# Patient Record
Sex: MALE | Race: BLACK OR AFRICAN AMERICAN | NOT HISPANIC OR LATINO | URBAN - METROPOLITAN AREA
[De-identification: names, ages, dates, MRNs, and addresses within clinical notes are randomized per-mention and may not be internally consistent; named-entity substitution may affect disease eponyms.]

---

## 2018-01-06 ENCOUNTER — EMERGENCY (EMERGENCY)
Facility: HOSPITAL | Age: 63
LOS: 0 days | Discharge: HOME | End: 2018-01-06
Attending: EMERGENCY MEDICINE | Admitting: EMERGENCY MEDICINE
Payer: MEDICARE

## 2018-01-06 VITALS
HEIGHT: 70 IN | DIASTOLIC BLOOD PRESSURE: 68 MMHG | TEMPERATURE: 98 F | HEART RATE: 70 BPM | RESPIRATION RATE: 16 BRPM | WEIGHT: 175.05 LBS | SYSTOLIC BLOOD PRESSURE: 115 MMHG

## 2018-01-06 DIAGNOSIS — D64.9 ANEMIA, UNSPECIFIED: ICD-10-CM

## 2018-01-06 DIAGNOSIS — F20.9 SCHIZOPHRENIA, UNSPECIFIED: ICD-10-CM

## 2018-01-06 DIAGNOSIS — M79.1 MYALGIA: ICD-10-CM

## 2018-01-06 DIAGNOSIS — Z59.0 HOMELESSNESS: ICD-10-CM

## 2018-01-06 DIAGNOSIS — Z79.899 OTHER LONG TERM (CURRENT) DRUG THERAPY: ICD-10-CM

## 2018-01-06 LAB
ALBUMIN SERPL ELPH-MCNC: 3.8 G/DL — SIGNIFICANT CHANGE UP (ref 3.3–5)
ALP SERPL-CCNC: 103 U/L — SIGNIFICANT CHANGE UP (ref 40–120)
ALT FLD-CCNC: 18 U/L — SIGNIFICANT CHANGE UP (ref 12–78)
ANION GAP SERPL CALC-SCNC: 6 MMOL/L — SIGNIFICANT CHANGE UP (ref 5–17)
ANISOCYTOSIS BLD QL: SIGNIFICANT CHANGE UP
AST SERPL-CCNC: 24 U/L — SIGNIFICANT CHANGE UP (ref 15–37)
BASOPHILS # BLD AUTO: 0.2 K/UL — SIGNIFICANT CHANGE UP (ref 0–0.2)
BASOPHILS NFR BLD AUTO: 1.9 % — SIGNIFICANT CHANGE UP (ref 0–2)
BILIRUB SERPL-MCNC: 0.3 MG/DL — SIGNIFICANT CHANGE UP (ref 0.2–1.2)
BUN SERPL-MCNC: 22 MG/DL — SIGNIFICANT CHANGE UP (ref 7–23)
CALCIUM SERPL-MCNC: 9.3 MG/DL — SIGNIFICANT CHANGE UP (ref 8.5–10.1)
CHLORIDE SERPL-SCNC: 107 MMOL/L — SIGNIFICANT CHANGE UP (ref 96–108)
CO2 SERPL-SCNC: 26 MMOL/L — SIGNIFICANT CHANGE UP (ref 22–31)
CREAT SERPL-MCNC: 0.71 MG/DL — SIGNIFICANT CHANGE UP (ref 0.5–1.3)
DACRYOCYTES BLD QL SMEAR: SLIGHT — SIGNIFICANT CHANGE UP
ELLIPTOCYTES BLD QL SMEAR: SLIGHT — SIGNIFICANT CHANGE UP
EOSINOPHIL # BLD AUTO: 0.2 K/UL — SIGNIFICANT CHANGE UP (ref 0–0.5)
EOSINOPHIL NFR BLD AUTO: 1.9 % — SIGNIFICANT CHANGE UP (ref 0–6)
GLUCOSE SERPL-MCNC: 73 MG/DL — SIGNIFICANT CHANGE UP (ref 70–99)
HCT VFR BLD CALC: 32 % — LOW (ref 39–50)
HGB BLD-MCNC: 9.4 G/DL — LOW (ref 13–17)
HYPOCHROMIA BLD QL: SLIGHT — SIGNIFICANT CHANGE UP
LYMPHOCYTES # BLD AUTO: 1.5 K/UL — SIGNIFICANT CHANGE UP (ref 1–3.3)
LYMPHOCYTES # BLD AUTO: 18.4 % — SIGNIFICANT CHANGE UP (ref 13–44)
MANUAL DIF COMMENT BLD-IMP: SIGNIFICANT CHANGE UP
MCHC RBC-ENTMCNC: 21 PG — LOW (ref 27–34)
MCHC RBC-ENTMCNC: 29.6 GM/DL — LOW (ref 32–36)
MCV RBC AUTO: 71.2 FL — LOW (ref 80–100)
MICROCYTES BLD QL: SIGNIFICANT CHANGE UP
MONOCYTES # BLD AUTO: 0.8 K/UL — SIGNIFICANT CHANGE UP (ref 0–0.9)
MONOCYTES NFR BLD AUTO: 10 % — SIGNIFICANT CHANGE UP (ref 2–14)
NEUTROPHILS # BLD AUTO: 5.6 K/UL — SIGNIFICANT CHANGE UP (ref 1.8–7.4)
NEUTROPHILS NFR BLD AUTO: 67.8 % — SIGNIFICANT CHANGE UP (ref 43–77)
PLAT MORPH BLD: NORMAL — SIGNIFICANT CHANGE UP
PLATELET # BLD AUTO: 268 K/UL — SIGNIFICANT CHANGE UP (ref 150–400)
POIKILOCYTOSIS BLD QL AUTO: SIGNIFICANT CHANGE UP
POTASSIUM SERPL-MCNC: 3.9 MMOL/L — SIGNIFICANT CHANGE UP (ref 3.5–5.3)
POTASSIUM SERPL-SCNC: 3.9 MMOL/L — SIGNIFICANT CHANGE UP (ref 3.5–5.3)
PROT SERPL-MCNC: 8 GM/DL — SIGNIFICANT CHANGE UP (ref 6–8.3)
RBC # BLD: 4.49 M/UL — SIGNIFICANT CHANGE UP (ref 4.2–5.8)
RBC # FLD: 22.2 % — HIGH (ref 10.3–14.5)
RBC BLD AUTO: (no result)
SCHISTOCYTES BLD QL AUTO: SLIGHT — SIGNIFICANT CHANGE UP
SODIUM SERPL-SCNC: 139 MMOL/L — SIGNIFICANT CHANGE UP (ref 135–145)
WBC # BLD: 8.2 K/UL — SIGNIFICANT CHANGE UP (ref 3.8–10.5)
WBC # FLD AUTO: 8.2 K/UL — SIGNIFICANT CHANGE UP (ref 3.8–10.5)

## 2018-01-06 PROCEDURE — 99284 EMERGENCY DEPT VISIT MOD MDM: CPT

## 2018-01-06 SDOH — ECONOMIC STABILITY - HOUSING INSECURITY: HOMELESSNESS: Z59.0

## 2018-01-06 NOTE — ED PROVIDER NOTE - NEUROLOGICAL, MLM
Alert and oriented, no focal deficits, no motor or sensory deficits. CNs 2-12 intact. No inattention. No dysarthria. No aphasia. No facial asymmetry. No limb ataxia.

## 2018-01-06 NOTE — ED ADULT NURSE NOTE - OBJECTIVE STATEMENT
pt arrives to ED brought in by EMS complaining of cold and knee pain. pt was found outside of a 711. pt states he is homeless. denies medical history.

## 2018-01-06 NOTE — ED PROVIDER NOTE - OBJECTIVE STATEMENT
62 year old male with PMH of schizophrenia, homeless, ?CAD, poor historian presents BIB EMS with multiple chief complaints including, diffuse myalgia, fatigue, being homeless and requiring a place to stay. As per EMS, patient likely homeless and wanted a warm place to be for the evening as colder temperature outside. No cp, sob or palpitation. Denies abdominal pain. No back pain. No syncope or near syncope. No lightheadedness. No GI bleeding (no hematochezia or melena). No recent trauma. No visual or focal neurological problems. Ambulatory. 62 year old male with PMH of schizophrenia, homeless, ?CAD, poor historian presents BIB EMS with multiple chief complaints including, diffuse myalgia, fatigue, toothache, arthalgia (knees), and being homeless and requiring a place to stay. As per EMS, patient likely homeless and wanted a warm place to be for the evening as colder temperature outside. No cp, sob or palpitation. Denies abdominal pain. No back pain. No syncope or near syncope. No lightheadedness. No GI bleeding (no hematochezia or melena). No recent trauma. No visual or focal neurological problems. Ambulatory.

## 2018-01-06 NOTE — ED PROVIDER NOTE - MUSCULOSKELETAL, MLM
Spine appears normal, range of motion is not limited, no muscle or joint tenderness. 5/5 strength in flexion and extension of all limbs.

## 2018-01-06 NOTE — ED PROVIDER NOTE - PSH
H/O knee surgery  bilateral  H/O left knee surgery  1980  H/O neck surgery  2007  H/O right knee surgery  1990  History of open heart surgery

## 2018-01-06 NOTE — ED PROVIDER NOTE - PMH
AAA (abdominal aortic aneurysm) without rupture    Abdominal aortic aneurysm without rupture  12/2015  Paranoid schizophrenia    Schizophrenia

## 2018-01-09 ENCOUNTER — EMERGENCY (EMERGENCY)
Facility: HOSPITAL | Age: 63
LOS: 0 days | Discharge: ANOTHER HOSPITAL | End: 2018-01-10
Attending: EMERGENCY MEDICINE | Admitting: EMERGENCY MEDICINE
Payer: MEDICARE

## 2018-01-09 VITALS — HEIGHT: 67 IN | WEIGHT: 149.91 LBS

## 2018-01-09 LAB
ALBUMIN SERPL ELPH-MCNC: 3.5 G/DL — SIGNIFICANT CHANGE UP (ref 3.3–5)
ALP SERPL-CCNC: 102 U/L — SIGNIFICANT CHANGE UP (ref 40–120)
ALT FLD-CCNC: 20 U/L — SIGNIFICANT CHANGE UP (ref 12–78)
ANION GAP SERPL CALC-SCNC: 8 MMOL/L — SIGNIFICANT CHANGE UP (ref 5–17)
ANISOCYTOSIS BLD QL: SIGNIFICANT CHANGE UP
AST SERPL-CCNC: 21 U/L — SIGNIFICANT CHANGE UP (ref 15–37)
BASOPHILS # BLD AUTO: 0.1 K/UL — SIGNIFICANT CHANGE UP (ref 0–0.2)
BASOPHILS NFR BLD AUTO: 2.1 % — HIGH (ref 0–2)
BILIRUB SERPL-MCNC: 0.3 MG/DL — SIGNIFICANT CHANGE UP (ref 0.2–1.2)
BUN SERPL-MCNC: 25 MG/DL — HIGH (ref 7–23)
CALCIUM SERPL-MCNC: 8.7 MG/DL — SIGNIFICANT CHANGE UP (ref 8.5–10.1)
CHLORIDE SERPL-SCNC: 110 MMOL/L — HIGH (ref 96–108)
CO2 SERPL-SCNC: 27 MMOL/L — SIGNIFICANT CHANGE UP (ref 22–31)
CREAT SERPL-MCNC: 0.93 MG/DL — SIGNIFICANT CHANGE UP (ref 0.5–1.3)
DACRYOCYTES BLD QL SMEAR: SLIGHT — SIGNIFICANT CHANGE UP
ELLIPTOCYTES BLD QL SMEAR: SLIGHT — SIGNIFICANT CHANGE UP
EOSINOPHIL # BLD AUTO: 0.2 K/UL — SIGNIFICANT CHANGE UP (ref 0–0.5)
EOSINOPHIL NFR BLD AUTO: 3.3 % — SIGNIFICANT CHANGE UP (ref 0–6)
GLUCOSE SERPL-MCNC: 103 MG/DL — HIGH (ref 70–99)
HCT VFR BLD CALC: 28.5 % — LOW (ref 39–50)
HGB BLD-MCNC: 8.7 G/DL — LOW (ref 13–17)
HYPOCHROMIA BLD QL: SIGNIFICANT CHANGE UP
LYMPHOCYTES # BLD AUTO: 1.6 K/UL — SIGNIFICANT CHANGE UP (ref 1–3.3)
LYMPHOCYTES # BLD AUTO: 26 % — SIGNIFICANT CHANGE UP (ref 13–44)
MANUAL DIF COMMENT BLD-IMP: SIGNIFICANT CHANGE UP
MCHC RBC-ENTMCNC: 21.8 PG — LOW (ref 27–34)
MCHC RBC-ENTMCNC: 30.6 GM/DL — LOW (ref 32–36)
MCV RBC AUTO: 71.1 FL — LOW (ref 80–100)
MICROCYTES BLD QL: SIGNIFICANT CHANGE UP
MONOCYTES # BLD AUTO: 0.7 K/UL — SIGNIFICANT CHANGE UP (ref 0–0.9)
MONOCYTES NFR BLD AUTO: 11.5 % — SIGNIFICANT CHANGE UP (ref 2–14)
NEUTROPHILS # BLD AUTO: 3.5 K/UL — SIGNIFICANT CHANGE UP (ref 1.8–7.4)
NEUTROPHILS NFR BLD AUTO: 57 % — SIGNIFICANT CHANGE UP (ref 43–77)
OVALOCYTES BLD QL SMEAR: SLIGHT — SIGNIFICANT CHANGE UP
PLAT MORPH BLD: NORMAL — SIGNIFICANT CHANGE UP
PLATELET # BLD AUTO: 286 K/UL — SIGNIFICANT CHANGE UP (ref 150–400)
POIKILOCYTOSIS BLD QL AUTO: SLIGHT — SIGNIFICANT CHANGE UP
POTASSIUM SERPL-MCNC: 4.3 MMOL/L — SIGNIFICANT CHANGE UP (ref 3.5–5.3)
POTASSIUM SERPL-SCNC: 4.3 MMOL/L — SIGNIFICANT CHANGE UP (ref 3.5–5.3)
PROT SERPL-MCNC: 7.6 GM/DL — SIGNIFICANT CHANGE UP (ref 6–8.3)
RBC # BLD: 4.01 M/UL — LOW (ref 4.2–5.8)
RBC # FLD: 22.2 % — HIGH (ref 10.3–14.5)
RBC BLD AUTO: (no result)
SCHISTOCYTES BLD QL AUTO: SLIGHT — SIGNIFICANT CHANGE UP
SODIUM SERPL-SCNC: 145 MMOL/L — SIGNIFICANT CHANGE UP (ref 135–145)
WBC # BLD: 6.1 K/UL — SIGNIFICANT CHANGE UP (ref 3.8–10.5)
WBC # FLD AUTO: 6.1 K/UL — SIGNIFICANT CHANGE UP (ref 3.8–10.5)

## 2018-01-09 PROCEDURE — 71275 CT ANGIOGRAPHY CHEST: CPT | Mod: 26

## 2018-01-09 PROCEDURE — 99285 EMERGENCY DEPT VISIT HI MDM: CPT

## 2018-01-09 PROCEDURE — 74174 CTA ABD&PLVS W/CONTRAST: CPT | Mod: 26

## 2018-01-09 RX ORDER — ACETAMINOPHEN 500 MG
1000 TABLET ORAL ONCE
Qty: 0 | Refills: 0 | Status: COMPLETED | OUTPATIENT
Start: 2018-01-09 | End: 2018-01-09

## 2018-01-09 RX ADMIN — Medication 1000 MILLIGRAM(S): at 22:40

## 2018-01-09 NOTE — ED STATDOCS - MEDICAL DECISION MAKING DETAILS
Plan labs, CT A/P to assess AAA, SW consult. Pt with abdominal pain, plan labs, CT A/P to assess AAA, SW consult.  Low suspicion for AAA rupture.

## 2018-01-09 NOTE — ED STATDOCS - OBJECTIVE STATEMENT
61 yo male presents with headache and abdominal pain, intermittent for at least one year.  States he is homeless and has no place to stay tonight, wishes to speak with SW.

## 2018-01-09 NOTE — ED ADULT NURSE NOTE - OBJECTIVE STATEMENT
pt c/o abdominal pain and headache x1yr. states that he is homeless. denies chest pain. pt pacing and repeating questions. pt oriented x3 but confused at times.

## 2018-01-09 NOTE — ED STATDOCS - PROGRESS NOTE DETAILS
63 yo male with a PMH of pt with enlarging infrarenal aaa, spoke with transfer center sera Burnette pt to be transferred to [facility], ER to ER for possible vascular intervention. gave report to Dr. Rivero from er at [facility]

## 2018-01-10 ENCOUNTER — INPATIENT (INPATIENT)
Facility: HOSPITAL | Age: 63
LOS: 6 days | Discharge: HOME | End: 2018-01-17
Attending: SURGERY | Admitting: SURGERY
Payer: MEDICARE

## 2018-01-10 VITALS
TEMPERATURE: 98 F | HEART RATE: 86 BPM | SYSTOLIC BLOOD PRESSURE: 123 MMHG | OXYGEN SATURATION: 100 % | RESPIRATION RATE: 16 BRPM | DIASTOLIC BLOOD PRESSURE: 71 MMHG

## 2018-01-10 VITALS
DIASTOLIC BLOOD PRESSURE: 64 MMHG | OXYGEN SATURATION: 98 % | HEART RATE: 73 BPM | TEMPERATURE: 98 F | RESPIRATION RATE: 16 BRPM | SYSTOLIC BLOOD PRESSURE: 116 MMHG

## 2018-01-10 DIAGNOSIS — I71.4 ABDOMINAL AORTIC ANEURYSM, WITHOUT RUPTURE: ICD-10-CM

## 2018-01-10 DIAGNOSIS — F20.0 PARANOID SCHIZOPHRENIA: ICD-10-CM

## 2018-01-10 DIAGNOSIS — Z59.0 HOMELESSNESS: ICD-10-CM

## 2018-01-10 DIAGNOSIS — Z98.890 OTHER SPECIFIED POSTPROCEDURAL STATES: Chronic | ICD-10-CM

## 2018-01-10 DIAGNOSIS — R07.89 OTHER CHEST PAIN: ICD-10-CM

## 2018-01-10 LAB
ABO RH CONFIRMATION: SIGNIFICANT CHANGE UP
APTT BLD: 26.2 SEC — LOW (ref 27.5–37.4)
APTT BLD: 26.9 SEC — LOW (ref 27.5–37.4)
BLD GP AB SCN SERPL QL: NEGATIVE — SIGNIFICANT CHANGE UP
BLD GP AB SCN SERPL QL: SIGNIFICANT CHANGE UP
BUN SERPL-MCNC: 19 MG/DL — SIGNIFICANT CHANGE UP (ref 7–23)
CA-I BLD-SCNC: 1.16 MMOL/L — SIGNIFICANT CHANGE UP (ref 1.03–1.23)
CALCIUM SERPL-MCNC: 8.7 MG/DL — SIGNIFICANT CHANGE UP (ref 8.4–10.5)
CHLORIDE SERPL-SCNC: 105 MMOL/L — SIGNIFICANT CHANGE UP (ref 98–107)
CO2 SERPL-SCNC: 25 MMOL/L — SIGNIFICANT CHANGE UP (ref 22–31)
CREAT SERPL-MCNC: 0.76 MG/DL — SIGNIFICANT CHANGE UP (ref 0.5–1.3)
GLUCOSE SERPL-MCNC: 94 MG/DL — SIGNIFICANT CHANGE UP (ref 70–99)
HCT VFR BLD CALC: 29 % — LOW (ref 39–50)
HGB BLD-MCNC: 8.2 G/DL — LOW (ref 13–17)
INR BLD: 0.94 — SIGNIFICANT CHANGE UP (ref 0.88–1.17)
INR BLD: 0.96 RATIO — SIGNIFICANT CHANGE UP (ref 0.88–1.16)
MAGNESIUM SERPL-MCNC: 1.9 MG/DL — SIGNIFICANT CHANGE UP (ref 1.6–2.6)
MCHC RBC-ENTMCNC: 21 PG — LOW (ref 27–34)
MCHC RBC-ENTMCNC: 28.3 % — LOW (ref 32–36)
MCV RBC AUTO: 74.4 FL — LOW (ref 80–100)
NRBC # FLD: 0 — SIGNIFICANT CHANGE UP
PHOSPHATE SERPL-MCNC: 3.1 MG/DL — SIGNIFICANT CHANGE UP (ref 2.5–4.5)
PLATELET # BLD AUTO: 284 K/UL — SIGNIFICANT CHANGE UP (ref 150–400)
PMV BLD: 10.3 FL — SIGNIFICANT CHANGE UP (ref 7–13)
POTASSIUM SERPL-MCNC: 3.9 MMOL/L — SIGNIFICANT CHANGE UP (ref 3.5–5.3)
POTASSIUM SERPL-SCNC: 3.9 MMOL/L — SIGNIFICANT CHANGE UP (ref 3.5–5.3)
PROTHROM AB SERPL-ACNC: 10.4 SEC — SIGNIFICANT CHANGE UP (ref 9.8–12.7)
PROTHROM AB SERPL-ACNC: 10.8 SEC — SIGNIFICANT CHANGE UP (ref 9.8–13.1)
RBC # BLD: 3.9 M/UL — LOW (ref 4.2–5.8)
RBC # FLD: 24.4 % — HIGH (ref 10.3–14.5)
RH IG SCN BLD-IMP: NEGATIVE — SIGNIFICANT CHANGE UP
RH IG SCN BLD-IMP: NEGATIVE — SIGNIFICANT CHANGE UP
SODIUM SERPL-SCNC: 141 MMOL/L — SIGNIFICANT CHANGE UP (ref 135–145)
TYPE + AB SCN PNL BLD: SIGNIFICANT CHANGE UP
WBC # BLD: 5.23 K/UL — SIGNIFICANT CHANGE UP (ref 3.8–10.5)
WBC # FLD AUTO: 5.23 K/UL — SIGNIFICANT CHANGE UP (ref 3.8–10.5)

## 2018-01-10 PROCEDURE — 99222 1ST HOSP IP/OBS MODERATE 55: CPT

## 2018-01-10 PROCEDURE — 93010 ELECTROCARDIOGRAM REPORT: CPT

## 2018-01-10 PROCEDURE — 74019 RADEX ABDOMEN 2 VIEWS: CPT | Mod: 26

## 2018-01-10 PROCEDURE — 93925 LOWER EXTREMITY STUDY: CPT | Mod: 26

## 2018-01-10 RX ORDER — OLANZAPINE 15 MG/1
5 TABLET, FILM COATED ORAL EVERY 6 HOURS
Qty: 0 | Refills: 0 | Status: DISCONTINUED | OUTPATIENT
Start: 2018-01-10 | End: 2018-01-17

## 2018-01-10 RX ORDER — SODIUM CHLORIDE 9 MG/ML
1000 INJECTION, SOLUTION INTRAVENOUS
Qty: 0 | Refills: 0 | Status: DISCONTINUED | OUTPATIENT
Start: 2018-01-10 | End: 2018-01-11

## 2018-01-10 RX ORDER — MORPHINE SULFATE 50 MG/1
4 CAPSULE, EXTENDED RELEASE ORAL ONCE
Qty: 0 | Refills: 0 | Status: DISCONTINUED | OUTPATIENT
Start: 2018-01-10 | End: 2018-01-10

## 2018-01-10 RX ORDER — DOCUSATE SODIUM 100 MG
100 CAPSULE ORAL THREE TIMES A DAY
Qty: 0 | Refills: 0 | Status: DISCONTINUED | OUTPATIENT
Start: 2018-01-10 | End: 2018-01-17

## 2018-01-10 RX ORDER — ONDANSETRON 8 MG/1
4 TABLET, FILM COATED ORAL EVERY 8 HOURS
Qty: 0 | Refills: 0 | Status: DISCONTINUED | OUTPATIENT
Start: 2018-01-10 | End: 2018-01-17

## 2018-01-10 RX ORDER — HEPARIN SODIUM 5000 [USP'U]/ML
5000 INJECTION INTRAVENOUS; SUBCUTANEOUS EVERY 8 HOURS
Qty: 0 | Refills: 0 | Status: DISCONTINUED | OUTPATIENT
Start: 2018-01-10 | End: 2018-01-17

## 2018-01-10 RX ORDER — INFLUENZA VIRUS VACCINE 15; 15; 15; 15 UG/.5ML; UG/.5ML; UG/.5ML; UG/.5ML
0.5 SUSPENSION INTRAMUSCULAR ONCE
Qty: 0 | Refills: 0 | Status: DISCONTINUED | OUTPATIENT
Start: 2018-01-10 | End: 2018-01-17

## 2018-01-10 RX ORDER — METOPROLOL TARTRATE 50 MG
12.5 TABLET ORAL
Qty: 0 | Refills: 0 | Status: DISCONTINUED | OUTPATIENT
Start: 2018-01-10 | End: 2018-01-17

## 2018-01-10 RX ADMIN — HEPARIN SODIUM 5000 UNIT(S): 5000 INJECTION INTRAVENOUS; SUBCUTANEOUS at 21:14

## 2018-01-10 RX ADMIN — SODIUM CHLORIDE 125 MILLILITER(S): 9 INJECTION, SOLUTION INTRAVENOUS at 17:47

## 2018-01-10 RX ADMIN — Medication 100 MILLIGRAM(S): at 21:14

## 2018-01-10 RX ADMIN — MORPHINE SULFATE 4 MILLIGRAM(S): 50 CAPSULE, EXTENDED RELEASE ORAL at 13:34

## 2018-01-10 RX ADMIN — SODIUM CHLORIDE 125 MILLILITER(S): 9 INJECTION, SOLUTION INTRAVENOUS at 21:15

## 2018-01-10 RX ADMIN — MORPHINE SULFATE 4 MILLIGRAM(S): 50 CAPSULE, EXTENDED RELEASE ORAL at 09:51

## 2018-01-10 SDOH — ECONOMIC STABILITY - HOUSING INSECURITY: HOMELESSNESS: Z59.0

## 2018-01-10 NOTE — H&P ADULT - NSHPPHYSICALEXAM_GEN_ALL_CORE
Vital Signs Last 24 Hrs  T(C): 36.9 (10 Kang 2018 07:57), Max: 36.9 (10 Kang 2018 07:57)  T(F): 98.4 (10 Kang 2018 07:57), Max: 98.4 (10 Kang 2018 07:57)  HR: 74 (10 Kang 2018 07:57) (73 - 74)  BP: 118/67 (10 Kang 2018 07:57) (116/64 - 118/67)  BP(mean): --  RR: 16 (10 Kang 2018 07:57) (16 - 16)  SpO2: 100% (10 Kang 2018 07:57) (98% - 100%)    GEN: NAD, alert and oriented x 3  HEENT: WNL  CHEST: Well-healed midline sternotomy scar. Symmetrical chest rise, breath sounds CTAB  HEART: RRR, non-muffled heart sounds  ABD: Soft, non-distended. Patient reports pain in all 4 quadrants to palpation, but with distraction no tenderness could be elicited.  PSYCH: Oriented and alert. Flat affect and odd word choices (refers to many physical objects with 'Mr.' pronoun ('Mr. Aspirin, Mr. Budweiser, etc.). Denies any feelings of persecution at present, denies hearing/seeing anything that others cannot.  EXT/VASC:                          RUE - No edema/erythema or deformity. Warm, cap refill < 2 sec. Motor and sensory function intact.                                        radial  -  palpable [ x ]       doppler signal [  ]                                       ulnar  -  palpable [  ]       doppler signal [  ]                                       brachial  -  palpable [  ]       doppler signal [  ]                       LUE  - No edema/erythema or deformity. Warm, cap refill < 2 sec. Motor and sensory function intact.                                        radial  -  palpable [  ]       doppler signal [  ]                                       ulnar  -  palpable [  ]       doppler signal [  ]                                       brachial  -  palpable [  ]       doppler signal [  ]                       RLE - No edema/erythema or deformity. Warm, cap refill < 2 sec. Motor and sensory function intact.                                        femoral  -  palpable [ x ]       doppler signal [  ]                                       popliteal  -  palpable [  ]       doppler signal [ x ]                                       DP -  palpable [  ]       doppler signal [ x ]                                       PT -  palpable [  ]       doppler signal [ x ]                       LLE - No edema/erythema or deformity. Warm, cap refill < 2 sec. Motor and sensory function intact.                                        femoral  -  palpable [ x ]       doppler signal [  ]                                       popliteal  -  palpable [  ]       doppler signal [ x ]                                       AT -  palpable [  ]       doppler signal [ x ], No DP signal                                       PT -  palpable [  ]       doppler signal [ x ]

## 2018-01-10 NOTE — H&P ADULT - HISTORY OF PRESENT ILLNESS
62y male - known history of infra-renal AAA and diagnosis of schizophrenia - presents as transfer from [facility] for abdominal pain and enlarged AAA. Patient is reporting crampy abdominal pain, 9/10 in severity, constant in periodicity, with no exacerbating/alleviating factors. Denies nausea/vomiting, reports passage of flatus but cannot recall his last BM. He is also reporting pain in both feet with ambulation (the quality of this pain he cannot adequately characterize), which is alleviated with rest. No pain with elevation of lower extremities. Denies any weakness or paresthesias. Denies lightheadedness.   Patient does report intermittent chest pain with physical activity, which is characterized as tightness. No chest pain at present.  Last meal was 10 hours prior to evaluation.    Patient was previously admitted to [facility] in winter of 2015 with abdominal pain. He was found to have a 5.2 cm infrarenal AAA with a concurrent 2.3 cm left ANDERSON aneurysm. A TTE was performed, revealing an EF of 5--55% with no significant ventricular dysfunction or valvular abnormalities. He was also diagnosed with schizophrenia, but found (as per the discharge summary) to have capacity to make medical decisions after Psychiatric evaluation. He refused surgery at the time, and was discharged on haldol, quetiapine, simvastatin and famotidine. He did not report for any follow-up care, and has not taken any medications (aside from periodic ASA 81) in the intervening period. He has a daughter who has been involved in his healthcare in the past (2015), but he cannot recall any of her contact information. He is amenable to surgical treatment at this time.

## 2018-01-10 NOTE — CONSULT NOTE ADULT - ASSESSMENT
62M with infra-renal AAA, schizophrenia, ?CABG presents as transfer from [facility] for abdominal pain and enlarged AAA.     INCOMPLETE NOTE    Feng Duff MD 62M with infra-renal AAA, schizophrenia, ?CABG presents as transfer from [facility] for abdominal pain and enlarged AAA.     -- check TTE  -- start metoprolol 12.5 mg BID  -- will provide further risk assessment with TTE    Feng Duff MD

## 2018-01-10 NOTE — ED PROVIDER NOTE - OBJECTIVE STATEMENT
61 y/o male with hx OHS, Paranoid Schizophrenia, homelessness and AAA, is now sent to [facility] from [facility] for gradually worsening abdominal pain.  Pt also complains of bilateral foot pain.  Pt states that he has healing ulcers on his feet.  Pt states that he was told he had trench foot at one time. He also complains that his ears are clogged and wants them cleared. Pt denies fever, chills, nausea, vomiting diarrhea dysuria, or urinary frequency and urgency.   Pt states that he does get exertional dyspnea and chest tightness on exertion.

## 2018-01-10 NOTE — ED PROVIDER NOTE - ENMT NEGATIVE STATEMENT, MLM
Ears are clogged but no dysphagia, no hoarseness and no throat pain.Neck: no lumps, no pain, no stiffness and no swollen glands.

## 2018-01-10 NOTE — BEHAVIORAL HEALTH ASSESSMENT NOTE - CASE SUMMARY
61 yo Homeless male with schizophrenia   1- pt can consent to surgery  2- if agitated can offer Zyprexa 5mg PO/IM prn Q8 hours

## 2018-01-10 NOTE — BEHAVIORAL HEALTH ASSESSMENT NOTE - SUMMARY
Pt is a 61yo male, single, disabled, undomiciled, with reported history of paranoid schizophrenia (not on meds), distant past psychiatric hospitalizations, no past suicide attempts, current smoker, PMH infra-renal AAA presents as transfer from [facility] for abdominal pain and enlarged AAA.     On exam, pt AAOx4, irritable.  Pt verbalizes understanding of current condition, treatment plan, and risks/benefits.  He appreciates that he requires treatment.  Process information in a linear manner.  Pt has capacity to consent for surgery.  He verbalizes agreement at this time.  Pt denies acute psychiatric symptoms and denies need for psychiatric medications.  PRNs as below.  Monitor EKG for QTC.  Will follow.

## 2018-01-10 NOTE — ED ADULT NURSE REASSESSMENT NOTE - NS ED NURSE REASSESS COMMENT FT1
pt to be transferred to [facility] ED r/t AAA (grown since 2015 as per CT). report given to Luzma, transfer center RN at [phone].

## 2018-01-10 NOTE — BEHAVIORAL HEALTH ASSESSMENT NOTE - NSBHCONSULTFOLLOWAFTERCARE_PSY_A_CORE FT
[facility] [[email or web address]] Clinic [phone], Monday to Friday, 9am-7pm, walk-in

## 2018-01-10 NOTE — BEHAVIORAL HEALTH ASSESSMENT NOTE - HPI (INCLUDE ILLNESS QUALITY, SEVERITY, DURATION, TIMING, CONTEXT, MODIFYING FACTORS, ASSOCIATED SIGNS AND SYMPTOMS)
Pt is a 61yo male, single, disabled, undomiciled, with reported history of paranoid schizophrenia (not on meds), distant past psychiatric hospitalizations, no past suicide attempts, current smoker, [facility] infra-renal AAA presents as transfer from [facility] for abdominal pain and enlarged AAA.     Seen and examined at bedside.  Pt AAOx4, irritable.  Pt verbalizes understanding of having AAA for years.  States he has been experiencing "jumping" in his abdomen recently.  Verbalizes understanding that he needs to have surgery for repair and verbalizes agreement to have surgery.  Pt is somewhat uncooperative with interview, but generally verbalizes understanding of risks and benefits of surgery.      Pt reports frustration with not having food.  Denies acute psychiatric symptoms.  Denies SI/HI.  Denies AH/VH or psychotic symptoms.  Reports feeling safe in the hospital.  Endorses poor sleep for years.  States he did not sleep last night.  Denies periods of elevated mood.  Pt largely linear during interview.  At end of interview, pt repeats blessings to writer over and over again.  Pt reports history of paranoid schizophrenia diagnosed at age 25.  Reports numerous past psychiatric hospitalizations, last a few years ago.  States he has not been taking psychotropics for a few years as well because he does not need it.  States he has been "fine" without medication.  Denies paranoid ideation or other psychotic symptoms while off meds.  Reports taking Haldol and Prolixin in the past.  Denies past suicide attempts.  Reports smoking 0.5ppd.

## 2018-01-10 NOTE — ED ADULT TRIAGE NOTE - CHIEF COMPLAINT QUOTE
Transferred to [facility] from [facility] for vascular eval.  Patient is homeless, appears comfortable and in no apparent distress.  History of AAA in 2015.  Presented to [facility] for abdominal pain and headache.  Per transfer center, CT showed enlarged AAA without rupture. Denies chest pain, dizziness or SOB.  Patient is a poor historian and unable to provide further information.

## 2018-01-10 NOTE — BEHAVIORAL HEALTH ASSESSMENT NOTE - NSBHCHARTREVIEWLAB_PSY_A_CORE FT
8.2    5.23  )-----------( 284      ( 10 Kang 2018 18:10 )             29.0   01-10    141  |  105  |  19  ----------------------------<  94  3.9   |  25  |  0.76    Ca    8.7      10 Kang 2018 18:10  Phos  3.1     01-10  Mg     1.9     01-10

## 2018-01-10 NOTE — H&P ADULT - NSHPSOCIALHISTORY_GEN_ALL_CORE
Patient is homeless - prior to admission, he states that he was living out of an automobile. He is a current smoker, 45 pack-years. Denies any significant alcohol consumption - now or in the past. Denies illicit drug usage.

## 2018-01-10 NOTE — H&P ADULT - PROBLEM SELECTOR PLAN 3
- EKG ordered  - TTE ordered  - Cardiology consultation requested, would appreciate recommendations for further evaluation prior to surgery and ongoing medical therapy.

## 2018-01-10 NOTE — ED ADULT NURSE NOTE - OBJECTIVE STATEMENT
pt aox3; transfer from [facility] for vascular eval. CT showed AAA with no rupture. Pt presents in no acute distress, VSS. Denies pain at this time.  .  No labored breathing noted. MD bhakta in progress at this time, awaiting further orders.  VSS. IV in placed in left ac from [place].  Placed on cardiac monitor. Will endorse report to receiving RN

## 2018-01-10 NOTE — CONSULT NOTE ADULT - SUBJECTIVE AND OBJECTIVE BOX
Patient seen and evaluated @ 10:00 AM  Chief Complaint: Abd pain    HPI:  62M with infra-renal AAA, schizophrenia, ?CABG presents as transfer from [facility] for abdominal pain and enlarged AAA. Patient is reporting crampy abdominal pain, 9/10 in severity, constant in periodicity, with no exacerbating/alleviating factors. Denies nausea/vomiting, reports passage of flatus but cannot recall his last BM. He is also reporting pain in both feet with ambulation (the quality of this pain he cannot adequately characterize), which is alleviated with rest. No pain with elevation of lower extremities. Denies any weakness or paresthesias. Denies lightheadedness.   Patient does report intermittent chest pain with physical activity, which is characterized as tightness. No chest pain at present.  Last meal was 10 hours prior to evaluation.    Patient was previously admitted to [facility] in winter of 2015 with abdominal pain. He was found to have a 5.2 cm infrarenal AAA with a concurrent 2.3 cm left ANDERSON aneurysm. A TTE was performed, revealing an EF of 50-55% with no significant ventricular dysfunction or valvular abnormalities. He was also diagnosed with schizophrenia, but found (as per the discharge summary) to have capacity to make medical decisions after Psychiatric evaluation. He refused surgery at the time, and was discharged on haldol, quetiapine, simvastatin and famotidine. He did not report for any follow-up care, and has not taken any medications (aside from periodic ASA 81) in the intervening period. He has a daughter who has been involved in his healthcare in the past (2015), but he cannot recall any of her contact information. He is amenable to surgical treatment at this time.    Upon my evaluation patient reports no prior hx of HTN, HLD, DM, MI. Reports issues with broken vertabrae and knee problems. Reports CP with exertion, SOB all his life. Has been worsening for the past year.    TTE at [place] EF 50-55%, mild MR.    PMH:   Paranoid schizophrenia  AAA (abdominal aortic aneurysm) without rupture    PSH:   H/O knee surgery  History of open heart surgery    Medications:   heparin  Injectable 5000 Unit(s) SubCutaneous every 8 hours  lactated ringers. 1000 milliLiter(s) IV Continuous <Continuous>    Allergies:  Haldol (Unknown)  penicillin (Anaphylaxis)    FAMILY HISTORY:  NC    Social History:  45 pack year smoking history  Homeless  No EtOH    Review of Systems:  Constitutional: [ ] Fever [ ] Chills [ ] Fatigue [ ] Weight change   HEENT: [ ] Blurred vision [ ] Eye Pain [ ] Headache [ ] Runny nose [ ] Sore Throat   Respiratory: [ ] Cough [ ] Wheezing [ ] Shortness of breath  Cardiovascular: [ ] Chest Pain [ ] Palpitations [ ] REN [ ] PND [ ] Orthopnea  Gastrointestinal: [ ] Abdominal Pain [ ] Diarrhea [ ] Constipation [ ] Hemorrhoids [ ] Nausea [ ] Vomiting  Genitourinary: [ ] Nocturia [ ] Dysuria [ ] Incontinence  Extremities: [ ] Swelling [ ] Joint Pain  Neurologic: [ ] Focal deficit [ ] Paresthesias [ ] Syncope  Lymphatic: [ ] Swelling [ ] Lymphadenopathy   Skin: [ ] Rash [ ] Ecchymoses [ ] Wounds [ ] Lesions  Psychiatry: [ ] Depression [ ] Suicidal/Homicidal Ideation [ ] Anxiety [ ] Sleep Disturbances  [ ] 10 point review of systems is otherwise negative except as mentioned above            [ ]Unable to obtain    Physical Exam:  T(C): 36.9 (01-10-18 @ 07:57), Max: 36.9 (01-10-18 @ 07:57)  HR: 74 (01-10-18 @ 07:57) (73 - 74)  BP: 118/67 (01-10-18 @ 07:57) (116/64 - 118/67)  RR: 16 (01-10-18 @ 07:57) (16 - 16)  SpO2: 100% (01-10-18 @ 07:57) (98% - 100%)  Wt(kg): --    Daily     Daily     Appearance: NAD  Eyes: PERRL, EOMI  HENT: Normal oral mus[place]a, NC/AT  Cardiovascular: normal S1 and S2, RRR, no m/r/g, no edema, normal JVP  Respiratory: Clear to auscultation bilaterally  Gastrointestinal: Soft, non-tender, non-distended, BS+  Musculoskeletal: No clubbing, no joint deformity   Neurologic: Non-focal  Lymphatic: No lymphadenopathy  Psychiatry: AAOx3, not cooperating fully with questions  Skin: No rashes, no ecchymoses, no cyanosis    Cardiovascular Diagnostic Testing:  ECG: pending    Echo:  2015 TTE with EF 50-55%, mild MR    Stress Testing:  none    Cath: none    Interpretation of Telemetry: none    Imaging:  AXR with noted sternotomy wire    CTA  Infrarenal AAA measuring 6.1 (increased from 5.2 in 2015) cm in greatest transverse dimension. Bilateral common iliac artery aneurysms: Left 3.8 cm (increased from 2.3 in 2015), Right 2.9 cm. Left internal iliac artery aneurysm, 2.1 cm.  	 - Neck ~ 4 cm  	 - Both internal iliacs branch from diseased portions of CIAs   - No rupture or signs of impending rupture    Labs:

## 2018-01-10 NOTE — H&P ADULT - PROBLEM SELECTOR PLAN 1
- Admit to Vascular Surgery  - b/l LE arterial duplex studies (screening for popliteal aneurysmal disease)  - Will plan for endovascular repair, after evaluation of pre-operative cardiac risk and psychological capacity

## 2018-01-10 NOTE — H&P ADULT - PROBLEM SELECTOR PLAN 2
- Psychiatry consult called  - Would appreciate recommendations for treatment options and evaluation of capacity to make medical decisions.

## 2018-01-10 NOTE — BEHAVIORAL HEALTH ASSESSMENT NOTE - RISK ASSESSMENT
Risk factors include history of schizophrenia, past psych hospitalizations, acute medical issue, male, undomiciled, disabled, limited social support.  Protective factors include no past SA, no substance use.  Pt denies SI/HI/AH/VH, manic or psychotic symptoms.  Pt does not present an acute risk of harm to self or others.  Pt does not require inpatient psychiatric hospitalization.

## 2018-01-10 NOTE — H&P ADULT - NSHPLABSRESULTS_GEN_ALL_CORE
(Labs ordered, results pending)      IMAGING:    A CT angiogram of the abdomen and pelvis was performed at [facility] [place]. The study was reviewed with Dr. Aldridge (Radiology) at [facility]:    Infrarenal AAA measuring 6.1 (increased from 5.2 in 2015) cm in greatest transverse dimension. Bilateral common iliac artery aneurysms: Left 3.8 cm (increased from 2.3 in 2015), Right 2.9 cm. Left internal iliac artery aneurysm, 2.1 cm.   - Neck ~ 4 cm   - Both internal iliacs branch from diseased portions of CIAs   - No rupture or signs of impending rupture

## 2018-01-11 DIAGNOSIS — I71.4 ABDOMINAL AORTIC ANEURYSM, WITHOUT RUPTURE: ICD-10-CM

## 2018-01-11 DIAGNOSIS — R10.9 UNSPECIFIED ABDOMINAL PAIN: ICD-10-CM

## 2018-01-11 DIAGNOSIS — F20.9 SCHIZOPHRENIA, UNSPECIFIED: ICD-10-CM

## 2018-01-11 DIAGNOSIS — Z79.899 OTHER LONG TERM (CURRENT) DRUG THERAPY: ICD-10-CM

## 2018-01-11 DIAGNOSIS — Z59.0 HOMELESSNESS: ICD-10-CM

## 2018-01-11 PROCEDURE — 99232 SBSQ HOSP IP/OBS MODERATE 35: CPT | Mod: 57

## 2018-01-11 PROCEDURE — 93306 TTE W/DOPPLER COMPLETE: CPT | Mod: 26

## 2018-01-11 PROCEDURE — 90792 PSYCH DIAG EVAL W/MED SRVCS: CPT

## 2018-01-11 PROCEDURE — 71045 X-RAY EXAM CHEST 1 VIEW: CPT | Mod: 26

## 2018-01-11 RX ORDER — SODIUM CHLORIDE 9 MG/ML
1000 INJECTION, SOLUTION INTRAVENOUS
Qty: 0 | Refills: 0 | Status: DISCONTINUED | OUTPATIENT
Start: 2018-01-11 | End: 2018-01-13

## 2018-01-11 RX ORDER — ASPIRIN/CALCIUM CARB/MAGNESIUM 324 MG
81 TABLET ORAL DAILY
Qty: 0 | Refills: 0 | Status: DISCONTINUED | OUTPATIENT
Start: 2018-01-11 | End: 2018-01-17

## 2018-01-11 RX ORDER — ATORVASTATIN CALCIUM 80 MG/1
20 TABLET, FILM COATED ORAL AT BEDTIME
Qty: 0 | Refills: 0 | Status: DISCONTINUED | OUTPATIENT
Start: 2018-01-11 | End: 2018-01-17

## 2018-01-11 RX ORDER — SODIUM CHLORIDE 9 MG/ML
1000 INJECTION, SOLUTION INTRAVENOUS
Qty: 0 | Refills: 0 | Status: DISCONTINUED | OUTPATIENT
Start: 2018-01-11 | End: 2018-01-11

## 2018-01-11 RX ADMIN — Medication 81 MILLIGRAM(S): at 12:21

## 2018-01-11 RX ADMIN — Medication 100 MILLIGRAM(S): at 14:45

## 2018-01-11 RX ADMIN — HEPARIN SODIUM 5000 UNIT(S): 5000 INJECTION INTRAVENOUS; SUBCUTANEOUS at 05:44

## 2018-01-11 RX ADMIN — HEPARIN SODIUM 5000 UNIT(S): 5000 INJECTION INTRAVENOUS; SUBCUTANEOUS at 14:45

## 2018-01-11 RX ADMIN — HEPARIN SODIUM 5000 UNIT(S): 5000 INJECTION INTRAVENOUS; SUBCUTANEOUS at 22:14

## 2018-01-11 RX ADMIN — Medication 12.5 MILLIGRAM(S): at 05:45

## 2018-01-11 RX ADMIN — Medication 100 MILLIGRAM(S): at 05:45

## 2018-01-11 RX ADMIN — ATORVASTATIN CALCIUM 20 MILLIGRAM(S): 80 TABLET, FILM COATED ORAL at 22:14

## 2018-01-11 SDOH — ECONOMIC STABILITY - HOUSING INSECURITY: HOMELESSNESS: Z59.0

## 2018-01-11 NOTE — PROGRESS NOTE ADULT - ASSESSMENT
62y male with 6.1 cm infrarenal AAA and abdominal pain    - f/u TTE  - Cardiology consultation requested, would appreciate recommendations for further evaluation prior to surgery and ongoing medical therapy  - f/u psych  - pain control  - operative planning

## 2018-01-11 NOTE — PROGRESS NOTE ADULT - SUBJECTIVE AND OBJECTIVE BOX
GENERAL SURGERY DAILY PROGRESS NOTE:     Subjective:  NAOE.         Objective:    PE:  Gen: NAD  Resp: airway patent, respirations unlabored, no increased WoB  CVS: RRR  Abd: soft, ND, NT, no rebound or guarding  Ext: no edema, WWP  Neuro: AAOx3, no focal deficits    Vital Signs Last 24 Hrs  T(C): 36.6 (2018 01:21), Max: 36.9 (10 Kang 2018 07:57)  T(F): 97.9 (2018 01:21), Max: 98.4 (10 Kang 2018 07:57)  HR: 60 (2018 01:21) (60 - 74)  BP: 112/68 (2018 01:21) (109/54 - 118/67)  BP(mean): --  RR: 18 (2018 01:21) (16 - 18)  SpO2: 100% (2018 01:21) (98% - 100%)    I&O's Detail    10 Kang 2018 07:01  -  2018 01:58  --------------------------------------------------------  IN:    lactated ringers.: 500 mL  Total IN: 500 mL    OUT:    Voided: 200 mL  Total OUT: 200 mL    Total NET: 300 mL          Daily     Daily Weight in k.1 (2018 01:21)    MEDICATIONS  (STANDING):  docusate sodium 100 milliGRAM(s) Oral three times a day  heparin  Injectable 5000 Unit(s) SubCutaneous every 8 hours  influenza   Vaccine 0.5 milliLiter(s) IntraMuscular once  lactated ringers. 1000 milliLiter(s) (125 mL/Hr) IV Continuous <Continuous>  metoprolol     tartrate 12.5 milliGRAM(s) Oral two times a day    MEDICATIONS  (PRN):  OLANZapine 5 milliGRAM(s) Oral every 6 hours PRN Agitation  OLANZapine Injectable 5 milliGRAM(s) IntraMuscular every 6 hours PRN Agitation  ondansetron Injectable 4 milliGRAM(s) IV Push every 8 hours PRN Nausea      LABS:                        8.2    5.23  )-----------( 284      ( 10 Kang 2018 18:10 )             29.0     01-10    141  |  105  |  19  ----------------------------<  94  3.9   |  25  |  0.76    Ca    8.7      10 Kang 2018 18:10  Phos  3.1     -10  Mg     1.9     01-10      PT/INR - ( 10 Kang 2018 18:10 )   PT: 10.8 SEC;   INR: 0.94          PTT - ( 10 Kang 2018 18:10 )  PTT:26.9 SEC      RADIOLOGY & ADDITIONAL STUDIES:

## 2018-01-11 NOTE — PROGRESS NOTE ADULT - ASSESSMENT
62M with infra-renal AAA, schizophrenia, ?CABG presents as transfer from [facility] for abdominal pain and enlarged AAA. For preoperative cardiovascular risk assessment    -- check TTE  -- check CXR  -- continue metoprolol  -- will provide further risk assessment with TTE results, limited ability given patient's poor history and lack of records    Feng Duff MD 62M with infra-renal AAA, schizophrenia, ?CABG presents as transfer from [facility] for abdominal pain and enlarged AAA. For preoperative cardiovascular risk assessment    -- check TTE  -- check CXR  -- continue metoprolol  -- will provide further risk assessment with TTE results, limited ability given patient's poor history and lack of records    Feng Duff MD    Addendum: Reviewed TTE. Patient ok from cardiac perspective to proceed to TEVAR as per surgical service.    Feng Duff MD

## 2018-01-11 NOTE — PROGRESS NOTE ADULT - SUBJECTIVE AND OBJECTIVE BOX
Interval events:  No acute events overnight. Patient this morning resting. Reports ongoing abd pain.    Tele: None    Medications:  docusate sodium 100 milliGRAM(s) Oral three times a day  heparin  Injectable 5000 Unit(s) SubCutaneous every 8 hours  influenza   Vaccine 0.5 milliLiter(s) IntraMuscular once  lactated ringers. 1000 milliLiter(s) IV Continuous <Continuous>  metoprolol     tartrate 12.5 milliGRAM(s) Oral two times a day  OLANZapine 5 milliGRAM(s) Oral every 6 hours PRN  OLANZapine Injectable 5 milliGRAM(s) IntraMuscular every 6 hours PRN  ondansetron Injectable 4 milliGRAM(s) IV Push every 8 hours PRN    Vitals:  T(C): 36.4 (18 @ 05:43), Max: 36.9 (01-10-18 @ 07:57)  HR: 60 (18 @ 05:43) (60 - 74)  BP: 112/67 (18 @ 05:43) (109/54 - 118/67)  BP(mean): --  RR: 17 (18 @ 05:43) (16 - 18)  SpO2: 100% (18 @ 05:43) (99% - 100%)  Wt(kg): --  Daily     Daily Weight in k.1 (2018 01:21)  I&O's Summary    10 Kang 2018 07:01  -  2018 06:56  --------------------------------------------------------  IN: 500 mL / OUT: 1050 mL / NET: -550 mL    Physical Exam:  NAD  PERRL, EOMI  Normal oral muscosa NC/AT  S1, S2, RRR, No m/r/g appreciated, No edema, no elevation in JVP  Clear to auscultation bilaterally  Soft, Non-tender, Non-distended, BS+  No clubbing, No joint deformity   Non-focal  No lymphadenopathy  AAOx3, Mood & affect appropriate  No rashes, No ecchymoses, No cyanosis    Labs:                        8.2    5.23  )-----------( 284      ( 10 Kang 2018 18:10 )             29.0     01-10    141  |  105  |  19  ----------------------------<  94  3.9   |  25  |  0.76    Ca    8.7      10 Kang 2018 18:10  Phos  3.1     01-10  Mg     1.9     -10      PT/INR - ( 10 Kang 2018 18:10 )   PT: 10.8 SEC;   INR: 0.94          PTT - ( 10 Kang 2018 18:10 )  PTT:26.9 SEC    New results/imaging:  AXR  IMPRESSION:   Nonobstructive bowel gas pattern without evidence of free air.

## 2018-01-12 LAB
ALBUMIN SERPL ELPH-MCNC: 3.6 G/DL — SIGNIFICANT CHANGE UP (ref 3.3–5)
ALP SERPL-CCNC: 77 U/L — SIGNIFICANT CHANGE UP (ref 40–120)
ALT FLD-CCNC: 10 U/L — SIGNIFICANT CHANGE UP (ref 4–41)
APPEARANCE UR: CLEAR — SIGNIFICANT CHANGE UP
APTT BLD: 28.5 SEC — SIGNIFICANT CHANGE UP (ref 27.5–37.4)
AST SERPL-CCNC: 16 U/L — SIGNIFICANT CHANGE UP (ref 4–40)
BASE EXCESS BLDA CALC-SCNC: -0.7 MMOL/L — SIGNIFICANT CHANGE UP
BASE EXCESS BLDA CALC-SCNC: 0 MMOL/L — SIGNIFICANT CHANGE UP
BASE EXCESS BLDA CALC-SCNC: 0.3 MMOL/L — SIGNIFICANT CHANGE UP
BASE EXCESS BLDA CALC-SCNC: 0.5 MMOL/L — SIGNIFICANT CHANGE UP
BILIRUB SERPL-MCNC: 0.2 MG/DL — SIGNIFICANT CHANGE UP (ref 0.2–1.2)
BILIRUB UR-MCNC: NEGATIVE — SIGNIFICANT CHANGE UP
BLD GP AB SCN SERPL QL: NEGATIVE — SIGNIFICANT CHANGE UP
BLOOD UR QL VISUAL: NEGATIVE — SIGNIFICANT CHANGE UP
BUN SERPL-MCNC: 20 MG/DL — SIGNIFICANT CHANGE UP (ref 7–23)
CA-I BLDA-SCNC: 1.15 MMOL/L — SIGNIFICANT CHANGE UP (ref 1.15–1.29)
CA-I BLDA-SCNC: 1.2 MMOL/L — SIGNIFICANT CHANGE UP (ref 1.15–1.29)
CA-I BLDA-SCNC: 1.21 MMOL/L — SIGNIFICANT CHANGE UP (ref 1.15–1.29)
CA-I BLDA-SCNC: 1.23 MMOL/L — SIGNIFICANT CHANGE UP (ref 1.15–1.29)
CALCIUM SERPL-MCNC: 8.5 MG/DL — SIGNIFICANT CHANGE UP (ref 8.4–10.5)
CHLORIDE SERPL-SCNC: 104 MMOL/L — SIGNIFICANT CHANGE UP (ref 98–107)
CO2 SERPL-SCNC: 25 MMOL/L — SIGNIFICANT CHANGE UP (ref 22–31)
COLOR SPEC: SIGNIFICANT CHANGE UP
CREAT SERPL-MCNC: 0.81 MG/DL — SIGNIFICANT CHANGE UP (ref 0.5–1.3)
GLUCOSE BLDA-MCNC: 101 MG/DL — HIGH (ref 70–99)
GLUCOSE BLDA-MCNC: 95 MG/DL — SIGNIFICANT CHANGE UP (ref 70–99)
GLUCOSE BLDA-MCNC: 95 MG/DL — SIGNIFICANT CHANGE UP (ref 70–99)
GLUCOSE BLDA-MCNC: 97 MG/DL — SIGNIFICANT CHANGE UP (ref 70–99)
GLUCOSE SERPL-MCNC: 101 MG/DL — HIGH (ref 70–99)
GLUCOSE UR-MCNC: NEGATIVE — SIGNIFICANT CHANGE UP
HCO3 BLDA-SCNC: 24 MMOL/L — SIGNIFICANT CHANGE UP (ref 22–26)
HCO3 BLDA-SCNC: 25 MMOL/L — SIGNIFICANT CHANGE UP (ref 22–26)
HCT VFR BLD CALC: 30.7 % — LOW (ref 39–50)
HCT VFR BLDA CALC: 24.1 % — LOW (ref 39–51)
HCT VFR BLDA CALC: 26.6 % — LOW (ref 39–51)
HCT VFR BLDA CALC: 26.9 % — LOW (ref 39–51)
HCT VFR BLDA CALC: 27.7 % — LOW (ref 39–51)
HGB BLD-MCNC: 8.8 G/DL — LOW (ref 13–17)
HGB BLDA-MCNC: 7.7 G/DL — LOW (ref 13–17)
HGB BLDA-MCNC: 8.6 G/DL — LOW (ref 13–17)
HGB BLDA-MCNC: 8.7 G/DL — LOW (ref 13–17)
HGB BLDA-MCNC: 8.9 G/DL — LOW (ref 13–17)
INR BLD: 0.9 — SIGNIFICANT CHANGE UP (ref 0.88–1.17)
KETONES UR-MCNC: NEGATIVE — SIGNIFICANT CHANGE UP
LEUKOCYTE ESTERASE UR-ACNC: NEGATIVE — SIGNIFICANT CHANGE UP
MAGNESIUM SERPL-MCNC: 2 MG/DL — SIGNIFICANT CHANGE UP (ref 1.6–2.6)
MCHC RBC-ENTMCNC: 21.1 PG — LOW (ref 27–34)
MCHC RBC-ENTMCNC: 28.7 % — LOW (ref 32–36)
MCV RBC AUTO: 73.6 FL — LOW (ref 80–100)
NITRITE UR-MCNC: NEGATIVE — SIGNIFICANT CHANGE UP
NRBC # FLD: 0 — SIGNIFICANT CHANGE UP
PCO2 BLDA: 37 MMHG — SIGNIFICANT CHANGE UP (ref 35–48)
PCO2 BLDA: 39 MMHG — SIGNIFICANT CHANGE UP (ref 35–48)
PCO2 BLDA: 39 MMHG — SIGNIFICANT CHANGE UP (ref 35–48)
PCO2 BLDA: 40 MMHG — SIGNIFICANT CHANGE UP (ref 35–48)
PH BLDA: 7.4 PH — SIGNIFICANT CHANGE UP (ref 7.35–7.45)
PH BLDA: 7.41 PH — SIGNIFICANT CHANGE UP (ref 7.35–7.45)
PH BLDA: 7.41 PH — SIGNIFICANT CHANGE UP (ref 7.35–7.45)
PH BLDA: 7.43 PH — SIGNIFICANT CHANGE UP (ref 7.35–7.45)
PH UR: 6 — SIGNIFICANT CHANGE UP (ref 4.6–8)
PHOSPHATE SERPL-MCNC: 3 MG/DL — SIGNIFICANT CHANGE UP (ref 2.5–4.5)
PLATELET # BLD AUTO: 305 K/UL — SIGNIFICANT CHANGE UP (ref 150–400)
PMV BLD: 10.7 FL — SIGNIFICANT CHANGE UP (ref 7–13)
PO2 BLDA: 280 MMHG — HIGH (ref 83–108)
PO2 BLDA: 280 MMHG — HIGH (ref 83–108)
PO2 BLDA: 418 MMHG — HIGH (ref 83–108)
PO2 BLDA: 427 MMHG — HIGH (ref 83–108)
POTASSIUM BLDA-SCNC: 4 MMOL/L — SIGNIFICANT CHANGE UP (ref 3.4–4.5)
POTASSIUM BLDA-SCNC: 4 MMOL/L — SIGNIFICANT CHANGE UP (ref 3.4–4.5)
POTASSIUM BLDA-SCNC: 4.1 MMOL/L — SIGNIFICANT CHANGE UP (ref 3.4–4.5)
POTASSIUM BLDA-SCNC: 4.1 MMOL/L — SIGNIFICANT CHANGE UP (ref 3.4–4.5)
POTASSIUM SERPL-MCNC: 4.3 MMOL/L — SIGNIFICANT CHANGE UP (ref 3.5–5.3)
POTASSIUM SERPL-SCNC: 4.3 MMOL/L — SIGNIFICANT CHANGE UP (ref 3.5–5.3)
PROT SERPL-MCNC: 6.6 G/DL — SIGNIFICANT CHANGE UP (ref 6–8.3)
PROT UR-MCNC: NEGATIVE MG/DL — SIGNIFICANT CHANGE UP
PROTHROM AB SERPL-ACNC: 10.3 SEC — SIGNIFICANT CHANGE UP (ref 9.8–13.1)
RBC # BLD: 4.17 M/UL — LOW (ref 4.2–5.8)
RBC # FLD: 24.5 % — HIGH (ref 10.3–14.5)
RBC CASTS # UR COMP ASSIST: SIGNIFICANT CHANGE UP (ref 0–?)
RH IG SCN BLD-IMP: NEGATIVE — SIGNIFICANT CHANGE UP
SAO2 % BLDA: 100 % — HIGH (ref 95–99)
SAO2 % BLDA: 100 % — HIGH (ref 95–99)
SAO2 % BLDA: 99.8 % — HIGH (ref 95–99)
SAO2 % BLDA: 99.9 % — HIGH (ref 95–99)
SODIUM BLDA-SCNC: 136 MMOL/L — SIGNIFICANT CHANGE UP (ref 136–146)
SODIUM BLDA-SCNC: 137 MMOL/L — SIGNIFICANT CHANGE UP (ref 136–146)
SODIUM SERPL-SCNC: 138 MMOL/L — SIGNIFICANT CHANGE UP (ref 135–145)
SP GR SPEC: 1.02 — SIGNIFICANT CHANGE UP (ref 1–1.04)
SQUAMOUS # UR AUTO: SIGNIFICANT CHANGE UP
UROBILINOGEN FLD QL: NORMAL MG/DL — SIGNIFICANT CHANGE UP
WBC # BLD: 5.11 K/UL — SIGNIFICANT CHANGE UP (ref 3.8–10.5)
WBC # FLD AUTO: 5.11 K/UL — SIGNIFICANT CHANGE UP (ref 3.8–10.5)
WBC UR QL: SIGNIFICANT CHANGE UP (ref 0–?)

## 2018-01-12 PROCEDURE — 0254T: CPT

## 2018-01-12 PROCEDURE — 34705 EVAC RPR A-BIILIAC NDGFT: CPT

## 2018-01-12 PROCEDURE — 34713 PERQ ACCESS & CLSR FEM ART: CPT | Mod: 59

## 2018-01-12 PROCEDURE — 37242 VASC EMBOLIZE/OCCLUDE ARTERY: CPT

## 2018-01-12 PROCEDURE — 34712 TCAT DLVR ENHNCD FIXJ DEV: CPT

## 2018-01-12 RX ADMIN — SODIUM CHLORIDE 75 MILLILITER(S): 9 INJECTION, SOLUTION INTRAVENOUS at 05:22

## 2018-01-12 RX ADMIN — Medication 81 MILLIGRAM(S): at 12:30

## 2018-01-12 RX ADMIN — HEPARIN SODIUM 5000 UNIT(S): 5000 INJECTION INTRAVENOUS; SUBCUTANEOUS at 05:22

## 2018-01-12 NOTE — PROGRESS NOTE ADULT - ASSESSMENT
62y male with 6.1 cm infrarenal AAA and abdominal pain    - cards cleared for OR today  - psych meds PRN  - pain control  - NPO/IVF for OR today  - pre-opped and consented for EVAAR today    #[id number]

## 2018-01-12 NOTE — PRE-OP CHECKLIST - IDENTIFICATION BAND VERIFIED
done/pt refusing to wear ID band at this time done/ID band applied to pts left wrist per protocol, pt in agrreement

## 2018-01-12 NOTE — PRE-OP CHECKLIST - COMMENTS
Aspirin 81 milligrams due 12;00,  given po with very small sip H2O per CTEAM, See EMAR. OR holding area notified [facility]

## 2018-01-12 NOTE — PROGRESS NOTE ADULT - SUBJECTIVE AND OBJECTIVE BOX
GENERAL SURGERY DAILY PROGRESS NOTE:       Subjective:  NAOE. Pain controlled. Pt states nose is congested and wants nose irrigated. Pt states he wants ears irrigated.         Objective:    PE:  Gen: NAD  Resp: airway patent, respirations unlabored, no increased WoB  CVS: RRR  Abd: soft, ND, NT, no rebound or guarding  Ext: no edema, WWP  Neuro: AAOx3, no focal deficits    Vital Signs Last 24 Hrs  T(C): 36.6 (2018 12:15), Max: 36.7 (2018 14:51)  T(F): 97.8 (2018 12:15), Max: 98 (2018 14:51)  HR: 61 (2018 12:15) (58 - 83)  BP: 107/67 (2018 12:15) (100/56 - 123/60)  BP(mean): --  RR: 18 (2018 12:15) (17 - 19)  SpO2: 98% (2018 12:15) (98% - 100%)    I&O's Detail    2018 07:01  -  2018 07:00  --------------------------------------------------------  IN:    lactated ringers.: 525 mL    Oral Fluid: 200 mL  Total IN: 725 mL    OUT:    Voided: 525 mL  Total OUT: 525 mL    Total NET: 200 mL      2018 07:01  -  2018 12:46  --------------------------------------------------------  IN:  Total IN: 0 mL    OUT:    Voided: 410 mL  Total OUT: 410 mL    Total NET: -410 mL          Daily Height in cm: 170 (2018 04:22)    Daily     MEDICATIONS  (STANDING):  aspirin  chewable 81 milliGRAM(s) Oral daily  atorvastatin 20 milliGRAM(s) Oral at bedtime  docusate sodium 100 milliGRAM(s) Oral three times a day  heparin  Injectable 5000 Unit(s) SubCutaneous every 8 hours  influenza   Vaccine 0.5 milliLiter(s) IntraMuscular once  lactated ringers. 1000 milliLiter(s) (75 mL/Hr) IV Continuous <Continuous>  metoprolol     tartrate 12.5 milliGRAM(s) Oral two times a day    MEDICATIONS  (PRN):  OLANZapine 5 milliGRAM(s) Oral every 6 hours PRN Agitation  OLANZapine Injectable 5 milliGRAM(s) IntraMuscular every 6 hours PRN Agitation  ondansetron Injectable 4 milliGRAM(s) IV Push every 8 hours PRN Nausea      LABS:                        8.8    5.11  )-----------( 305      ( 2018 06:33 )             30.7     -    138  |  104  |  20  ----------------------------<  101<H>  4.3   |  25  |  0.81    Ca    8.5      2018 06:33  Phos  3.0       Mg     2.0         TPro  6.6  /  Alb  3.6  /  TBili  0.2  /  DBili  x   /  AST  16  /  ALT  10  /  AlkPhos  77  -12    PT/INR - ( 2018 06:33 )   PT: 10.3 SEC;   INR: 0.90          PTT - ( 2018 06:33 )  PTT:28.5 SEC  Urinalysis Basic - ( 2018 05:07 )    Color: PLYEL / Appearance: CLEAR / S.020 / pH: 6.0  Gluc: NEGATIVE / Ketone: NEGATIVE  / Bili: NEGATIVE / Urobili: NORMAL mg/dL   Blood: NEGATIVE / Protein: NEGATIVE mg/dL / Nitrite: NEGATIVE   Leuk Esterase: NEGATIVE / RBC: 0-2 / WBC 0-2   Sq Epi: OCC / Non Sq Epi: x / Bacteria: x        RADIOLOGY & ADDITIONAL STUDIES:

## 2018-01-13 LAB
APPEARANCE UR: CLEAR — SIGNIFICANT CHANGE UP
APTT BLD: 30.8 SEC — SIGNIFICANT CHANGE UP (ref 27.5–37.4)
BASOPHILS # BLD AUTO: 0.06 K/UL — SIGNIFICANT CHANGE UP (ref 0–0.2)
BASOPHILS NFR BLD AUTO: 0.5 % — SIGNIFICANT CHANGE UP (ref 0–2)
BILIRUB UR-MCNC: NEGATIVE — SIGNIFICANT CHANGE UP
BLOOD UR QL VISUAL: HIGH
BUN SERPL-MCNC: 12 MG/DL — SIGNIFICANT CHANGE UP (ref 7–23)
CALCIUM SERPL-MCNC: 7.6 MG/DL — LOW (ref 8.4–10.5)
CHLORIDE SERPL-SCNC: 104 MMOL/L — SIGNIFICANT CHANGE UP (ref 98–107)
CO2 SERPL-SCNC: 25 MMOL/L — SIGNIFICANT CHANGE UP (ref 22–31)
COLOR SPEC: SIGNIFICANT CHANGE UP
CREAT SERPL-MCNC: 0.73 MG/DL — SIGNIFICANT CHANGE UP (ref 0.5–1.3)
EOSINOPHIL # BLD AUTO: 0.1 K/UL — SIGNIFICANT CHANGE UP (ref 0–0.5)
EOSINOPHIL NFR BLD AUTO: 0.9 % — SIGNIFICANT CHANGE UP (ref 0–6)
GLUCOSE SERPL-MCNC: 112 MG/DL — HIGH (ref 70–99)
GLUCOSE UR-MCNC: NEGATIVE — SIGNIFICANT CHANGE UP
HCT VFR BLD CALC: 28.9 % — LOW (ref 39–50)
HGB BLD-MCNC: 8.7 G/DL — LOW (ref 13–17)
IMM GRANULOCYTES # BLD AUTO: 0.07 # — SIGNIFICANT CHANGE UP
IMM GRANULOCYTES NFR BLD AUTO: 0.6 % — SIGNIFICANT CHANGE UP (ref 0–1.5)
INR BLD: 1.09 — SIGNIFICANT CHANGE UP (ref 0.88–1.17)
KETONES UR-MCNC: NEGATIVE — SIGNIFICANT CHANGE UP
LEUKOCYTE ESTERASE UR-ACNC: NEGATIVE — SIGNIFICANT CHANGE UP
LYMPHOCYTES # BLD AUTO: 0.85 K/UL — LOW (ref 1–3.3)
LYMPHOCYTES # BLD AUTO: 7.6 % — LOW (ref 13–44)
MCHC RBC-ENTMCNC: 22.3 PG — LOW (ref 27–34)
MCHC RBC-ENTMCNC: 30.1 % — LOW (ref 32–36)
MCV RBC AUTO: 73.9 FL — LOW (ref 80–100)
MONOCYTES # BLD AUTO: 0.88 K/UL — SIGNIFICANT CHANGE UP (ref 0–0.9)
MONOCYTES NFR BLD AUTO: 7.9 % — SIGNIFICANT CHANGE UP (ref 2–14)
NEUTROPHILS # BLD AUTO: 9.2 K/UL — HIGH (ref 1.8–7.4)
NEUTROPHILS NFR BLD AUTO: 82.5 % — HIGH (ref 43–77)
NITRITE UR-MCNC: NEGATIVE — SIGNIFICANT CHANGE UP
NRBC # FLD: 0 — SIGNIFICANT CHANGE UP
PH UR: 6.5 — SIGNIFICANT CHANGE UP (ref 4.6–8)
PLATELET # BLD AUTO: 217 K/UL — SIGNIFICANT CHANGE UP (ref 150–400)
PMV BLD: 9.9 FL — SIGNIFICANT CHANGE UP (ref 7–13)
POTASSIUM SERPL-MCNC: 3.7 MMOL/L — SIGNIFICANT CHANGE UP (ref 3.5–5.3)
POTASSIUM SERPL-SCNC: 3.7 MMOL/L — SIGNIFICANT CHANGE UP (ref 3.5–5.3)
PROT UR-MCNC: NEGATIVE MG/DL — SIGNIFICANT CHANGE UP
PROTHROM AB SERPL-ACNC: 12.6 SEC — SIGNIFICANT CHANGE UP (ref 9.8–13.1)
RBC # BLD: 3.91 M/UL — LOW (ref 4.2–5.8)
RBC # FLD: 22.7 % — HIGH (ref 10.3–14.5)
RBC CASTS # UR COMP ASSIST: HIGH (ref 0–?)
SODIUM SERPL-SCNC: 138 MMOL/L — SIGNIFICANT CHANGE UP (ref 135–145)
SP GR SPEC: 1.01 — SIGNIFICANT CHANGE UP (ref 1–1.04)
UROBILINOGEN FLD QL: NORMAL MG/DL — SIGNIFICANT CHANGE UP
WBC # BLD: 11.16 K/UL — HIGH (ref 3.8–10.5)
WBC # FLD AUTO: 11.16 K/UL — HIGH (ref 3.8–10.5)
WBC UR QL: SIGNIFICANT CHANGE UP (ref 0–?)

## 2018-01-13 PROCEDURE — 99232 SBSQ HOSP IP/OBS MODERATE 35: CPT

## 2018-01-13 RX ORDER — HYDROMORPHONE HYDROCHLORIDE 2 MG/ML
1 INJECTION INTRAMUSCULAR; INTRAVENOUS; SUBCUTANEOUS
Qty: 0 | Refills: 0 | Status: DISCONTINUED | OUTPATIENT
Start: 2018-01-13 | End: 2018-01-13

## 2018-01-13 RX ORDER — CEFAZOLIN SODIUM 1 G
2000 VIAL (EA) INJECTION EVERY 8 HOURS
Qty: 0 | Refills: 0 | Status: COMPLETED | OUTPATIENT
Start: 2018-01-13 | End: 2018-01-13

## 2018-01-13 RX ORDER — DIPHENHYDRAMINE HCL 50 MG
25 CAPSULE ORAL ONCE
Qty: 0 | Refills: 0 | Status: DISCONTINUED | OUTPATIENT
Start: 2018-01-13 | End: 2018-01-17

## 2018-01-13 RX ORDER — OXYCODONE HYDROCHLORIDE 5 MG/1
5 TABLET ORAL EVERY 4 HOURS
Qty: 0 | Refills: 0 | Status: DISCONTINUED | OUTPATIENT
Start: 2018-01-13 | End: 2018-01-16

## 2018-01-13 RX ORDER — ACETAMINOPHEN 500 MG
650 TABLET ORAL EVERY 6 HOURS
Qty: 0 | Refills: 0 | Status: DISCONTINUED | OUTPATIENT
Start: 2018-01-13 | End: 2018-01-16

## 2018-01-13 RX ORDER — FENTANYL CITRATE 50 UG/ML
25 INJECTION INTRAVENOUS
Qty: 0 | Refills: 0 | Status: DISCONTINUED | OUTPATIENT
Start: 2018-01-13 | End: 2018-01-13

## 2018-01-13 RX ADMIN — ATORVASTATIN CALCIUM 20 MILLIGRAM(S): 80 TABLET, FILM COATED ORAL at 21:24

## 2018-01-13 RX ADMIN — Medication 100 MILLIGRAM(S): at 06:07

## 2018-01-13 RX ADMIN — OXYCODONE HYDROCHLORIDE 5 MILLIGRAM(S): 5 TABLET ORAL at 18:58

## 2018-01-13 RX ADMIN — Medication 100 MILLIGRAM(S): at 06:05

## 2018-01-13 RX ADMIN — HYDROMORPHONE HYDROCHLORIDE 1 MILLIGRAM(S): 2 INJECTION INTRAMUSCULAR; INTRAVENOUS; SUBCUTANEOUS at 04:31

## 2018-01-13 RX ADMIN — FENTANYL CITRATE 25 MICROGRAM(S): 50 INJECTION INTRAVENOUS at 00:30

## 2018-01-13 RX ADMIN — ONDANSETRON 4 MILLIGRAM(S): 8 TABLET, FILM COATED ORAL at 06:05

## 2018-01-13 RX ADMIN — OXYCODONE HYDROCHLORIDE 5 MILLIGRAM(S): 5 TABLET ORAL at 17:55

## 2018-01-13 RX ADMIN — HYDROMORPHONE HYDROCHLORIDE 1 MILLIGRAM(S): 2 INJECTION INTRAMUSCULAR; INTRAVENOUS; SUBCUTANEOUS at 01:57

## 2018-01-13 RX ADMIN — Medication 12.5 MILLIGRAM(S): at 17:55

## 2018-01-13 RX ADMIN — OXYCODONE HYDROCHLORIDE 5 MILLIGRAM(S): 5 TABLET ORAL at 13:50

## 2018-01-13 RX ADMIN — SODIUM CHLORIDE 75 MILLILITER(S): 9 INJECTION, SOLUTION INTRAVENOUS at 00:01

## 2018-01-13 RX ADMIN — Medication 81 MILLIGRAM(S): at 13:20

## 2018-01-13 RX ADMIN — HYDROMORPHONE HYDROCHLORIDE 1 MILLIGRAM(S): 2 INJECTION INTRAMUSCULAR; INTRAVENOUS; SUBCUTANEOUS at 04:44

## 2018-01-13 RX ADMIN — Medication 100 MILLIGRAM(S): at 21:24

## 2018-01-13 RX ADMIN — HEPARIN SODIUM 5000 UNIT(S): 5000 INJECTION INTRAVENOUS; SUBCUTANEOUS at 08:00

## 2018-01-13 RX ADMIN — HYDROMORPHONE HYDROCHLORIDE 1 MILLIGRAM(S): 2 INJECTION INTRAMUSCULAR; INTRAVENOUS; SUBCUTANEOUS at 07:39

## 2018-01-13 RX ADMIN — OXYCODONE HYDROCHLORIDE 5 MILLIGRAM(S): 5 TABLET ORAL at 14:15

## 2018-01-13 RX ADMIN — FENTANYL CITRATE 25 MICROGRAM(S): 50 INJECTION INTRAVENOUS at 00:45

## 2018-01-13 RX ADMIN — Medication 100 MILLIGRAM(S): at 13:34

## 2018-01-13 RX ADMIN — HYDROMORPHONE HYDROCHLORIDE 1 MILLIGRAM(S): 2 INJECTION INTRAMUSCULAR; INTRAVENOUS; SUBCUTANEOUS at 07:16

## 2018-01-13 RX ADMIN — HYDROMORPHONE HYDROCHLORIDE 1 MILLIGRAM(S): 2 INJECTION INTRAMUSCULAR; INTRAVENOUS; SUBCUTANEOUS at 01:47

## 2018-01-13 NOTE — CHART NOTE - NSCHARTNOTEFT_GEN_A_CORE
VASCULAR POST OP CHECK    S: Pt seen and examined. Doing well post operatively. Having some trouble following commands, though seems almost back to baseline, just a little confused and not understandng the periop restrictions. Pain well controlled. Denies CP, SOB, N/V    O:  Gen: Laying in bed, comfortable, but confused  Resp: unlaybored breathing  MSK: BLE PT sig          BL groin dressing c/d/i. palp fem. no hematoma or anurysm palpable    Vital Signs Last 24 Hrs  T(C): 37 (13 Jan 2018 03:00), Max: 37.1 (12 Jan 2018 23:55)  T(F): 98.6 (13 Jan 2018 03:00), Max: 98.8 (12 Jan 2018 23:55)  HR: 77 (13 Jan 2018 05:00) (57 - 87)  BP: 113/62 (13 Jan 2018 05:00) (100/56 - 145/57)  BP(mean): --  RR: 15 (13 Jan 2018 05:00) (12 - 21)  SpO2: 96% (13 Jan 2018 05:00) (93% - 100%)                          8.7    11.16 )-----------( 217      ( 13 Jan 2018 00:15 )             28.9   01-13    138  |  104  |  12  ----------------------------<  112<H>  3.7   |  25  |  0.73    Ca    7.6<L>      13 Jan 2018 00:15  Phos  3.0     01-12  Mg     2.0     01-12    TPro  6.6  /  Alb  3.6  /  TBili  0.2  /  DBili  x   /  AST  16  /  ALT  10  /  AlkPhos  77  01-12      A/P 62 M s/p bilateral percutaneous femoral access, coil embolization of right internal iliac artery, 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  -asa 81 and lipitor in am  -pain control  -bed rest for 8 hrs post operatively  -NPO  -24 hrs of [facility]    Vascular Pager #[phone]

## 2018-01-13 NOTE — PROGRESS NOTE ADULT - SUBJECTIVE AND OBJECTIVE BOX
VASCULAR SURGERY PROGRESS NOTE    S: Patient seen and examined. No acute events overnight postoperatively. Pain well controlled.     O:  Physical Exam:  Gen: Laying in bed, comfortable, but confused  Resp: unlabored breathing  MSK: BLE PT sig          BL groin dressing c/d/i. palp fem. no hematoma or aneurysm palpable  Uro: Adams in place    Vital Signs Last 24 Hrs  T(C): 37 (13 Jan 2018 03:00), Max: 37.1 (12 Jan 2018 23:55)  T(F): 98.6 (13 Jan 2018 03:00), Max: 98.8 (12 Jan 2018 23:55)  HR: 77 (13 Jan 2018 05:00) (57 - 87)  BP: 113/62 (13 Jan 2018 05:00) (100/56 - 145/57)  BP(mean): --  RR: 15 (13 Jan 2018 05:00) (12 - 21)  SpO2: 96% (13 Jan 2018 05:00) (93% - 100%)    I&O's Summary    11 Jan 2018 07:01  -  12 Jan 2018 07:00  --------------------------------------------------------  IN: 725 mL / OUT: 525 mL / NET: 200 mL    12 Jan 2018 07:01  -  13 Jan 2018 05:29  --------------------------------------------------------  IN: 375 mL / OUT: 1035 mL / NET: -660 mL        CAPILLARY BLOOD GLUCOSE                                8.7    11.16 )-----------( 217      ( 13 Jan 2018 00:15 )             28.9       01-13    138  |  104  |  12  ----------------------------<  112<H>  3.7   |  25  |  0.73    Ca    7.6<L>      13 Jan 2018 00:15  Phos  3.0     01-12  Mg     2.0     01-12    TPro  6.6  /  Alb  3.6  /  TBili  0.2  /  DBili  x   /  AST  16  /  ALT  10  /  AlkPhos  77  01-12 VASCULAR SURGERY PROGRESS NOTE    S: Patient seen and examined. No acute events overnight postoperatively. Pain well controlled. Pt complaining of pain from IV's this morning and was explained the importance of needing IV's in the hospital.     O:  Physical Exam:  Gen: Laying in bed, comfortable, but confused  Resp: unlabored breathing  MSK: BLE PT sig          BL groin dressing c/d/i. palp fem, b/l PT signals. no hematoma or aneurysm palpable  Uro: Adams in place    Vital Signs Last 24 Hrs  T(C): 37 (13 Jan 2018 03:00), Max: 37.1 (12 Jan 2018 23:55)  T(F): 98.6 (13 Jan 2018 03:00), Max: 98.8 (12 Jan 2018 23:55)  HR: 77 (13 Jan 2018 05:00) (57 - 87)  BP: 113/62 (13 Jan 2018 05:00) (100/56 - 145/57)  BP(mean): --  RR: 15 (13 Jan 2018 05:00) (12 - 21)  SpO2: 96% (13 Jan 2018 05:00) (93% - 100%)    I&O's Summary    11 Jan 2018 07:01  -  12 Jan 2018 07:00  --------------------------------------------------------  IN: 725 mL / OUT: 525 mL / NET: 200 mL    12 Jan 2018 07:01  -  13 Jan 2018 05:29  --------------------------------------------------------  IN: 375 mL / OUT: 1035 mL / NET: -660 mL        CAPILLARY BLOOD GLUCOSE                                8.7    11.16 )-----------( 217      ( 13 Jan 2018 00:15 )             28.9       01-13    138  |  104  |  12  ----------------------------<  112<H>  3.7   |  25  |  0.73    Ca    7.6<L>      13 Jan 2018 00:15  Phos  3.0     01-12  Mg     2.0     01-12    TPro  6.6  /  Alb  3.6  /  TBili  0.2  /  DBili  x   /  AST  16  /  ALT  10  /  AlkPhos  77  01-12

## 2018-01-13 NOTE — PROGRESS NOTE ADULT - SUBJECTIVE AND OBJECTIVE BOX
24H hour events: s/p TEVAR denies CP/SOB    MEDICATIONS:  aspirin  chewable 81 milliGRAM(s) Oral daily  heparin  Injectable 5000 Unit(s) SubCutaneous every 8 hours  metoprolol     tartrate 12.5 milliGRAM(s) Oral two times a day    ceFAZolin   IVPB 2000 milliGRAM(s) IV Intermittent every 8 hours    diphenhydrAMINE   Injectable 25 milliGRAM(s) IV Push once    fentaNYL    Injectable 25 MICROGram(s) IV Push every 5 minutes PRN  HYDROmorphone  Injectable 1 milliGRAM(s) IV Push every 10 minutes PRN  OLANZapine 5 milliGRAM(s) Oral every 6 hours PRN  OLANZapine Injectable 5 milliGRAM(s) IntraMuscular every 6 hours PRN  ondansetron Injectable 4 milliGRAM(s) IV Push every 8 hours PRN    docusate sodium 100 milliGRAM(s) Oral three times a day    atorvastatin 20 milliGRAM(s) Oral at bedtime    influenza   Vaccine 0.5 milliLiter(s) IntraMuscular once  lactated ringers. 1000 milliLiter(s) IV Continuous <Continuous>      REVIEW OF SYSTEMS:  Complete 10point ROS negative.    PHYSICAL EXAM:  T(C): 36.7 (01-13-18 @ 10:23), Max: 37.1 (01-12-18 @ 23:55)  HR: 56 (01-13-18 @ 10:23) (56 - 87)  BP: 124/49 (01-13-18 @ 10:23) (111/55 - 145/57)  RR: 16 (01-13-18 @ 10:23) (12 - 21)  SpO2: 99% (01-13-18 @ 10:23) (93% - 100%)  Wt(kg): --  I&O's Summary    12 Jan 2018 07:01  -  13 Jan 2018 07:00  --------------------------------------------------------  IN: 600 mL / OUT: 1385 mL / NET: -785 mL    13 Jan 2018 07:01  -  13 Jan 2018 13:13  --------------------------------------------------------  IN: 855 mL / OUT: 400 mL / NET: 455 mL        Appearance: NAD  HEENT:   Normal oral mucosa, EOMI	  Cardiovascular: Normal S1 S2, No JVD, No murmurs, No edema  Respiratory: Lungs clear to auscultation	  Psychiatry: A & O x 3, Mood & affect appropriate  Gastrointestinal:  Soft, Non-tender, + BS	  Skin: No rashes, No ecchymoses, No cyanosis, B/L groin incision c/d/i	  Extremities: No clubbing, cyanosis or edema          LABS:	 	    CBC Full  -  ( 13 Jan 2018 00:15 )  WBC Count : 11.16 K/uL  Hemoglobin : 8.7 g/dL  Hematocrit : 28.9 %  Platelet Count - Automated : 217 K/uL  Mean Cell Volume : 73.9 fL  Mean Cell Hemoglobin : 22.3 pg  Mean Cell Hemoglobin Concentration : 30.1 %  Auto Neutrophil # : 9.20 K/uL  Auto Lymphocyte # : 0.85 K/uL  Auto Monocyte # : 0.88 K/uL  Auto Eosinophil # : 0.10 K/uL  Auto Basophil # : 0.06 K/uL  Auto Neutrophil % : 82.5 %  Auto Lymphocyte % : 7.6 %  Auto Monocyte % : 7.9 %  Auto Eosinophil % : 0.9 %  Auto Basophil % : 0.5 %    01-13    138  |  104  |  12  ----------------------------<  112<H>  3.7   |  25  |  0.73  01-12    138  |  104  |  20  ----------------------------<  101<H>  4.3   |  25  |  0.81    Ca    7.6<L>      13 Jan 2018 00:15  Ca    8.5      12 Jan 2018 06:33  Phos  3.0     01-12  Mg     2.0     01-12    TPro  6.6  /  Alb  3.6  /  TBili  0.2  /  DBili  x   /  AST  16  /  ALT  10  /  AlkPhos  77  01-12    	  ASSESSMENT/PLAN: 	  62M with infra-renal AAA, schizophrenia, ?CABG presents as transfer from [facility] for abdominal pain and enlarged AAA s/p TEVAR tolerated procedure well, TTE EF 63%    -- continue metoprolol  --cont post-op care per primary team    Please call with questions    Joey Wisdom MD

## 2018-01-13 NOTE — BRIEF OPERATIVE NOTE - OPERATION/FINDINGS
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

## 2018-01-13 NOTE — PROGRESS NOTE ADULT - ASSESSMENT
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    ·	NPO/IVF. 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    ·	Reg diet  ·	pain control  ·	psych meds  ·	neurovascular checks  ·	PT/OOB  ·	BP control  ·	continue ASA and lipitor  ·	DVT prophylaxis with [facility]  ·	d/c lopez this AM  ·	pt has purulence at urethral meatus: check UA and Gonococcal/Chlamydia panel    #[id number]

## 2018-01-14 LAB
BUN SERPL-MCNC: 10 MG/DL — SIGNIFICANT CHANGE UP (ref 7–23)
CALCIUM SERPL-MCNC: 8.5 MG/DL — SIGNIFICANT CHANGE UP (ref 8.4–10.5)
CHLORIDE SERPL-SCNC: 103 MMOL/L — SIGNIFICANT CHANGE UP (ref 98–107)
CO2 SERPL-SCNC: 27 MMOL/L — SIGNIFICANT CHANGE UP (ref 22–31)
CREAT SERPL-MCNC: 0.8 MG/DL — SIGNIFICANT CHANGE UP (ref 0.5–1.3)
GLUCOSE SERPL-MCNC: 93 MG/DL — SIGNIFICANT CHANGE UP (ref 70–99)
HCT VFR BLD CALC: 30.7 % — LOW (ref 39–50)
HGB BLD-MCNC: 9 G/DL — LOW (ref 13–17)
MAGNESIUM SERPL-MCNC: 1.9 MG/DL — SIGNIFICANT CHANGE UP (ref 1.6–2.6)
MCHC RBC-ENTMCNC: 22 PG — LOW (ref 27–34)
MCHC RBC-ENTMCNC: 29.3 % — LOW (ref 32–36)
MCV RBC AUTO: 75.1 FL — LOW (ref 80–100)
NRBC # FLD: 0 — SIGNIFICANT CHANGE UP
PHOSPHATE SERPL-MCNC: 2.7 MG/DL — SIGNIFICANT CHANGE UP (ref 2.5–4.5)
PLATELET # BLD AUTO: 280 K/UL — SIGNIFICANT CHANGE UP (ref 150–400)
PMV BLD: 10.6 FL — SIGNIFICANT CHANGE UP (ref 7–13)
POTASSIUM SERPL-MCNC: 3.7 MMOL/L — SIGNIFICANT CHANGE UP (ref 3.5–5.3)
POTASSIUM SERPL-SCNC: 3.7 MMOL/L — SIGNIFICANT CHANGE UP (ref 3.5–5.3)
RBC # BLD: 4.09 M/UL — LOW (ref 4.2–5.8)
RBC # FLD: 23 % — HIGH (ref 10.3–14.5)
SODIUM SERPL-SCNC: 140 MMOL/L — SIGNIFICANT CHANGE UP (ref 135–145)
WBC # BLD: 9.89 K/UL — SIGNIFICANT CHANGE UP (ref 3.8–10.5)
WBC # FLD AUTO: 9.89 K/UL — SIGNIFICANT CHANGE UP (ref 3.8–10.5)

## 2018-01-14 RX ORDER — OXYCODONE HYDROCHLORIDE 5 MG/1
10 TABLET ORAL EVERY 4 HOURS
Qty: 0 | Refills: 0 | Status: DISCONTINUED | OUTPATIENT
Start: 2018-01-14 | End: 2018-01-16

## 2018-01-14 RX ADMIN — OXYCODONE HYDROCHLORIDE 5 MILLIGRAM(S): 5 TABLET ORAL at 12:08

## 2018-01-14 RX ADMIN — Medication 12.5 MILLIGRAM(S): at 17:55

## 2018-01-14 RX ADMIN — OXYCODONE HYDROCHLORIDE 5 MILLIGRAM(S): 5 TABLET ORAL at 11:30

## 2018-01-14 RX ADMIN — Medication 100 MILLIGRAM(S): at 17:55

## 2018-01-14 RX ADMIN — Medication 100 MILLIGRAM(S): at 21:16

## 2018-01-14 RX ADMIN — OXYCODONE HYDROCHLORIDE 10 MILLIGRAM(S): 5 TABLET ORAL at 22:15

## 2018-01-14 RX ADMIN — ATORVASTATIN CALCIUM 20 MILLIGRAM(S): 80 TABLET, FILM COATED ORAL at 21:16

## 2018-01-14 RX ADMIN — Medication 81 MILLIGRAM(S): at 11:30

## 2018-01-14 RX ADMIN — Medication 100 MILLIGRAM(S): at 05:37

## 2018-01-14 RX ADMIN — Medication 12.5 MILLIGRAM(S): at 05:37

## 2018-01-14 RX ADMIN — OXYCODONE HYDROCHLORIDE 10 MILLIGRAM(S): 5 TABLET ORAL at 21:26

## 2018-01-14 NOTE — PROGRESS NOTE ADULT - SUBJECTIVE AND OBJECTIVE BOX
GENERAL SURGERY DAILY PROGRESS NOTE:     Subjective:  NAOE. Pain controlled. Discharge planning pending.         Objective:    PE:  Physical Exam:  Gen: Laying in bed, comfortable, but confused  Resp: unlabored breathing  MSK: b/l LE PT signals, B/l groin dressing c/d/i. palp fem, b/l PT signals. no hematoma or aneurysm palpable      Vital Signs Last 24 Hrs  T(C): 36.6 (2018 09:19), Max: 37.7 (2018 17:54)  T(F): 97.8 (2018 09:19), Max: 99.8 (2018 17:54)  HR: 63 (2018 09:19) (63 - 87)  BP: 130/55 (2018 09:19) (109/53 - 135/66)  BP(mean): --  RR: 15 (2018 09:19) (15 - 21)  SpO2: 96% (2018 09:19) (96% - 100%)    I&O's Detail    2018 07:01  -  2018 07:00  --------------------------------------------------------  IN:    IV PiggyBack: 50 mL    lactated ringers.: 450 mL    Oral Fluid: 720 mL  Total IN: 1220 mL    OUT:    Voided: 3975 mL  Total OUT: 3975 mL    Total NET: -2755 mL      2018 07:01  -  2018 12:40  --------------------------------------------------------  IN:    Oral Fluid: 240 mL  Total IN: 240 mL    OUT:    Voided: 625 mL  Total OUT: 625 mL    Total NET: -385 mL          Daily     Daily Weight in k.1 (2018 01:28)    MEDICATIONS  (STANDING):  aspirin  chewable 81 milliGRAM(s) Oral daily  atorvastatin 20 milliGRAM(s) Oral at bedtime  diphenhydrAMINE   Injectable 25 milliGRAM(s) IV Push once  docusate sodium 100 milliGRAM(s) Oral three times a day  heparin  Injectable 5000 Unit(s) SubCutaneous every 8 hours  influenza   Vaccine 0.5 milliLiter(s) IntraMuscular once  metoprolol     tartrate 12.5 milliGRAM(s) Oral two times a day    MEDICATIONS  (PRN):  acetaminophen   Tablet. 650 milliGRAM(s) Oral every 6 hours PRN Mild Pain (1 - 3)  OLANZapine 5 milliGRAM(s) Oral every 6 hours PRN Agitation  OLANZapine Injectable 5 milliGRAM(s) IntraMuscular every 6 hours PRN Agitation  ondansetron Injectable 4 milliGRAM(s) IV Push every 8 hours PRN Nausea  oxyCODONE    IR 5 milliGRAM(s) Oral every 4 hours PRN Moderate Pain (4 - 6)      LABS:                        9.0    9.89  )-----------( 280      ( 2018 05:46 )             30.7     01-14    140  |  103  |  10  ----------------------------<  93  3.7   |  27  |  0.80    Ca    8.5      2018 05:46  Phos  2.7     01-14  Mg     1.9     01-14      PT/INR - ( 2018 00:15 )   PT: 12.6 SEC;   INR: 1.09          PTT - ( 2018 00:15 )  PTT:30.8 SEC  Urinalysis Basic - ( 2018 13:05 )    Color: COLORL / Appearance: CLEAR / S.008 / pH: 6.5  Gluc: NEGATIVE / Ketone: NEGATIVE  / Bili: NEGATIVE / Urobili: NORMAL mg/dL   Blood: SMALL / Protein: NEGATIVE mg/dL / Nitrite: NEGATIVE   Leuk Esterase: NEGATIVE / RBC: 5-10 / WBC 0-2   Sq Epi: x / Non Sq Epi: x / Bacteria: x        RADIOLOGY & ADDITIONAL STUDIES:

## 2018-01-15 LAB
C TRACH RRNA SPEC QL NAA+PROBE: SIGNIFICANT CHANGE UP
N GONORRHOEA RRNA SPEC QL NAA+PROBE: SIGNIFICANT CHANGE UP
SPECIMEN SOURCE: SIGNIFICANT CHANGE UP

## 2018-01-15 RX ADMIN — HEPARIN SODIUM 5000 UNIT(S): 5000 INJECTION INTRAVENOUS; SUBCUTANEOUS at 21:12

## 2018-01-15 RX ADMIN — Medication 12.5 MILLIGRAM(S): at 05:32

## 2018-01-15 RX ADMIN — Medication 100 MILLIGRAM(S): at 05:32

## 2018-01-15 RX ADMIN — OXYCODONE HYDROCHLORIDE 10 MILLIGRAM(S): 5 TABLET ORAL at 17:51

## 2018-01-15 RX ADMIN — Medication 81 MILLIGRAM(S): at 11:54

## 2018-01-15 RX ADMIN — OXYCODONE HYDROCHLORIDE 10 MILLIGRAM(S): 5 TABLET ORAL at 19:11

## 2018-01-15 NOTE — PROGRESS NOTE ADULT - SUBJECTIVE AND OBJECTIVE BOX
VASCULAR SURGERY PROGRESS NOTE    SUBJECTIVE: Pt seen at bedside. Denies pain, nausea, vomiting. No acute events o/n. Awaiting discharge plans.       Vital Signs Last 24 Hrs  T(C): 36.8 (15 Kang 2018 05:25), Max: 37.4 (2018 17:41)  T(F): 98.3 (15 Kang 2018 05:25), Max: 99.4 (2018 17:41)  HR: 86 (15 Kang 2018 05:25) (61 - 90)  BP: 133/69 (15 Kang 2018 05:25) (111/54 - 133/69)  BP(mean): --  RR: 18 (15 Kang 2018 05:25) (15 - 20)  SpO2: 99% (15 Kang 2018 05:25) (96% - 100%)    Gen: Laying in bed, comfortable, but confused  Resp: unlabored breathing  MSK: b/l LE PT signals, B/l groin dressing c/d/i. palp fem, b/l PT signals. no hematoma or aneurysm palpable    I&O's Summary    2018 07:  -  15 Kang 2018 07:00  --------------------------------------------------------  IN: 720 mL / OUT: 2625 mL / NET: -1905 mL    15 Kang 2018 07:01  -  15 Kang 2018 08:51  --------------------------------------------------------  IN: 0 mL / OUT: 300 mL / NET: -300 mL      I&O's Detail    2018 07:  -  15 Kang 2018 07:00  --------------------------------------------------------  IN:    Oral Fluid: 720 mL  Total IN: 720 mL    OUT:    Voided: 2625 mL  Total OUT: 2625 mL    Total NET: -1905 mL      15 Kang 2018 07:01  -  15 Kang 2018 08:51  --------------------------------------------------------  IN:  Total IN: 0 mL    OUT:    Voided: 300 mL  Total OUT: 300 mL    Total NET: -300 mL          MEDICATIONS  (STANDING):  aspirin  chewable 81 milliGRAM(s) Oral daily  atorvastatin 20 milliGRAM(s) Oral at bedtime  diphenhydrAMINE   Injectable 25 milliGRAM(s) IV Push once  docusate sodium 100 milliGRAM(s) Oral three times a day  heparin  Injectable 5000 Unit(s) SubCutaneous every 8 hours  influenza   Vaccine 0.5 milliLiter(s) IntraMuscular once  metoprolol     tartrate 12.5 milliGRAM(s) Oral two times a day    MEDICATIONS  (PRN):  acetaminophen   Tablet. 650 milliGRAM(s) Oral every 6 hours PRN Mild Pain (1 - 3)  OLANZapine 5 milliGRAM(s) Oral every 6 hours PRN Agitation  OLANZapine Injectable 5 milliGRAM(s) IntraMuscular every 6 hours PRN Agitation  ondansetron Injectable 4 milliGRAM(s) IV Push every 8 hours PRN Nausea  oxyCODONE    IR 5 milliGRAM(s) Oral every 4 hours PRN Moderate Pain (4 - 6)  oxyCODONE    IR 10 milliGRAM(s) Oral every 4 hours PRN Severe Pain (7 - 10)      LABS:                        9.0    9.89  )-----------( 280      ( 2018 05:46 )             30.7     -14    140  |  103  |  10  ----------------------------<  93  3.7   |  27  |  0.80    Ca    8.5      2018 05:46  Phos  2.7     -  Mg     1.9     -        Urinalysis Basic - ( 2018 13:05 )    Color: COLORL / Appearance: CLEAR / S.008 / pH: 6.5  Gluc: NEGATIVE / Ketone: NEGATIVE  / Bili: NEGATIVE / Urobili: NORMAL mg/dL   Blood: SMALL / Protein: NEGATIVE mg/dL / Nitrite: NEGATIVE   Leuk Esterase: NEGATIVE / RBC: 5-10 / WBC 0-2   Sq Epi: x / Non Sq Epi: x / Bacteria: x            RADIOLOGY & ADDITIONAL STUDIES:

## 2018-01-15 NOTE — PROGRESS NOTE ADULT - ASSESSMENT
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    Plan:  - Reg diet  - Pain control  - Psych meds  - PT/OOB  - DVT prophylaxis with [facility]  - f/u Gonococcal/Chlamydia panel  - Pt is homeless, discharge planning. No needs, rehab or PT needed.

## 2018-01-16 ENCOUNTER — TRANSCRIPTION ENCOUNTER (OUTPATIENT)
Age: 63
End: 2018-01-16

## 2018-01-16 PROBLEM — F20.0 PARANOID SCHIZOPHRENIA: Chronic | Status: ACTIVE | Noted: 2018-01-10

## 2018-01-16 PROBLEM — I71.4 ABDOMINAL AORTIC ANEURYSM, WITHOUT RUPTURE: Chronic | Status: ACTIVE | Noted: 2018-01-10

## 2018-01-16 RX ORDER — HALOPERIDOL DECANOATE 100 MG/ML
1 INJECTION INTRAMUSCULAR
Qty: 0 | Refills: 0 | COMMUNITY

## 2018-01-16 RX ORDER — OXYCODONE HYDROCHLORIDE 5 MG/1
10 TABLET ORAL ONCE
Qty: 0 | Refills: 0 | Status: DISCONTINUED | OUTPATIENT
Start: 2018-01-16 | End: 2018-01-16

## 2018-01-16 RX ORDER — ASPIRIN/CALCIUM CARB/MAGNESIUM 324 MG
81 TABLET ORAL
Qty: 0 | Refills: 0 | COMMUNITY

## 2018-01-16 RX ORDER — ACETAMINOPHEN 500 MG
2 TABLET ORAL
Qty: 56 | Refills: 0 | OUTPATIENT
Start: 2018-01-16 | End: 2018-01-22

## 2018-01-16 RX ORDER — OXYCODONE HYDROCHLORIDE 5 MG/1
5 TABLET ORAL EVERY 6 HOURS
Qty: 0 | Refills: 0 | Status: DISCONTINUED | OUTPATIENT
Start: 2018-01-16 | End: 2018-01-16

## 2018-01-16 RX ORDER — METOPROLOL TARTRATE 50 MG
0.5 TABLET ORAL
Qty: 30 | Refills: 0 | OUTPATIENT
Start: 2018-01-16 | End: 2018-02-14

## 2018-01-16 RX ORDER — SIMVASTATIN 20 MG/1
1 TABLET, FILM COATED ORAL
Qty: 0 | Refills: 0 | COMMUNITY

## 2018-01-16 RX ORDER — ACETAMINOPHEN 500 MG
650 TABLET ORAL EVERY 6 HOURS
Qty: 0 | Refills: 0 | Status: DISCONTINUED | OUTPATIENT
Start: 2018-01-16 | End: 2018-01-17

## 2018-01-16 RX ORDER — ASPIRIN/CALCIUM CARB/MAGNESIUM 324 MG
1 TABLET ORAL
Qty: 30 | Refills: 0 | OUTPATIENT
Start: 2018-01-16 | End: 2018-02-14

## 2018-01-16 RX ORDER — ATORVASTATIN CALCIUM 80 MG/1
1 TABLET, FILM COATED ORAL
Qty: 30 | Refills: 0 | OUTPATIENT
Start: 2018-01-16 | End: 2018-02-14

## 2018-01-16 RX ORDER — DOCUSATE SODIUM 100 MG
1 CAPSULE ORAL
Qty: 42 | Refills: 0 | OUTPATIENT
Start: 2018-01-16 | End: 2018-01-29

## 2018-01-16 RX ORDER — QUETIAPINE FUMARATE 200 MG/1
200 TABLET, FILM COATED ORAL
Qty: 0 | Refills: 0 | COMMUNITY

## 2018-01-16 RX ADMIN — OXYCODONE HYDROCHLORIDE 10 MILLIGRAM(S): 5 TABLET ORAL at 22:25

## 2018-01-16 RX ADMIN — OXYCODONE HYDROCHLORIDE 10 MILLIGRAM(S): 5 TABLET ORAL at 21:25

## 2018-01-16 RX ADMIN — ATORVASTATIN CALCIUM 20 MILLIGRAM(S): 80 TABLET, FILM COATED ORAL at 21:25

## 2018-01-16 RX ADMIN — HEPARIN SODIUM 5000 UNIT(S): 5000 INJECTION INTRAVENOUS; SUBCUTANEOUS at 21:25

## 2018-01-16 NOTE — DISCHARGE NOTE ADULT - PLAN OF CARE
wound healing WOUND CARE: b/l groin incisions keep clean and dry  BATHING: Please do not submerge wound underwater. You may shower and/or sponge bathe. It is OK to wash drain wound site.  ACTIVITY: No heavy lifting or straining. Otherwise, you may return to your usual level of physical activity. If you are taking narcotic pain medication (such as Percocet) DO NOT drive a car, operate machinery or make important decisions.  Continue taking blood pressure medications as prescribed.  DIET: Return to your usual diet.  NOTIFY YOUR SURGEON IF: You have any bleeding that does not stop, any pus draining from your wound(s), any fever (over 100.4 F) or chills, persistent nausea/vomiting, persistent diarrhea, or if your pain is not controlled on your discharge pain medications.  FOLLOW-UP: Please follow up with your primary care physician in one week regarding your hospitalization.  Please follow up with Dr. Burnette in one week.  Call [phone] to schedule an appointment.  Please follow up with cardiologist, Dr. Parks in 1-2 weeks, call [phone] to schedule an appointment. Please follow up with [facility] [phone], Monday to Friday, 9am-7pm, walk-in WOUND CARE: b/l groin incisions keep clean and dry  BATHING: Please do not submerge wound underwater. You may shower and/or sponge bathe. It is OK to wash drain wound site.  ACTIVITY: No heavy lifting or straining. Otherwise, you may return to your usual level of physical activity. If you are taking narcotic pain medication (such as Percocet) DO NOT drive a car, operate machinery or make important decisions.  Continue taking blood pressure medications as prescribed.  DIET: Return to your usual diet.  NOTIFY YOUR SURGEON IF: You have any bleeding that does not stop, any pus draining from your wound(s), any fever (over 100.4 F) or chills, persistent nausea/vomiting, persistent diarrhea, or if your pain is not controlled on your discharge pain medications.  FOLLOW-UP: Please follow up with your primary care physician in one week regarding your hospitalization.  You have a follow up appointment with Dr. Burnette scheduled Monday, 1/22/18 at 3pm. Call [phone] with any questions regarding your appointment.  Please follow up with cardiology__________

## 2018-01-16 NOTE — DISCHARGE NOTE ADULT - HOSPITAL COURSE
62y male - known history of infra-renal AAA and diagnosis of schizophrenia - presents as transfer from [facility] for abdominal pain and enlarged AAA. Patient is reporting crampy abdominal pain, 9/10 in severity, constant in periodicity, with no exacerbating/alleviating factors. Denies nausea/vomiting, reports passage of flatus but cannot recall his last BM. He is also reporting pain in both feet with ambulation (the quality of this pain he cannot adequately characterize), which is alleviated with rest. No pain with elevation of lower extremities. Denies any weakness or paresthesias. Denies lightheadedness.   Patient does report intermittent chest pain with physical activity, which is characterized as tightness. No chest pain at present.  Last meal was 10 hours prior to evaluation.    Patient was previously admitted to [facility] in winter of 2015 with abdominal pain. He was found to have a 5.2 cm infrarenal AAA with a concurrent 2.3 cm left ANDERSON aneurysm. A TTE was performed, revealing an EF of 5--55% with no significant ventricular dysfunction or valvular abnormalities. He was also diagnosed with schizophrenia, but found (as per the discharge summary) to have capacity to make medical decisions after Psychiatric evaluation. He refused surgery at the time, and was discharged on haldol, quetiapine, simvastatin and famotidine. He did not report for any follow-up care, and has not taken any medications (aside from periodic ASA 81) in the intervening period. He has a daughter who has been involved in his healthcare in the past (2015), but he cannot recall any of her contact information. He is amenable to surgical treatment at this time.    B/l LE duplex Right and left popliteal arteries appear normal in diameter.  AXR: IMPRESSION: Nonobstructive bowel gas pattern without evidence of free air.    Pt admitted to vascular surgery service.  Cardiology consulted for periop management and optimization for OR.  Given history of schizophrenia, psych consulted for capacity and need for medication, rec PRN meds.  TTE performed CONCLUSIONS: 1. Mitral annular calcification, otherwise normal mitral  valve. Minimal mitral regurgitation. 2. Normal left ventricular internal dimensions and wall thicknesses.  3. Normal left ventricular systolic function. No segmental wall motion abnormalities.  4. Normal right ventricular size and function.  Pt optimized for OR by cardiology and went on 1/13 with Dr. 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  Pt tolerated procedure well, without complication.  Post op pt transferred from PACU to surgical floor.  Pt received periop ancef.  Adams catheter was removed on POD#1 and pt passed TOV.  Diet was restarted and advanced as tolerated.  Pain control was transitioned from IV to PO pain meds.  H/H remained stable.  Pt afebrile with no whit count.  Blood pressure well controlled on low dose beta blocker.  Pt currently ambulating, voiding, tolerating a regular diet, with pain well controlled on PO pain meds.  Per team and attending, pt is hemodynamically stable for discharge ______ to follow up as an outpatient. 62y male - known history of infra-renal AAA and diagnosis of schizophrenia - presents as transfer from [facility] for abdominal pain and enlarged AAA. Patient is reporting crampy abdominal pain, 9/10 in severity, constant in periodicity, with no exacerbating/alleviating factors. Denies nausea/vomiting, reports passage of flatus but cannot recall his last BM. He is also reporting pain in both feet with ambulation (the quality of this pain he cannot adequately characterize), which is alleviated with rest. No pain with elevation of lower extremities. Denies any weakness or paresthesias. Denies lightheadedness.   Patient does report intermittent chest pain with physical activity, which is characterized as tightness. No chest pain at present.  Last meal was 10 hours prior to evaluation.    Patient was previously admitted to [facility] in winter of 2015 with abdominal pain. He was found to have a 5.2 cm infrarenal AAA with a concurrent 2.3 cm left ANDERSON aneurysm. A TTE was performed, revealing an EF of 5--55% with no significant ventricular dysfunction or valvular abnormalities. He was also diagnosed with schizophrenia, but found (as per the discharge summary) to have capacity to make medical decisions after Psychiatric evaluation. He refused surgery at the time, and was discharged on haldol, quetiapine, simvastatin and famotidine. He did not report for any follow-up care, and has not taken any medications (aside from periodic ASA 81) in the intervening period. He has a daughter who has been involved in his healthcare in the past (2015), but he cannot recall any of her contact information. He is amenable to surgical treatment at this time.    B/l LE duplex Right and left popliteal arteries appear normal in diameter.  AXR: IMPRESSION: Nonobstructive bowel gas pattern without evidence of free air.    Pt admitted to vascular surgery service.  Cardiology consulted for periop management and optimization for OR.  Given history of schizophrenia, psych consulted for capacity and need for medication, rec PRN meds.  TTE performed CONCLUSIONS: 1. Mitral annular calcification, otherwise normal mitral  valve. Minimal mitral regurgitation. 2. Normal left ventricular internal dimensions and wall thicknesses.  3. Normal left ventricular systolic function. No segmental wall motion abnormalities.  4. Normal right ventricular size and function.  Pt optimized for OR by cardiology and went on 1/13 with Dr. 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  Pt tolerated procedure well, without complication.  Post op pt transferred from PACU to surgical floor.  Pt received periop ancef.  Adams catheter was removed on POD#1 and pt passed TOV.  Diet was restarted and advanced as tolerated.  Pain control was transitioned from IV to PO pain meds.  H/H remained stable.  Pt afebrile with no whit count.  Blood pressure well controlled on low dose beta blocker.  Pt currently ambulating, voiding, tolerating a regular diet, with pain well controlled on PO pain meds.  Per team and attending, pt is hemodynamically stable for discharge 1/17/18 to follow up as an outpatient.

## 2018-01-16 NOTE — DISCHARGE NOTE ADULT - PATIENT PORTAL LINK FT
“You can access the Follow[facility] Patient Portal, offered by [facility], by registering with the following website: [email or web address]”

## 2018-01-16 NOTE — DISCHARGE NOTE ADULT - ADDITIONAL INSTRUCTIONS
Patient is to follow-up with Psychiatry clinic as outpatient regarding restarting medications and proper dosing. Has not needed PRN medications for agitation during his stay. Was not taking his medications on admission. ([facility] [phone], Monday to Friday, 9am-7pm, walk-in)

## 2018-01-16 NOTE — DISCHARGE NOTE ADULT - CARE PLAN
Principal Discharge DX:	AAA (abdominal aortic aneurysm) without rupture  Goal:	wound healing  Assessment and plan of treatment:	WOUND CARE: b/l groin incisions keep clean and dry  BATHING: Please do not submerge wound underwater. You may shower and/or sponge bathe. It is OK to wash drain wound site.  ACTIVITY: No heavy lifting or straining. Otherwise, you may return to your usual level of physical activity. If you are taking narcotic pain medication (such as Percocet) DO NOT drive a car, operate machinery or make important decisions.  Continue taking blood pressure medications as prescribed.  DIET: Return to your usual diet.  NOTIFY YOUR SURGEON IF: You have any bleeding that does not stop, any pus draining from your wound(s), any fever (over 100.4 F) or chills, persistent nausea/vomiting, persistent diarrhea, or if your pain is not controlled on your discharge pain medications.  FOLLOW-UP: Please follow up with your primary care physician in one week regarding your hospitalization.  Please follow up with Dr. Burnette in one week.  Call [phone] to schedule an appointment.  Please follow up with cardiologist, Dr. Parks in 1-2 weeks, call [phone] to schedule an appointment.  Secondary Diagnosis:	Paranoid schizophrenia  Assessment and plan of treatment:	Please follow up with [facility] [phone], Monday to Friday, 9am-7pm, walk-in Principal Discharge DX:	AAA (abdominal aortic aneurysm) without rupture  Goal:	wound healing  Assessment and plan of treatment:	WOUND CARE: b/l groin incisions keep clean and dry  BATHING: Please do not submerge wound underwater. You may shower and/or sponge bathe. It is OK to wash drain wound site.  ACTIVITY: No heavy lifting or straining. Otherwise, you may return to your usual level of physical activity. If you are taking narcotic pain medication (such as Percocet) DO NOT drive a car, operate machinery or make important decisions.  Continue taking blood pressure medications as prescribed.  DIET: Return to your usual diet.  NOTIFY YOUR SURGEON IF: You have any bleeding that does not stop, any pus draining from your wound(s), any fever (over 100.4 F) or chills, persistent nausea/vomiting, persistent diarrhea, or if your pain is not controlled on your discharge pain medications.  FOLLOW-UP: Please follow up with your primary care physician in one week regarding your hospitalization.  You have a follow up appointment with Dr. Burnette scheduled Monday, 1/22/18 at 3pm. Call [phone] with any questions regarding your appointment.  Please follow up with cardiology__________  Secondary Diagnosis:	Paranoid schizophrenia  Assessment and plan of treatment:	Please follow up with [facility] [phone], Monday to Friday, 9am-7pm, walk-in

## 2018-01-16 NOTE — DISCHARGE NOTE ADULT - CARE PROVIDERS DIRECT ADDRESSES
,[email or web address]

## 2018-01-16 NOTE — DISCHARGE NOTE ADULT - MEDICATION SUMMARY - MEDICATIONS TO TAKE
I will START or STAY ON the medications listed below when I get home from the hospital:    acetaminophen 325 mg oral tablet  -- 2 tab(s) by mouth every 6 hours, As needed, mild and moderate pain  -- Indication: For Mild Pain     aspirin 81 mg oral tablet, chewable  -- 1 tab(s) by mouth once a day  -- Indication: For Anti-platelet therapy    atorvastatin 20 mg oral tablet  -- 1 tab(s) by mouth once a day (at bedtime)  -- Indication: For Hyperlipidemia     metoprolol tartrate 25 mg oral tablet  -- 0.5 tab(s) by mouth every 12 hours   -- It is very important that you take or use this exactly as directed.  Do not skip doses or discontinue unless directed by your doctor.  May cause drowsiness.  Alcohol may intensify this effect.  Use care when operating dangerous machinery.  Some non-prescription drugs may aggravate your condition.  Read all labels carefully.  If a warning appears, check with your doctor before taking.  Take with food or milk.  This drug may impair the ability to drive or operate machinery.  Use care until you become familiar with its effects.    -- Indication: For HTN    famotidine 40 mg oral tablet  -- 1 tab(s) by mouth once a day (at bedtime)  -- Indication: For GERD     docusate sodium 100 mg oral capsule  -- 1 cap(s) by mouth 3 times a day  -- Indication: For Constipation

## 2018-01-16 NOTE — PROGRESS NOTE ADULT - ASSESSMENT
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    Plan:  - Reg diet  - Psych meds  - PT/OOB  - DVT prophylaxis with SQH  - Pt is homeless, discharge planning to shelter today

## 2018-01-16 NOTE — PROGRESS NOTE ADULT - SUBJECTIVE AND OBJECTIVE BOX
VASCULAR SURGERY PROGRESS NOTE    SUBJECTIVE: Pt seen at bedside. Denies pain, nausea, vomiting. No acute events o/n. Patient wants to watch television but is agreeable to be discharged as soon as shelter is set up.     Vital Signs Last 24 Hrs  T(C): 37.3 (16 Jan 2018 05:31), Max: 37.5 (15 Kang 2018 17:27)  T(F): 99.2 (16 Jan 2018 05:31), Max: 99.5 (15 Kang 2018 17:27)  HR: 71 (16 Jan 2018 05:31) (71 - 85)  BP: 114/62 (16 Jan 2018 05:31) (100/62 - 129/74)  BP(mean): --  RR: 17 (16 Jan 2018 05:31) (16 - 18)  SpO2: 100% (16 Jan 2018 05:31) (98% - 100%)      Gen: Laying in bed, comfortable  Resp: unlabored breathing  MSK: b/l LE PT signals, B/l groin dressing c/d/i. palp fem, b/l PT signals., no hematoma or aneurysm palpable        I&O's Summary    15 Kang 2018 07:01  -  16 Jan 2018 07:00  --------------------------------------------------------  IN: 600 mL / OUT: 2900 mL / NET: -2300 mL      I&O's Detail    15 Kang 2018 07:01  -  16 Jan 2018 07:00  --------------------------------------------------------  IN:    Oral Fluid: 600 mL  Total IN: 600 mL    OUT:    Voided: 2900 mL  Total OUT: 2900 mL    Total NET: -2300 mL          MEDICATIONS  (STANDING):  aspirin  chewable 81 milliGRAM(s) Oral daily  atorvastatin 20 milliGRAM(s) Oral at bedtime  diphenhydrAMINE   Injectable 25 milliGRAM(s) IV Push once  docusate sodium 100 milliGRAM(s) Oral three times a day  heparin  Injectable 5000 Unit(s) SubCutaneous every 8 hours  influenza   Vaccine 0.5 milliLiter(s) IntraMuscular once  metoprolol     tartrate 12.5 milliGRAM(s) Oral two times a day    MEDICATIONS  (PRN):  acetaminophen   Tablet. 650 milliGRAM(s) Oral every 6 hours PRN Mild Pain (1 - 3)  OLANZapine 5 milliGRAM(s) Oral every 6 hours PRN Agitation  OLANZapine Injectable 5 milliGRAM(s) IntraMuscular every 6 hours PRN Agitation  ondansetron Injectable 4 milliGRAM(s) IV Push every 8 hours PRN Nausea  oxyCODONE    IR 5 milliGRAM(s) Oral every 4 hours PRN Moderate Pain (4 - 6)  oxyCODONE    IR 10 milliGRAM(s) Oral every 4 hours PRN Severe Pain (7 - 10)      Labs: no new labs

## 2018-01-17 ENCOUNTER — EMERGENCY (EMERGENCY)
Facility: HOSPITAL | Age: 63
LOS: 0 days | Discharge: HOME | End: 2018-01-18
Attending: EMERGENCY MEDICINE | Admitting: EMERGENCY MEDICINE
Payer: MEDICARE

## 2018-01-17 VITALS
OXYGEN SATURATION: 100 % | HEART RATE: 88 BPM | SYSTOLIC BLOOD PRESSURE: 141 MMHG | TEMPERATURE: 98 F | DIASTOLIC BLOOD PRESSURE: 73 MMHG | RESPIRATION RATE: 17 BRPM | WEIGHT: 179.9 LBS | HEIGHT: 70 IN

## 2018-01-17 VITALS
RESPIRATION RATE: 18 BRPM | SYSTOLIC BLOOD PRESSURE: 134 MMHG | TEMPERATURE: 98 F | OXYGEN SATURATION: 100 % | HEART RATE: 76 BPM | DIASTOLIC BLOOD PRESSURE: 66 MMHG

## 2018-01-17 DIAGNOSIS — Z59.0 HOMELESSNESS: ICD-10-CM

## 2018-01-17 DIAGNOSIS — Z98.890 OTHER SPECIFIED POSTPROCEDURAL STATES: Chronic | ICD-10-CM

## 2018-01-17 DIAGNOSIS — R10.9 UNSPECIFIED ABDOMINAL PAIN: ICD-10-CM

## 2018-01-17 DIAGNOSIS — Z09 ENCOUNTER FOR FOLLOW-UP EXAMINATION AFTER COMPLETED TREATMENT FOR CONDITIONS OTHER THAN MALIGNANT NEOPLASM: ICD-10-CM

## 2018-01-17 PROCEDURE — 99283 EMERGENCY DEPT VISIT LOW MDM: CPT

## 2018-01-17 PROCEDURE — 93923 UPR/LXTR ART STDY 3+ LVLS: CPT | Mod: 26

## 2018-01-17 RX ORDER — HALOPERIDOL DECANOATE 100 MG/ML
1 INJECTION INTRAMUSCULAR
Qty: 30 | Refills: 0 | OUTPATIENT
Start: 2018-01-17

## 2018-01-17 RX ADMIN — HEPARIN SODIUM 5000 UNIT(S): 5000 INJECTION INTRAVENOUS; SUBCUTANEOUS at 05:20

## 2018-01-17 RX ADMIN — Medication 12.5 MILLIGRAM(S): at 05:14

## 2018-01-17 SDOH — ECONOMIC STABILITY - HOUSING INSECURITY: HOMELESSNESS: Z59.0

## 2018-01-17 NOTE — ED ADULT TRIAGE NOTE - CHIEF COMPLAINT QUOTE
Pt presents with complaints of abdominal and bilateral knee pain, also stating he feels cold. Pt states he had abdominal sx a week ago in [place]. EMS states pt got of [facility] and walked to [facility].

## 2018-01-17 NOTE — PROGRESS NOTE ADULT - SUBJECTIVE AND OBJECTIVE BOX
VASCULAR SURGERY PROGRESS NOTE    SUBJECTIVE: Pt seen at bedside. Denies pain, nausea, vomiting. No acute events o/n.    Vital Signs Last 24 Hrs  T(C): 36.7 (17 Jan 2018 05:13), Max: 37.6 (16 Jan 2018 17:29)  T(F): 98.1 (17 Jan 2018 05:13), Max: 99.7 (16 Jan 2018 21:22)  HR: 76 (17 Jan 2018 05:13) (76 - 87)  BP: 134/66 (17 Jan 2018 05:13) (114/64 - 134/66)  BP(mean): --  RR: 18 (17 Jan 2018 05:13) (16 - 20)  SpO2: 100% (17 Jan 2018 05:13) (98% - 100%)        Gen: Laying in bed, comfortable  Resp: unlabored breathing  MSK: b/l LE PT signals, B/l groin dressing c/d/i. palp fem, b/l PT signals., no hematoma or aneurysm palpable      I&O's Summary    16 Jan 2018 07:01  -  17 Jan 2018 07:00  --------------------------------------------------------  IN: 600 mL / OUT: 2075 mL / NET: -1475 mL    17 Jan 2018 07:01  -  17 Jan 2018 08:53  --------------------------------------------------------  IN: 0 mL / OUT: 325 mL / NET: -325 mL      I&O's Detail    16 Jan 2018 07:01  -  17 Jan 2018 07:00  --------------------------------------------------------  IN:    Oral Fluid: 600 mL  Total IN: 600 mL    OUT:    Voided: 2075 mL  Total OUT: 2075 mL    Total NET: -1475 mL      17 Jan 2018 07:01  -  17 Jan 2018 08:53  --------------------------------------------------------  IN:  Total IN: 0 mL    OUT:    Voided: 325 mL  Total OUT: 325 mL    Total NET: -325 mL          MEDICATIONS  (STANDING):  aspirin  chewable 81 milliGRAM(s) Oral daily  atorvastatin 20 milliGRAM(s) Oral at bedtime  diphenhydrAMINE   Injectable 25 milliGRAM(s) IV Push once  docusate sodium 100 milliGRAM(s) Oral three times a day  heparin  Injectable 5000 Unit(s) SubCutaneous every 8 hours  influenza   Vaccine 0.5 milliLiter(s) IntraMuscular once  metoprolol     tartrate 12.5 milliGRAM(s) Oral two times a day    MEDICATIONS  (PRN):  acetaminophen   Tablet. 650 milliGRAM(s) Oral every 6 hours PRN mild and moderate pain  OLANZapine 5 milliGRAM(s) Oral every 6 hours PRN Agitation  OLANZapine Injectable 5 milliGRAM(s) IntraMuscular every 6 hours PRN Agitation  ondansetron Injectable 4 milliGRAM(s) IV Push every 8 hours PRN Nausea      LABS:                    RADIOLOGY & ADDITIONAL STUDIES:

## 2018-01-17 NOTE — PROGRESS NOTE ADULT - ASSESSMENT
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    Plan:  - Reg diet  - Psych meds  - PT/OOB  - DVT prophylaxis with SQH  - LUDWIG/PVR prior to d/c   - Pt is homeless, discharge planning to shelter today    Vascular Surgery [id number]

## 2018-01-17 NOTE — PROGRESS NOTE ADULT - PROVIDER SPECIALTY LIST ADULT
Cardiology
Cardiology
Vascular Surgery

## 2018-01-18 VITALS
SYSTOLIC BLOOD PRESSURE: 139 MMHG | OXYGEN SATURATION: 98 % | RESPIRATION RATE: 19 BRPM | DIASTOLIC BLOOD PRESSURE: 78 MMHG | HEART RATE: 84 BPM | TEMPERATURE: 98 F

## 2018-01-18 NOTE — ED ADULT NURSE NOTE - OBJECTIVE STATEMENT
Pt presented to ED c/o abd pain. As per pt's been experiencing some abd pain to surgical site since today. S/P discharged from [facility] today after AAA surgical repair. Pt described the pain as "pain to the surgical site". Does not c/o n&V. Pt also stated of HA "probably related to stress after dealing with  today". In addition, pt c/o B/L knee pain but denies trauma or no indication of injury noted. Pt ambulate freely in the ED. No other complains noted at this time.

## 2018-01-18 NOTE — ED PROVIDER NOTE - OBJECTIVE STATEMENT
"I'm homeless"  61 yo male with extensive PMH, including aortic aneurysm repaired this week at [facility], discharged this morning. Pt was discharged to department of  who arranged for him to go to [facility].  Pt did not make it to [facility] on time today and comes to ED seeking shelter. Pt c/o headache "from all the stress" of being at  all day, as well as abdominal pains which is not new and he attributes to his procedures, and knee pain because he walked to the shelter from [facility].  Pt otherwise has no medical complaints.

## 2018-01-18 NOTE — ED ADULT NURSE NOTE - CHPI ED SYMPTOMS NEG
no vomiting/no weakness/no numbness/no nausea/no fever/no dizziness/no tingling/no chills/no decreased eating/drinking

## 2018-01-18 NOTE — ED PROVIDER NOTE - GASTROINTESTINAL, MLM
Abdomen soft, non-tender, no guarding. Abdomen soft, non-tender, no guarding.  Groin incisions non swollen, good pulse, no thrill.

## 2018-01-18 NOTE — ED ADULT NURSE NOTE - CHIEF COMPLAINT QUOTE
Pt presents with complaints of abdominal and bilateral knee pain, also stating he feels cold. Pt states he had abdominal sx a week ago in [place]. EMS states pt got of [facility] and walked to [facility].

## 2018-01-19 ENCOUNTER — TRANSCRIPTION ENCOUNTER (OUTPATIENT)
Age: 63
End: 2018-01-19

## 2018-01-22 ENCOUNTER — APPOINTMENT (OUTPATIENT)
Dept: VASCULAR SURGERY | Facility: CLINIC | Age: 63
End: 2018-01-22

## 2018-01-30 ENCOUNTER — INPATIENT (INPATIENT)
Facility: HOSPITAL | Age: 63
LOS: 30 days | Discharge: HOME | End: 2018-03-02
Attending: PSYCHIATRY & NEUROLOGY | Admitting: PSYCHIATRY & NEUROLOGY
Payer: MEDICARE

## 2018-01-30 VITALS — HEIGHT: 65 IN | WEIGHT: 134.92 LBS

## 2018-01-30 DIAGNOSIS — I71.4 ABDOMINAL AORTIC ANEURYSM, WITHOUT RUPTURE: Chronic | ICD-10-CM

## 2018-01-30 DIAGNOSIS — R69 ILLNESS, UNSPECIFIED: ICD-10-CM

## 2018-01-30 DIAGNOSIS — Z98.890 OTHER SPECIFIED POSTPROCEDURAL STATES: Chronic | ICD-10-CM

## 2018-01-30 DIAGNOSIS — F20.0 PARANOID SCHIZOPHRENIA: ICD-10-CM

## 2018-01-30 DIAGNOSIS — F39 UNSPECIFIED MOOD [AFFECTIVE] DISORDER: ICD-10-CM

## 2018-01-30 LAB
ALBUMIN SERPL ELPH-MCNC: 3.5 G/DL — SIGNIFICANT CHANGE UP (ref 3.3–5)
ALP SERPL-CCNC: 143 U/L — HIGH (ref 40–120)
ALT FLD-CCNC: 18 U/L — SIGNIFICANT CHANGE UP (ref 12–78)
AMPHET UR-MCNC: NEGATIVE — SIGNIFICANT CHANGE UP
ANION GAP SERPL CALC-SCNC: 7 MMOL/L — SIGNIFICANT CHANGE UP (ref 5–17)
ANISOCYTOSIS BLD QL: SLIGHT — SIGNIFICANT CHANGE UP
APAP SERPL-MCNC: < 2 UG/ML (ref 10–30)
APPEARANCE UR: CLEAR — SIGNIFICANT CHANGE UP
APTT BLD: 28.9 SEC — SIGNIFICANT CHANGE UP (ref 27.5–37.4)
AST SERPL-CCNC: 21 U/L — SIGNIFICANT CHANGE UP (ref 15–37)
BACTERIA # UR AUTO: (no result)
BARBITURATES UR SCN-MCNC: NEGATIVE — SIGNIFICANT CHANGE UP
BASOPHILS # BLD AUTO: 0.2 K/UL — SIGNIFICANT CHANGE UP (ref 0–0.2)
BASOPHILS NFR BLD AUTO: 1.6 % — SIGNIFICANT CHANGE UP (ref 0–2)
BENZODIAZ UR-MCNC: NEGATIVE — SIGNIFICANT CHANGE UP
BILIRUB SERPL-MCNC: 0.5 MG/DL — SIGNIFICANT CHANGE UP (ref 0.2–1.2)
BILIRUB UR-MCNC: NEGATIVE — SIGNIFICANT CHANGE UP
BUN SERPL-MCNC: 20 MG/DL — SIGNIFICANT CHANGE UP (ref 7–23)
BURR CELLS BLD QL SMEAR: PRESENT — SIGNIFICANT CHANGE UP
CALCIUM SERPL-MCNC: 9.1 MG/DL — SIGNIFICANT CHANGE UP (ref 8.5–10.1)
CHLORIDE SERPL-SCNC: 104 MMOL/L — SIGNIFICANT CHANGE UP (ref 96–108)
CO2 SERPL-SCNC: 25 MMOL/L — SIGNIFICANT CHANGE UP (ref 22–31)
COCAINE METAB.OTHER UR-MCNC: NEGATIVE — SIGNIFICANT CHANGE UP
COLOR SPEC: YELLOW — SIGNIFICANT CHANGE UP
CREAT SERPL-MCNC: 0.85 MG/DL — SIGNIFICANT CHANGE UP (ref 0.5–1.3)
DIFF PNL FLD: (no result)
ELLIPTOCYTES BLD QL SMEAR: SLIGHT — SIGNIFICANT CHANGE UP
EOSINOPHIL # BLD AUTO: 0.2 K/UL — SIGNIFICANT CHANGE UP (ref 0–0.5)
EOSINOPHIL NFR BLD AUTO: 1.8 % — SIGNIFICANT CHANGE UP (ref 0–6)
EPI CELLS # UR: SIGNIFICANT CHANGE UP
ETHANOL SERPL-MCNC: <10 MG/DL — SIGNIFICANT CHANGE UP (ref 0–10)
GLUCOSE SERPL-MCNC: 90 MG/DL — SIGNIFICANT CHANGE UP (ref 70–99)
GLUCOSE UR QL: NEGATIVE MG/DL — SIGNIFICANT CHANGE UP
HBA1C BLD-MCNC: 5.2 % — SIGNIFICANT CHANGE UP (ref 4–5.6)
HCT VFR BLD CALC: 33.3 % — LOW (ref 39–50)
HGB BLD-MCNC: 10.3 G/DL — LOW (ref 13–17)
INR BLD: 1.15 RATIO — SIGNIFICANT CHANGE UP (ref 0.88–1.16)
KETONES UR-MCNC: NEGATIVE — SIGNIFICANT CHANGE UP
LEUKOCYTE ESTERASE UR-ACNC: NEGATIVE — SIGNIFICANT CHANGE UP
LG PLATELETS BLD QL AUTO: SLIGHT — SIGNIFICANT CHANGE UP
LYMPHOCYTES # BLD AUTO: 1.4 K/UL — SIGNIFICANT CHANGE UP (ref 1–3.3)
LYMPHOCYTES # BLD AUTO: 14.9 % — SIGNIFICANT CHANGE UP (ref 13–44)
MACROCYTES BLD QL: SLIGHT — SIGNIFICANT CHANGE UP
MANUAL DIF COMMENT BLD-IMP: SIGNIFICANT CHANGE UP
MCHC RBC-ENTMCNC: 23.3 PG — LOW (ref 27–34)
MCHC RBC-ENTMCNC: 31 GM/DL — LOW (ref 32–36)
MCV RBC AUTO: 75.3 FL — LOW (ref 80–100)
METHADONE UR-MCNC: NEGATIVE — SIGNIFICANT CHANGE UP
MICROCYTES BLD QL: SLIGHT — SIGNIFICANT CHANGE UP
MONOCYTES # BLD AUTO: 0.8 K/UL — SIGNIFICANT CHANGE UP (ref 0–0.9)
MONOCYTES NFR BLD AUTO: 8.9 % — SIGNIFICANT CHANGE UP (ref 2–14)
NEUTROPHILS # BLD AUTO: 6.8 K/UL — SIGNIFICANT CHANGE UP (ref 1.8–7.4)
NEUTROPHILS NFR BLD AUTO: 72.8 % — SIGNIFICANT CHANGE UP (ref 43–77)
NITRITE UR-MCNC: NEGATIVE — SIGNIFICANT CHANGE UP
OPIATES UR-MCNC: NEGATIVE — SIGNIFICANT CHANGE UP
PCP SPEC-MCNC: SIGNIFICANT CHANGE UP
PCP UR-MCNC: NEGATIVE — SIGNIFICANT CHANGE UP
PH UR: 5 — SIGNIFICANT CHANGE UP (ref 5–8)
PLAT MORPH BLD: NORMAL — SIGNIFICANT CHANGE UP
PLATELET # BLD AUTO: 431 K/UL — HIGH (ref 150–400)
POIKILOCYTOSIS BLD QL AUTO: SLIGHT — SIGNIFICANT CHANGE UP
POLYCHROMASIA BLD QL SMEAR: SLIGHT — SIGNIFICANT CHANGE UP
POTASSIUM SERPL-MCNC: 4.2 MMOL/L — SIGNIFICANT CHANGE UP (ref 3.5–5.3)
POTASSIUM SERPL-SCNC: 4.2 MMOL/L — SIGNIFICANT CHANGE UP (ref 3.5–5.3)
PROT SERPL-MCNC: 8.4 GM/DL — HIGH (ref 6–8.3)
PROT UR-MCNC: 15 MG/DL
PROTHROM AB SERPL-ACNC: 12.5 SEC — SIGNIFICANT CHANGE UP (ref 9.8–12.7)
RBC # BLD: 4.42 M/UL — SIGNIFICANT CHANGE UP (ref 4.2–5.8)
RBC # FLD: 20 % — HIGH (ref 10.3–14.5)
RBC BLD AUTO: (no result)
RBC CASTS # UR COMP ASSIST: SIGNIFICANT CHANGE UP /HPF (ref 0–4)
SALICYLATES SERPL-MCNC: 2.5 MG/DL — LOW (ref 2.8–20)
SCHISTOCYTES BLD QL AUTO: SLIGHT — SIGNIFICANT CHANGE UP
SODIUM SERPL-SCNC: 136 MMOL/L — SIGNIFICANT CHANGE UP (ref 135–145)
SP GR SPEC: 1.02 — SIGNIFICANT CHANGE UP (ref 1.01–1.02)
THC UR QL: NEGATIVE — SIGNIFICANT CHANGE UP
TSH SERPL-MCNC: 1.79 UU/ML — SIGNIFICANT CHANGE UP (ref 0.36–3.74)
UROBILINOGEN FLD QL: NEGATIVE MG/DL — SIGNIFICANT CHANGE UP
WBC # BLD: 9.4 K/UL — SIGNIFICANT CHANGE UP (ref 3.8–10.5)
WBC # FLD AUTO: 9.4 K/UL — SIGNIFICANT CHANGE UP (ref 3.8–10.5)
WBC UR QL: SIGNIFICANT CHANGE UP

## 2018-01-30 PROCEDURE — 93010 ELECTROCARDIOGRAM REPORT: CPT

## 2018-01-30 PROCEDURE — 70450 CT HEAD/BRAIN W/O DYE: CPT | Mod: 26

## 2018-01-30 PROCEDURE — 99285 EMERGENCY DEPT VISIT HI MDM: CPT

## 2018-01-30 RX ORDER — FERROUS SULFATE 325(65) MG
325 TABLET ORAL DAILY
Qty: 0 | Refills: 0 | Status: DISCONTINUED | OUTPATIENT
Start: 2018-01-30 | End: 2018-03-02

## 2018-01-30 RX ORDER — OLANZAPINE 15 MG/1
5 TABLET, FILM COATED ORAL
Qty: 0 | Refills: 0 | Status: DISCONTINUED | OUTPATIENT
Start: 2018-01-30 | End: 2018-03-02

## 2018-01-30 RX ORDER — OLANZAPINE 15 MG/1
5 TABLET, FILM COATED ORAL ONCE
Qty: 0 | Refills: 0 | Status: COMPLETED | OUTPATIENT
Start: 2018-01-30 | End: 2018-01-30

## 2018-01-30 RX ORDER — OLANZAPINE 15 MG/1
7.5 TABLET, FILM COATED ORAL ONCE
Qty: 0 | Refills: 0 | Status: COMPLETED | OUTPATIENT
Start: 2018-01-30 | End: 2018-02-01

## 2018-01-30 RX ORDER — DIPHENHYDRAMINE HCL 50 MG
50 CAPSULE ORAL ONCE
Qty: 0 | Refills: 0 | Status: DISCONTINUED | OUTPATIENT
Start: 2018-01-30 | End: 2018-01-30

## 2018-01-30 RX ORDER — HALOPERIDOL DECANOATE 100 MG/ML
5 INJECTION INTRAMUSCULAR ONCE
Qty: 0 | Refills: 0 | Status: DISCONTINUED | OUTPATIENT
Start: 2018-01-30 | End: 2018-01-30

## 2018-01-30 RX ADMIN — OLANZAPINE 5 MILLIGRAM(S): 15 TABLET, FILM COATED ORAL at 11:42

## 2018-01-30 NOTE — ED BEHAVIORAL HEALTH ASSESSMENT NOTE - DIFFERENTIAL
Bipolar Disorder with psychotic features, Schizoaffective Disorder, Mental disorder due to general medical condition

## 2018-01-30 NOTE — PATIENT PROFILE BEHAVIORAL HEALTH - PSH
AAA (abdominal aortic aneurysm)  Endoscopic repair  H/O hernia repair    History of left knee surgery    History of right knee surgery

## 2018-01-30 NOTE — PATIENT PROFILE BEHAVIORAL HEALTH - NS PRO TALK SOMEONE YN
Spoke with patient who stated that she was in the car and would call back to schedule her appt.   refused to answer/unable to assess

## 2018-01-30 NOTE — ED PROVIDER NOTE - PROGRESS NOTE DETAILS
Robles Tubbs DO (Attending): Patient increasingly menacing, refusing to stay in behavioral health room.  Code Ceballos called as patient is walking at the threshold of other patient rooms while speaking in hallway.  Security arrived, patient still refusing to go into room.  Chemical restraints to be ordered for patient/staff safety, ativan/benadryl/haldol. Instead of Haldol, Benadryl, Ativan, will give Zyprexa 5mg IM, was called by psych, pt with previous visit under different MRN stating Haldol allergy Robles Tubbs DO (Attending): Discussed case w/ psychiatry NP Veto, saw patient, will admit to Dr. Cervantes.  Requesting CT Head to be performed.

## 2018-01-30 NOTE — ED BEHAVIORAL HEALTH ASSESSMENT NOTE - DESCRIPTION
Pt is acutely irritable, agitated, angry aggressive, demanding,  verbally and threatening, cursing and physically up close in the faces of staff member, in his contact, refusing treatment, required IM sedation.  Pt appears internally preoccupied and was observed talking to self and other unusual mannerisms, reported by staff.  Pt is paranoid and appears psychotic, possible manic.  Pt verbally threatening to all he comes in contact with. Endoscopic AAA at [facility] Jan, 2016 homeless, uncooperative with social history, Reports he has a daughter who lives in [place].

## 2018-01-30 NOTE — ED PROVIDER NOTE - OBJECTIVE STATEMENT
63 y/o M presents to the ED being brought in by [facility] for AMS, agitation with other residents while at [facility] earlier this morning. Upon arrival to the ED pt was non cooperative with initial vitals, admits to smoking and drinking EtOH. Pt now pacing in behavioral health holding, answering questioning with inappropriate answers, not allowing physical assessment. Pt unable to provide reliable hx at this time, stating that he "is experiencing pain all over his body".
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
## 2018-01-30 NOTE — PATIENT PROFILE BEHAVIORAL HEALTH - INFORMATION COULD NOT BE OBTAINED DETAILS
patient refused to speak with writer patient refused to cooperate, refused to answer questions; per report, patient's mother Ami Harman could be support person, however, no phone number is provided patient refused to answer

## 2018-01-30 NOTE — ED PROVIDER NOTE - MEDICAL DECISION MAKING DETAILS
61 y/o M presents with [facility] for AMS and agitation while at [facility] facility, non cooperative with physical exam, vitals with plans to consult psych, receive 5mg Haldol, 2mg Ativan. 63 y/o M presents with [facility] for AMS and agitation while at [facility] facility, non cooperative with physical exam, vitals with plans to consult psych, security CO

## 2018-01-30 NOTE — ED PROVIDER NOTE - NS_ ATTENDINGSCRIBEDETAILS _ED_A_ED_FT
Robles Tubbs DO (Attending): The history, relevant review of systems, past medical and surgical history, medical decision making, and physical examination was documented by the scribe in my presence and I attest to the accuracy of the documentation.

## 2018-01-30 NOTE — ED BEHAVIORAL HEALTH ASSESSMENT NOTE - AXIS IV
Problems with interaction with legal system/Problems with primary support/Problem related to social environment/Housing problems/Economic problems/Problems with access to healthcare services

## 2018-01-30 NOTE — H&P ADULT - HISTORY OF PRESENT ILLNESS
61 yo M with PMH of Paranoid schizophrenia, recent Sx Ectoscopics AAA repair BIB [facility] from [facility] for agitation, hostile and aggressive behavior. Pt is walking in hallway in 5N, still agitated. Denies any acute c/o. Denies headache, dizziness, chest pain, palpitations or SOB. Denies fever, chills, cough or dysuria. Denies any GI/ bleeding. Current smoker smokes 10 cigs per day. Pt was seen and examined at 5N in the  presence of night RN. Medicine consult is called of medical Mx.

## 2018-01-30 NOTE — H&P ADULT - ASSESSMENT
63 yo M with PMH of Paranoid schizophrenia, recent Sx Ectoscopics AAA repair BIB [facility] from [facility] for agitation, hostile and aggressive behavior.    # Paranoid schizophrenia/Agitation/Psych problems  - Manage as per primary Psych team    # Anemia  - H/H: 10.3/33.3  - Pt denies active GI or  bleeding  - No past EMR to compare  - Stool guiac/Serum Iron/Ferritin/TIBC  - Monitor CBC  - Iron supplements    # Dehydration  - Encourage oral hydration    # Thrombocytosis Likely reactive  - Monitor Plt    # Elevated ALP  - No abdominal pain, recent AAA repair  - Outpt f/u with PMD after discharge for further eval    # DVT Ppx  - Ambulation    Case d/w Dr. Kimball 61 yo M with PMH of Paranoid schizophrenia, recent Sx Ectoscopics AAA repair BIB [facility] from [facility] for agitation, hostile and aggressive behavior.    # Paranoid schizophrenia/Agitation/Psych problems  - Manage as per primary Psych team    # Anemia  - H/H: 10.3/33.3  - Pt denies active GI or  bleeding  - In 01/2018 : Hb from 8 to 9 - Improved Hb as compared to earlier this month  - Stool guiac/Serum Iron/Ferritin/TIBC  - Monitor CBC  - Iron supplements    # Dehydration  - Encourage oral hydration    # Thrombocytosis Likely reactive  - Monitor Plt    # Elevated ALP  - No abdominal pain, recent AAA repair  - Outpt f/u with PMD after discharge for further eval    # DVT Ppx  - Ambulation    Case d/w Dr. Kimball

## 2018-01-30 NOTE — ED BEHAVIORAL HEALTH ASSESSMENT NOTE - RISK ASSESSMENT
mod potential risk to self and others due to impaired judgement, aggression, provacative and threatening behavior and irritated dysregulated mood.  Pt has limited protective factors, is homeless, and  lack of support systems and is noncompliant with treatement.  No known h/o self harm and has been able to avoid need for psych admission for a long time.

## 2018-01-30 NOTE — ED ADULT NURSE REASSESSMENT NOTE - NS ED NURSE REASSESS COMMENT FT1
Pt became aggressive when I showed him that I was placing his wallet into a secure enevople., towards staff, security called. Pt stood inches away from this RN's face and stated " fuck you, you bitch" , I asked pt to step back, pt took one step backwards, pt stated " you're time will come and so will his, why are you trying to strong arm me. Fuck you" Again pt approached this RN and stood a few inches away from my face and screamed " Fuck You". security arrived, verbal deescalation continued with no success. Zyprexa given as ordered. 1:1 remains at door, pt in bed, all safety maintained.

## 2018-01-30 NOTE — ED BEHAVIORAL HEALTH ASSESSMENT NOTE - SUICIDE RISK FACTORS
Unable to engage in safety planning/Mood episode/Chronic pain or acute medical issue/Agitation/severe anxiety
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
## 2018-01-30 NOTE — ED BEHAVIORAL HEALTH ASSESSMENT NOTE - SUMMARY
62 yoAAm, homeless living at [facility], PPH Paranoid Schizophrenia by history, remote past psych admissions per recent record, noncompliant with treatment, unknown substance abuse status, uncooperative with interview, agitated, aggressive hostile, threatening and paranoid.  Pt guarded throughout interview which he could not tolerate.  Pt has presents altered from previous eval, 2 weeks ago.  Due to   aggression, menacing and  threatening behavior,  with paranoia, would recommend in pt  psych admission, as Pt is a  potential danger to self and others.

## 2018-01-30 NOTE — PATIENT PROFILE BEHAVIORAL HEALTH - NS PRO CONTRA FLU  NO ACCESS
patient refused to answer questions patient uncooperative with interview and refused to answer questions patient refused to answer

## 2018-01-30 NOTE — PATIENT PROFILE BEHAVIORAL HEALTH - NS PRO CONTRA FLU 1
unable to assess immunization status/patient refused to answer unable to assess immunization status no

## 2018-01-30 NOTE — H&P ADULT - NSHPPHYSICALEXAM_GEN_ALL_CORE
Vital Signs Last 24 Hrs  T(C): 36.2 (30 Jan 2018 15:57), Max: 36.2 (30 Jan 2018 15:57)  T(F): 97.2 (30 Jan 2018 15:57), Max: 97.2 (30 Jan 2018 15:57)  HR: 66 (30 Jan 2018 15:57) (66 - 97)  BP: 120/58 (30 Jan 2018 15:57) (120/58 - 128/73)  RR: 16 (30 Jan 2018 15:57) (16 - 19)  SpO2: 100% (30 Jan 2018 15:57) (100% - 100%)

## 2018-01-31 PROCEDURE — 93010 ELECTROCARDIOGRAM REPORT: CPT

## 2018-01-31 RX ORDER — RISPERIDONE 4 MG/1
1 TABLET ORAL
Qty: 0 | Refills: 0 | Status: DISCONTINUED | OUTPATIENT
Start: 2018-01-31 | End: 2018-02-02

## 2018-01-31 RX ADMIN — OLANZAPINE 5 MILLIGRAM(S): 15 TABLET, FILM COATED ORAL at 09:16

## 2018-01-31 RX ADMIN — Medication 325 MILLIGRAM(S): at 09:16

## 2018-01-31 NOTE — PROGRESS NOTE BEHAVIORAL HEALTH - NSBHFUPINTERVALCCFT_PSY_A_CORE
Patient says he does not want to be told what to do because he is 62 years oldHas various aches and pains as well as complaint of being homeless

## 2018-01-31 NOTE — PROGRESS NOTE BEHAVIORAL HEALTH - NSBHFUPINTERVALHXFT_PSY_A_CORE
patient became agitated at staff when told he should not keep flushing the toilet.  Made hypersexual comment to female nurse  MEDICATIONS  (STANDING):  ferrous    sulfate 325 milliGRAM(s) Oral daily  risperiDONE  Disintegrating Tablet 1 milliGRAM(s) Oral two times a day

## 2018-01-31 NOTE — PROGRESS NOTE BEHAVIORAL HEALTH - NSBHCHARTREVIEWVS_PSY_A_CORE FT
Vital Signs Last 24 Hrs  T(C): 36.4 (31 Jan 2018 08:44), Max: 36.5 (30 Jan 2018 16:30)  T(F): 97.5 (31 Jan 2018 08:44), Max: 97.7 (30 Jan 2018 16:30)  HR: 89 (31 Jan 2018 08:44) (66 - 97)  BP: 105/78 (31 Jan 2018 08:44) (105/78 - 120/58)  BP(mean): --  RR: 16 (31 Jan 2018 08:44) (16 - 18)  SpO2: 100% (31 Jan 2018 08:44) (100% - 100%)

## 2018-02-01 RX ORDER — OLANZAPINE 15 MG/1
10 TABLET, FILM COATED ORAL EVERY 6 HOURS
Qty: 0 | Refills: 0 | Status: DISCONTINUED | OUTPATIENT
Start: 2018-02-01 | End: 2018-03-02

## 2018-02-01 RX ORDER — OLANZAPINE 15 MG/1
10 TABLET, FILM COATED ORAL ONCE
Qty: 0 | Refills: 0 | Status: COMPLETED | OUTPATIENT
Start: 2018-02-01 | End: 2018-02-01

## 2018-02-01 RX ORDER — OLANZAPINE 15 MG/1
10 TABLET, FILM COATED ORAL ONCE
Qty: 0 | Refills: 0 | Status: DISCONTINUED | OUTPATIENT
Start: 2018-02-01 | End: 2018-02-01

## 2018-02-01 RX ORDER — OLANZAPINE 15 MG/1
10 TABLET, FILM COATED ORAL EVERY 6 HOURS
Qty: 0 | Refills: 0 | Status: DISCONTINUED | OUTPATIENT
Start: 2018-02-01 | End: 2018-02-01

## 2018-02-01 RX ADMIN — RISPERIDONE 1 MILLIGRAM(S): 4 TABLET ORAL at 21:56

## 2018-02-01 RX ADMIN — OLANZAPINE 10 MILLIGRAM(S): 15 TABLET, FILM COATED ORAL at 07:30

## 2018-02-01 RX ADMIN — RISPERIDONE 1 MILLIGRAM(S): 4 TABLET ORAL at 09:23

## 2018-02-01 RX ADMIN — Medication 325 MILLIGRAM(S): at 09:23

## 2018-02-01 NOTE — PROVIDER CONTACT NOTE (MEDICATION) - SITUATION
Pt's agitation and aggression escalating this am. Pt verbally abusing and threatening staff. Pt unable to be redirected.

## 2018-02-01 NOTE — PROGRESS NOTE BEHAVIORAL HEALTH - NSBHFUPINTERVALHXFT_PSY_A_CORE
Needed IM medication for agitation and a limited tiem in seclusion  Took prescribed medication Needed IM medication for agitation and a limited time in seclusion  Took prescribed medication  Remains paranoid and disorganized     MEDICATIONS  (STANDING):  ferrous    sulfate 325 milliGRAM(s) Oral daily  risperiDONE  Disintegrating Tablet 1 milliGRAM(s) Oral two times a day    MEDICATIONS  (PRN):  LORazepam     Tablet 2 milliGRAM(s) Oral every 8 hours PRN Agitation  LORazepam   Injectable 2 milliGRAM(s) IntraMuscular every 6 hours PRN Agitation  OLANZapine 5 milliGRAM(s) Oral two times a day PRN agitation/psychosis

## 2018-02-02 RX ORDER — RISPERIDONE 4 MG/1
2 TABLET ORAL
Qty: 0 | Refills: 0 | Status: DISCONTINUED | OUTPATIENT
Start: 2018-02-02 | End: 2018-02-08

## 2018-02-02 RX ADMIN — RISPERIDONE 2 MILLIGRAM(S): 4 TABLET ORAL at 21:36

## 2018-02-02 RX ADMIN — Medication 325 MILLIGRAM(S): at 09:17

## 2018-02-02 RX ADMIN — RISPERIDONE 1 MILLIGRAM(S): 4 TABLET ORAL at 09:18

## 2018-02-02 NOTE — PROGRESS NOTE BEHAVIORAL HEALTH - NSBHFUPINTERVALHXFT_PSY_A_CORE
patient remains disorganized but has been in better control   taking meds as prescribed.  Will continue titrating risperidone

## 2018-02-02 NOTE — PROGRESS NOTE BEHAVIORAL HEALTH - NSBHCHARTREVIEWVS_PSY_A_CORE FT
Vital Signs Last 24 Hrs  T(C): 35.6 (02 Feb 2018 08:31), Max: 35.6 (02 Feb 2018 08:31)  T(F): 96 (02 Feb 2018 08:31), Max: 96 (02 Feb 2018 08:31)  HR: 102 (02 Feb 2018 08:31) (102 - 102)  BP: 146/67 (02 Feb 2018 08:31) (146/67 - 146/67)  BP(mean): --  RR: 14 (02 Feb 2018 08:31) (14 - 14)  SpO2: 100% (02 Feb 2018 08:31) (100% - 100%)

## 2018-02-03 RX ADMIN — Medication 325 MILLIGRAM(S): at 08:58

## 2018-02-03 RX ADMIN — RISPERIDONE 2 MILLIGRAM(S): 4 TABLET ORAL at 09:00

## 2018-02-03 RX ADMIN — RISPERIDONE 2 MILLIGRAM(S): 4 TABLET ORAL at 20:51

## 2018-02-03 NOTE — PROGRESS NOTE BEHAVIORAL HEALTH - NSBHFUPINTERVALHXFT_PSY_A_CORE
patient remains disorganized but has been in better control   taking meds as prescribed.  Will continue titrating risperidone Covering MD Note ( 2/3/18)   Pt seen. Remains reclusive and markedly paranoid, guarded and argumentative seemingly in response to pt misperceived 'threats' from others ( peers / staff) Covering MD Note ( 2/3/18)   Pt seen. Remains reclusive and markedly paranoid, guarded and argumentative seemingly in response to pt misperceived 'threats' from others ( peers / staff).  Pt is tolerating current med regimen of Risperdal 2 mg po bid without side effects and with clinical efficacy still too early to fully gauge.   Pt currently too thought disordered to meaningfully  participate in therapy groups.    Plan to continue current med regimen and disposition planning as per Dr Cervantes

## 2018-02-03 NOTE — PROGRESS NOTE BEHAVIORAL HEALTH - NSBHFUPINTERVALCCFT_PSY_A_CORE
' I'm doing alright thank you. I don't want any snack. I'll wait for dinner." ' I'm doing alright. I don't want any snack. I'll wait until dinner."

## 2018-02-03 NOTE — PROGRESS NOTE BEHAVIORAL HEALTH - NSBHCHARTREVIEWVS_PSY_A_CORE FT
Vital Signs Last 24 Hrs  T(C): 35.6 (02 Feb 2018 08:31), Max: 35.6 (02 Feb 2018 08:31)  T(F): 96 (02 Feb 2018 08:31), Max: 96 (02 Feb 2018 08:31)  HR: 102 (02 Feb 2018 08:31) (102 - 102)  BP: 146/67 (02 Feb 2018 08:31) (146/67 - 146/67)  BP(mean): --  RR: 14 (02 Feb 2018 08:31) (14 - 14)  SpO2: 100% (02 Feb 2018 08:31) (100% - 100%) Vital Signs Last 24 Hrs  T(C): 35.6 (03 Feb 2018 08:18), Max: 35.6 (03 Feb 2018 08:18)  T(F): 96 (03 Feb 2018 08:18), Max: 96 (03 Feb 2018 08:18)  HR: 100 (03 Feb 2018 08:18) (100 - 100)  BP: 131/76 (03 Feb 2018 08:18) (131/76 - 131/76)  BP(mean): --  RR: 16 (03 Feb 2018 08:18) (16 - 16)  SpO2: 99% (03 Feb 2018 08:18) (99% - 99%)

## 2018-02-04 RX ADMIN — RISPERIDONE 2 MILLIGRAM(S): 4 TABLET ORAL at 21:38

## 2018-02-04 RX ADMIN — Medication 325 MILLIGRAM(S): at 10:02

## 2018-02-04 RX ADMIN — RISPERIDONE 2 MILLIGRAM(S): 4 TABLET ORAL at 10:02

## 2018-02-04 NOTE — PROGRESS NOTE BEHAVIORAL HEALTH - OTHER
observed talking to self unable to assess due in part to pt's significant paranoia  and reluctance to disclose 'too much' information

## 2018-02-04 NOTE — PROGRESS NOTE BEHAVIORAL HEALTH - NSBHFUPINTERVALHXFT_PSY_A_CORE
Covering MD Note ( 2/4/18)   Pt seen. Remains reclusive and still paranoid, and guarded though less quickly argumentative seemingly in response to pt misperceived 'threats' from others ( peers / staff). Pt not able to tolerate therapy groups currently despite staff encouragement due to the pt's ongoing, significantly impairing paranoia.  Pt is tolerating current med regimen of Risperdal 2 mg po bid without side effects and with clinical efficacy still too early to fully gauge.      Plan to continue current med regimen and disposition planning as per Dr Cervantes

## 2018-02-04 NOTE — PROGRESS NOTE BEHAVIORAL HEALTH - NSBHCHARTREVIEWVS_PSY_A_CORE FT
Vital Signs Last 24 Hrs  T(C): 35.6 (04 Feb 2018 08:44), Max: 35.6 (04 Feb 2018 08:44)  T(F): 96.1 (04 Feb 2018 08:44), Max: 96.1 (04 Feb 2018 08:44)  HR: 110 (04 Feb 2018 08:44) (110 - 110)  BP: 108/61 (04 Feb 2018 08:44) (108/61 - 108/61)  BP(mean): --  RR: 12 (04 Feb 2018 08:44) (12 - 12)  SpO2: 97% (04 Feb 2018 08:44) (97% - 97%)

## 2018-02-05 RX ADMIN — RISPERIDONE 2 MILLIGRAM(S): 4 TABLET ORAL at 09:13

## 2018-02-05 RX ADMIN — RISPERIDONE 2 MILLIGRAM(S): 4 TABLET ORAL at 21:46

## 2018-02-05 RX ADMIN — Medication 325 MILLIGRAM(S): at 09:14

## 2018-02-05 NOTE — PROGRESS NOTE BEHAVIORAL HEALTH - NSBHCHARTREVIEWVS_PSY_A_CORE FT
Vital Signs Last 24 Hrs  T(C): 36.3 (05 Feb 2018 09:18), Max: 36.3 (05 Feb 2018 09:18)  T(F): 97.4 (05 Feb 2018 09:18), Max: 97.4 (05 Feb 2018 09:18)  HR: 108 (05 Feb 2018 09:18) (108 - 108)  BP: 150/72 (05 Feb 2018 09:18) (150/72 - 150/72)  BP(mean): --  RR: 16 (05 Feb 2018 09:18) (16 - 16)  SpO2: 99% (05 Feb 2018 09:18) (99% - 99%)

## 2018-02-05 NOTE — PROGRESS NOTE BEHAVIORAL HEALTH - NSBHFUPINTERVALHXFT_PSY_A_CORE
patient has been in better control but remains edgy and generally paranoid.  Also appears forgetful and although he says he would like to leave he has no real plan or idea where he will go.    MEDICATIONS  (STANDING):  ferrous    sulfate 325 milliGRAM(s) Oral daily  risperiDONE  Disintegrating Tablet 2 milliGRAM(s) Oral two times a day    MEDICATIONS  (PRN):  LORazepam     Tablet 2 milliGRAM(s) Oral every 8 hours PRN Agitation  OLANZapine 5 milliGRAM(s) Oral two times a day PRN agitation/psychosis  OLANZapine Injectable 10 milliGRAM(s) IntraMuscular every 6 hours PRN agitation
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
## 2018-02-06 RX ADMIN — Medication 325 MILLIGRAM(S): at 09:29

## 2018-02-06 RX ADMIN — RISPERIDONE 2 MILLIGRAM(S): 4 TABLET ORAL at 09:29

## 2018-02-06 RX ADMIN — RISPERIDONE 2 MILLIGRAM(S): 4 TABLET ORAL at 21:27

## 2018-02-06 NOTE — PROGRESS NOTE BEHAVIORAL HEALTH - NSBHFUPINTERVALHXFT_PSY_A_CORE
Patient cooperative this Am but in general is angry and somewhat paranoid.  it is difficult to determine if this is his baseline.  Will attempt to reach corroborative sources.    MEDICATIONS  (STANDING):  ferrous    sulfate 325 milliGRAM(s) Oral daily  risperiDONE  Disintegrating Tablet 2 milliGRAM(s) Oral two times a day

## 2018-02-06 NOTE — PROGRESS NOTE BEHAVIORAL HEALTH - NSBHFUPINTERVALHXFT_PSY_A_CORE
Patient remains paranoid- yelled at another patient last night who was trying to be helpful to them  Don"t f-ing talk to me he said  Was verbally threatening    MEDICATIONS  (STANDING):  ferrous    sulfate 325 milliGRAM(s) Oral daily  risperiDONE  Disintegrating Tablet 2 milliGRAM(s) Oral two times a day

## 2018-02-06 NOTE — PROGRESS NOTE BEHAVIORAL HEALTH - NSBHCHARTREVIEWVS_PSY_A_CORE FT
Vital Signs Last 24 Hrs  T(C): 36.7 (06 Feb 2018 08:40), Max: 36.7 (06 Feb 2018 08:40)  T(F): 98 (06 Feb 2018 08:40), Max: 98 (06 Feb 2018 08:40)  HR: 86 (06 Feb 2018 08:40) (86 - 86)  BP: 100/83 (06 Feb 2018 08:40) (100/83 - 100/83)  BP(mean): --  RR: 14 (06 Feb 2018 08:40) (14 - 14)  SpO2: 100% (06 Feb 2018 08:40) (100% - 100%)

## 2018-02-07 RX ADMIN — Medication 325 MILLIGRAM(S): at 09:09

## 2018-02-07 RX ADMIN — RISPERIDONE 2 MILLIGRAM(S): 4 TABLET ORAL at 20:14

## 2018-02-07 RX ADMIN — RISPERIDONE 2 MILLIGRAM(S): 4 TABLET ORAL at 09:09

## 2018-02-07 NOTE — PROGRESS NOTE BEHAVIORAL HEALTH - NSBHCHARTREVIEWVS_PSY_A_CORE FT
Vital Signs Last 24 Hrs  T(C): 35.7 (07 Feb 2018 07:54), Max: 35.7 (07 Feb 2018 07:54)  T(F): 96.3 (07 Feb 2018 07:54), Max: 96.3 (07 Feb 2018 07:54)  HR: 115 (07 Feb 2018 07:54) (115 - 115)  BP: 98/72 (07 Feb 2018 07:54) (98/72 - 98/72)  BP(mean): --  RR: 14 (07 Feb 2018 07:54) (14 - 14)  SpO2: 98% (07 Feb 2018 07:54) (98% - 98%)

## 2018-02-07 NOTE — PROGRESS NOTE BEHAVIORAL HEALTH - NSBHFUPINTERVALHXFT_PSY_A_CORE
patient has been more isolative and subdued.  remains paranoid but less overtly hostile to staff and peers    MEDICATIONS  (STANDING):  ferrous    sulfate 325 milliGRAM(s) Oral daily  risperiDONE  Disintegrating Tablet 2 milliGRAM(s) Oral two times a day

## 2018-02-08 RX ORDER — RISPERIDONE 4 MG/1
3 TABLET ORAL
Qty: 0 | Refills: 0 | Status: DISCONTINUED | OUTPATIENT
Start: 2018-02-08 | End: 2018-02-13

## 2018-02-08 RX ADMIN — Medication 325 MILLIGRAM(S): at 08:43

## 2018-02-08 RX ADMIN — RISPERIDONE 3 MILLIGRAM(S): 4 TABLET ORAL at 21:03

## 2018-02-08 RX ADMIN — RISPERIDONE 2 MILLIGRAM(S): 4 TABLET ORAL at 08:43

## 2018-02-08 NOTE — PROGRESS NOTE BEHAVIORAL HEALTH - NSBHFUPINTERVALHXFT_PSY_A_CORE
patient had been more reserved and is superficially polite but he remains paranoid, apparently delusional that he also has a medical degree.  Told Sw he would not be taking medications upon discharge but it is not totally clear whether he would take them in order to get housing.     MEDICATIONS  (STANDING):  ferrous    sulfate 325 milliGRAM(s) Oral daily  risperiDONE  Disintegrating Tablet 3 milliGRAM(s) Oral two times a day    MEDICATIONS  (PRN):  LORazepam     Tablet 2 milliGRAM(s) Oral every 8 hours PRN Agitation  OLANZapine 5 milliGRAM(s) Oral two times a day PRN agitation/psychosis  OLANZapine Injectable 10 milliGRAM(s) IntraMuscular every 6 hours PRN agitation
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
## 2018-02-08 NOTE — PROGRESS NOTE BEHAVIORAL HEALTH - NSBHCHARTREVIEWVS_PSY_A_CORE FT
Vital Signs Last 24 Hrs  T(C): 35.6 (08 Feb 2018 08:27), Max: 35.6 (08 Feb 2018 08:27)  T(F): 96.1 (08 Feb 2018 08:27), Max: 96.1 (08 Feb 2018 08:27)  HR: 113 (08 Feb 2018 08:27) (113 - 113)  BP: 127/60 (08 Feb 2018 08:27) (127/60 - 127/60)  BP(mean): --  RR: 14 (08 Feb 2018 08:27) (14 - 14)  SpO2: 97% (08 Feb 2018 08:27) (97% - 97%)

## 2018-02-09 RX ADMIN — RISPERIDONE 3 MILLIGRAM(S): 4 TABLET ORAL at 09:00

## 2018-02-09 RX ADMIN — RISPERIDONE 3 MILLIGRAM(S): 4 TABLET ORAL at 21:27

## 2018-02-09 RX ADMIN — Medication 325 MILLIGRAM(S): at 09:00
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
## 2018-02-09 NOTE — PROGRESS NOTE BEHAVIORAL HEALTH - NSBHCHARTREVIEWVS_PSY_A_CORE FT
Vital Signs Last 24 Hrs  T(C): 36.7 (09 Feb 2018 08:41), Max: 36.7 (09 Feb 2018 08:41)  T(F): 98.1 (09 Feb 2018 08:41), Max: 98.1 (09 Feb 2018 08:41)  HR: 88 (09 Feb 2018 08:41) (88 - 88)  BP: 142/78 (09 Feb 2018 08:41) (142/78 - 142/78)  BP(mean): --  RR: 14 (09 Feb 2018 08:41) (14 - 14)  SpO2: 100% (09 Feb 2018 08:41) (100% - 100%)

## 2018-02-09 NOTE — PROGRESS NOTE BEHAVIORAL HEALTH - NSBHFUPINTERVALHXFT_PSY_A_CORE
Patient remains irritable but has not had physical altercations  Remains highly irritable and disorganized.  MEDICATIONS  (STANDING):  ferrous    sulfate 325 milliGRAM(s) Oral daily  risperiDONE  Disintegrating Tablet 3 milliGRAM(s) Oral two times a day    MEDICATIONS  (PRN):  OLANZapine 5 milliGRAM(s) Oral two times a day PRN agitation/psychosis  OLANZapine Injectable 10 milliGRAM(s) IntraMuscular every 6 hours PRN agitation

## 2018-02-10 RX ADMIN — RISPERIDONE 3 MILLIGRAM(S): 4 TABLET ORAL at 09:31

## 2018-02-10 RX ADMIN — RISPERIDONE 3 MILLIGRAM(S): 4 TABLET ORAL at 21:44

## 2018-02-10 RX ADMIN — Medication 325 MILLIGRAM(S): at 09:31

## 2018-02-11 RX ADMIN — Medication 325 MILLIGRAM(S): at 09:23

## 2018-02-11 RX ADMIN — RISPERIDONE 3 MILLIGRAM(S): 4 TABLET ORAL at 09:23

## 2018-02-11 RX ADMIN — RISPERIDONE 3 MILLIGRAM(S): 4 TABLET ORAL at 21:20

## 2018-02-12 RX ADMIN — RISPERIDONE 3 MILLIGRAM(S): 4 TABLET ORAL at 08:35

## 2018-02-12 RX ADMIN — RISPERIDONE 3 MILLIGRAM(S): 4 TABLET ORAL at 20:54

## 2018-02-12 RX ADMIN — OLANZAPINE 10 MILLIGRAM(S): 15 TABLET, FILM COATED ORAL at 12:07

## 2018-02-12 RX ADMIN — Medication 325 MILLIGRAM(S): at 08:35

## 2018-02-12 NOTE — PROGRESS NOTE BEHAVIORAL HEALTH - NSBHFUPINTERVALHXFT_PSY_A_CORE
Patient had been superficially cooperative but easily irritated and quickly becoming hostile and threatening  Needed Im Zyprexa and seclusion due to agitated and threatening behavior    MEDICATIONS  (STANDING):  ferrous    sulfate 325 milliGRAM(s) Oral daily  risperiDONE  Disintegrating Tablet 3 milliGRAM(s) Oral two times a day    MEDICATIONS  (PRN):  OLANZapine 5 milliGRAM(s) Oral two times a day PRN agitation/psychosis  OLANZapine Injectable 10 milliGRAM(s) IntraMuscular every 6 hours PRN agitation
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
## 2018-02-12 NOTE — PROGRESS NOTE BEHAVIORAL HEALTH - NSBHCHARTREVIEWVS_PSY_A_CORE FT
Vital Signs Last 24 Hrs  T(C): 36.1 (12 Feb 2018 07:33), Max: 36.1 (12 Feb 2018 07:33)  T(F): 97 (12 Feb 2018 07:33), Max: 97 (12 Feb 2018 07:33)  HR: 98 (12 Feb 2018 07:33) (98 - 98)  BP: 124/70 (12 Feb 2018 07:33) (124/70 - 124/70)  BP(mean): --  RR: 16 (12 Feb 2018 07:33) (16 - 16)  SpO2: 98% (12 Feb 2018 07:33) (98% - 98%)

## 2018-02-13 RX ORDER — OLANZAPINE 15 MG/1
10 TABLET, FILM COATED ORAL
Qty: 0 | Refills: 0 | Status: DISCONTINUED | OUTPATIENT
Start: 2018-02-13 | End: 2018-02-20

## 2018-02-13 RX ADMIN — OLANZAPINE 10 MILLIGRAM(S): 15 TABLET, FILM COATED ORAL at 21:15

## 2018-02-13 RX ADMIN — RISPERIDONE 3 MILLIGRAM(S): 4 TABLET ORAL at 09:18

## 2018-02-13 RX ADMIN — Medication 325 MILLIGRAM(S): at 09:18

## 2018-02-13 NOTE — PROGRESS NOTE BEHAVIORAL HEALTH - NSBHFUPINTERVALHXFT_PSY_A_CORE
Patient remains paranoid, quickly hostile when told he cannot leave, verbally abusive.  Will change from risperidone to Zyprexa as risperidone has been ineffective
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
## 2018-02-13 NOTE — PROGRESS NOTE BEHAVIORAL HEALTH - NSBHCHARTREVIEWVS_PSY_A_CORE FT
Vital Signs Last 24 Hrs  T(C): 37.1 (13 Feb 2018 08:41), Max: 37.1 (13 Feb 2018 08:41)  T(F): 98.7 (13 Feb 2018 08:41), Max: 98.7 (13 Feb 2018 08:41)  HR: 93 (13 Feb 2018 08:41) (93 - 93)  BP: 133/72 (13 Feb 2018 08:41) (133/72 - 133/72)  BP(mean): --  RR: 12 (13 Feb 2018 08:41) (12 - 12)  SpO2: 98% (13 Feb 2018 08:41) (98% - 98%)

## 2018-02-14 RX ORDER — ASPIRIN/CALCIUM CARB/MAGNESIUM 324 MG
650 TABLET ORAL ONCE
Qty: 0 | Refills: 0 | Status: COMPLETED | OUTPATIENT
Start: 2018-02-14 | End: 2018-02-14

## 2018-02-14 RX ADMIN — OLANZAPINE 10 MILLIGRAM(S): 15 TABLET, FILM COATED ORAL at 20:56

## 2018-02-14 RX ADMIN — Medication 325 MILLIGRAM(S): at 08:52

## 2018-02-14 RX ADMIN — Medication 650 MILLIGRAM(S): at 19:25

## 2018-02-14 RX ADMIN — OLANZAPINE 10 MILLIGRAM(S): 15 TABLET, FILM COATED ORAL at 08:52

## 2018-02-14 NOTE — PROGRESS NOTE BEHAVIORAL HEALTH - NSBHFUPINTERVALHXFT_PSY_A_CORE
Continues to have paranoid ideas and underlying hostility.  As risperidone was ineffective in controling paranoia or organizing thoughts, pedication was changed to Zyprexa

## 2018-02-14 NOTE — PROGRESS NOTE BEHAVIORAL HEALTH - NSBHFUPINTERVALCCFT_PSY_A_CORE
Patient askign to leave- Again superficialy pleasant  and became somewhat agitated when told he could not leave

## 2018-02-14 NOTE — PROGRESS NOTE BEHAVIORAL HEALTH - NSBHCHARTREVIEWVS_PSY_A_CORE FT
Vital Signs Last 24 Hrs  T(C): 36.3 (14 Feb 2018 08:27), Max: 36.3 (14 Feb 2018 08:27)  T(F): 97.4 (14 Feb 2018 08:27), Max: 97.4 (14 Feb 2018 08:27)  HR: 101 (14 Feb 2018 08:27) (101 - 101)  BP: 121/73 (14 Feb 2018 08:27) (121/73 - 121/73)  BP(mean): --  RR: 12 (14 Feb 2018 08:27) (12 - 12)  SpO2: 99% (14 Feb 2018 08:27) (99% - 99%)

## 2018-02-15 RX ADMIN — OLANZAPINE 10 MILLIGRAM(S): 15 TABLET, FILM COATED ORAL at 08:37

## 2018-02-15 RX ADMIN — Medication 325 MILLIGRAM(S): at 08:37

## 2018-02-15 RX ADMIN — OLANZAPINE 10 MILLIGRAM(S): 15 TABLET, FILM COATED ORAL at 20:57

## 2018-02-15 NOTE — PROGRESS NOTE BEHAVIORAL HEALTH - NSBHCHARTREVIEWVS_PSY_A_CORE FT
Vital Signs Last 24 Hrs  T(C): 36.1 (15 Feb 2018 07:28), Max: 36.1 (15 Feb 2018 07:28)  T(F): 97 (15 Feb 2018 07:28), Max: 97 (15 Feb 2018 07:28)  HR: 110 (15 Feb 2018 07:28) (110 - 110)  BP: 132/83 (15 Feb 2018 07:28) (132/83 - 132/83)  BP(mean): --  RR: 16 (15 Feb 2018 07:28) (16 - 16)  SpO2: 99% (15 Feb 2018 07:28) (99% - 99%)

## 2018-02-15 NOTE — PROGRESS NOTE BEHAVIORAL HEALTH - NSBHFUPINTERVALHXFT_PSY_A_CORE
patient was highly irritable last night  Angry with roommate who was making noise and not keeping room clean and said he wanted to put a pillow over his head.  Room was changed.    MEDICATIONS  (STANDING):  ferrous    sulfate 325 milliGRAM(s) Oral daily  OLANZapine 10 milliGRAM(s) Oral two times a day    MEDICATIONS  (PRN):  OLANZapine 5 milliGRAM(s) Oral two times a day PRN agitation/psychosis  OLANZapine Injectable 10 milliGRAM(s) IntraMuscular every 6 hours PRN agitation

## 2018-02-15 NOTE — PROGRESS NOTE BEHAVIORAL HEALTH - NSBHFUPINTERVALCCFT_PSY_A_CORE
Patient asking if he should go to [facility] to have his teeth replaced.  Remembered dental floor from time he was there for AAA repair

## 2018-02-16 RX ORDER — IBUPROFEN 200 MG
600 TABLET ORAL ONCE
Qty: 0 | Refills: 0 | Status: COMPLETED | OUTPATIENT
Start: 2018-02-16 | End: 2018-02-16

## 2018-02-16 RX ADMIN — OLANZAPINE 10 MILLIGRAM(S): 15 TABLET, FILM COATED ORAL at 09:12

## 2018-02-16 RX ADMIN — Medication 325 MILLIGRAM(S): at 09:12

## 2018-02-16 RX ADMIN — OLANZAPINE 10 MILLIGRAM(S): 15 TABLET, FILM COATED ORAL at 21:18

## 2018-02-16 NOTE — PROGRESS NOTE BEHAVIORAL HEALTH - NSBHCHARTREVIEWVS_PSY_A_CORE FT
Vital Signs Last 24 Hrs  T(C): 35.9 (16 Feb 2018 07:18), Max: 35.9 (16 Feb 2018 07:18)  T(F): 96.7 (16 Feb 2018 07:18), Max: 96.7 (16 Feb 2018 07:18)  HR: 85 (16 Feb 2018 07:18) (85 - 85)  BP: 120/64 (16 Feb 2018 07:18) (120/64 - 120/64)  BP(mean): --  RR: 16 (16 Feb 2018 07:18) (16 - 16)  SpO2: 100% (16 Feb 2018 07:18) (100% - 100%)

## 2018-02-16 NOTE — PROGRESS NOTE BEHAVIORAL HEALTH - NSBHFUPINTERVALHXFT_PSY_A_CORE
Patient is pleasant at intervals but easily becomes frustrated when he cannot leave.  Limited interactions with others.    MEDICATIONS  (STANDING):  ferrous    sulfate 325 milliGRAM(s) Oral daily  OLANZapine 10 milliGRAM(s) Oral two times a day    MEDICATIONS  (PRN):  OLANZapine 5 milliGRAM(s) Oral two times a day PRN agitation/psychosis  OLANZapine Injectable 10 milliGRAM(s) IntraMuscular every 6 hours PRN agitation

## 2018-02-17 RX ADMIN — OLANZAPINE 10 MILLIGRAM(S): 15 TABLET, FILM COATED ORAL at 09:14

## 2018-02-17 RX ADMIN — Medication 325 MILLIGRAM(S): at 09:13

## 2018-02-17 RX ADMIN — OLANZAPINE 10 MILLIGRAM(S): 15 TABLET, FILM COATED ORAL at 21:03

## 2018-02-18 RX ADMIN — Medication 325 MILLIGRAM(S): at 09:48

## 2018-02-18 RX ADMIN — OLANZAPINE 10 MILLIGRAM(S): 15 TABLET, FILM COATED ORAL at 09:48

## 2018-02-18 RX ADMIN — OLANZAPINE 10 MILLIGRAM(S): 15 TABLET, FILM COATED ORAL at 20:35

## 2018-02-19 RX ADMIN — OLANZAPINE 10 MILLIGRAM(S): 15 TABLET, FILM COATED ORAL at 08:45

## 2018-02-19 RX ADMIN — OLANZAPINE 10 MILLIGRAM(S): 15 TABLET, FILM COATED ORAL at 21:27

## 2018-02-19 RX ADMIN — Medication 325 MILLIGRAM(S): at 08:45

## 2018-02-20 RX ORDER — OLANZAPINE 15 MG/1
15 TABLET, FILM COATED ORAL
Qty: 0 | Refills: 0 | Status: DISCONTINUED | OUTPATIENT
Start: 2018-02-20 | End: 2018-03-02

## 2018-02-20 RX ADMIN — Medication 325 MILLIGRAM(S): at 09:37

## 2018-02-20 RX ADMIN — OLANZAPINE 15 MILLIGRAM(S): 15 TABLET, FILM COATED ORAL at 21:42

## 2018-02-20 RX ADMIN — OLANZAPINE 10 MILLIGRAM(S): 15 TABLET, FILM COATED ORAL at 09:37

## 2018-02-20 NOTE — PROGRESS NOTE BEHAVIORAL HEALTH - NSBHCHARTREVIEWVS_PSY_A_CORE FT
Vital Signs Last 24 Hrs  T(C): 36.1 (20 Feb 2018 07:18), Max: 36.1 (20 Feb 2018 07:18)  T(F): 97 (20 Feb 2018 07:18), Max: 97 (20 Feb 2018 07:18)  HR: 98 (20 Feb 2018 07:18) (98 - 98)  BP: 121/79 (20 Feb 2018 07:18) (121/79 - 121/79)  BP(mean): --  RR: 16 (20 Feb 2018 07:18) (16 - 16)  SpO2: 99% (20 Feb 2018 07:18) (99% - 99%)

## 2018-02-20 NOTE — PROGRESS NOTE BEHAVIORAL HEALTH - NSBHFUPINTERVALHXFT_PSY_A_CORE
Patient is overall in better control- less agitated but he remains delusional such as believing he has medical training  Will increase zyprema dose  MEDICATIONS  (STANDING):  ferrous    sulfate 325 milliGRAM(s) Oral daily  OLANZapine 10 milliGRAM(s) Oral two times a day

## 2018-02-21 RX ADMIN — OLANZAPINE 15 MILLIGRAM(S): 15 TABLET, FILM COATED ORAL at 21:10

## 2018-02-21 RX ADMIN — Medication 325 MILLIGRAM(S): at 08:51

## 2018-02-21 RX ADMIN — OLANZAPINE 15 MILLIGRAM(S): 15 TABLET, FILM COATED ORAL at 08:50

## 2018-02-21 NOTE — PROGRESS NOTE BEHAVIORAL HEALTH - NSBHFUPINTERVALHXFT_PSY_A_CORE
Patent in much better mood not hostile albeit delusional    MEDICATIONS  (STANDING):  ferrous    sulfate 325 milliGRAM(s) Oral daily  OLANZapine 15 milliGRAM(s) Oral two times a day    MEDICATIONS  (PRN):  OLANZapine 5 milliGRAM(s) Oral two times a day PRN agitation/psychosis  OLANZapine Injectable 10 milliGRAM(s) IntraMuscular every 6 hours PRN agitation

## 2018-02-21 NOTE — CONSULT NOTE ADULT - ATTENDING COMMENTS
Agree with podiatry resident assessment and plan, consent in chart,  thank you for the consult, please reconsult as needed.

## 2018-02-21 NOTE — PROGRESS NOTE BEHAVIORAL HEALTH - NSBHCHARTREVIEWVS_PSY_A_CORE FT
Vital Signs Last 24 Hrs  T(C): 36.3 (21 Feb 2018 08:13), Max: 36.3 (21 Feb 2018 08:13)  T(F): 97.4 (21 Feb 2018 08:13), Max: 97.4 (21 Feb 2018 08:13)  HR: 92 (21 Feb 2018 08:13) (92 - 92)  BP: 97/70 (21 Feb 2018 08:13) (97/70 - 97/70)  BP(mean): --  RR: 16 (21 Feb 2018 08:13) (16 - 16)  SpO2: 100% (21 Feb 2018 08:13) (100% - 100%)

## 2018-02-21 NOTE — CONSULT NOTE ADULT - ASSESSMENT
61 yo M with PMH of Paranoid schizophrenia, recent Sx Ectoscopics AAA repair BIB [facility] from [facility] for agitation, hostile and aggressive behavior is seen and evaluated for:    1. Painful callusesx6 in B/L feet  2. Pes planus B/L   3. HAV B/L  4. R 3rd Hammer toe     P:  Pt is seen and evaluated; plan d/w Dr. Williamson  Discussed with pt the etiology, and treatment plans including debridement of calluses; pt agrees to the procedure and signed the consent  Prepped calluses with alcohol swab   Debridement of B/L calluses x6 with #15 blade; pt tolerated the procedure well with no complaint   Educated patient upon the importance of regular foot care including debridement of calluses by podiatrist  Podiatry will follow while in house 63 yo M with PMH of Paranoid schizophrenia, recent Sx Ectoscopics AAA repair BIB [facility] from [facility] for agitation, hostile and aggressive behavior is seen and evaluated for:    1. Painful callusesx6 in B/L feet  2. Pes planus B/L   3. HAV B/L  4. R 3rd Hammer toe     P:  Pt is seen and evaluated;   Discussed with pt the etiology, and treatment plans including debridement of calluses; pt agrees to the procedure and signed the consent  Prepped calluses with alcohol swab   Debridement of B/L calluses x6 with #15 blade; pt tolerated the procedure well with no complaint   Educated patient upon the importance of regular foot care including debridement of calluses by podiatrist  Podiatry will follow while in house

## 2018-02-21 NOTE — CONSULT NOTE ADULT - SUBJECTIVE AND OBJECTIVE BOX
HPI: 63 yo M with PMH of Paranoid schizophrenia, recent Sx Ectoscopics AAA repair BIB [facility] from [facility] for agitation, hostile and aggressive behavior is seen and evaluated for b/l painful calluses in feet. Pt was admitted on  01/30/18 for Paranoid schizophrenia. Pt states he has been having painful calluses his whole life and the pt has been shaving the calluses himself and the last time he did it was 2 months ago. Pt states the pain improves significantly after the calluses removed. Pt denies any other foot complaints. Pt denies fever, chills, nausea, vomiting, SOB, chest pain, abd pain.       REVIEW OF SYSTEMS: all other review of systems are negative except as mentioned in HPI    PAST MEDICAL & SURGICAL HISTORY:  Paranoid schizophrenia  History of right knee surgery  History of left knee surgery  AAA (abdominal aortic aneurysm): Endoscopic repair  H/O hernia repair    SOCIAL HISTORY: smoke tobacco, drink beer once per couple weeks, no drug use.     FAMILY HISTORY:  No pertinent family history in first degree relatives    Allergies    cinnamon (Unknown)  Haldol (Unknown)  Mayonnaise (Unknown)  penicillins (Unknown)  radishes (Unknown)    Intolerances    MEDICATIONS  (STANDING):  ferrous    sulfate 325 milliGRAM(s) Oral daily  OLANZapine 15 milliGRAM(s) Oral two times a day    MEDICATIONS  (PRN):  OLANZapine 5 milliGRAM(s) Oral two times a day PRN agitation/psychosis  OLANZapine Injectable 10 milliGRAM(s) IntraMuscular every 6 hours PRN agitation      VITALS:  Vital Signs Last 24 Hrs  T(C): 36.3 (02-21-18 @ 08:13), Max: 36.3 (02-21-18 @ 08:13)  T(F): 97.4 (02-21-18 @ 08:13), Max: 97.4 (02-21-18 @ 08:13)  HR: 92 (02-21-18 @ 08:13) (92 - 92)  BP: 97/70 (02-21-18 @ 08:13) (97/70 - 97/70)  BP(mean): --  RR: 16 (02-21-18 @ 08:13) (16 - 16)  SpO2: 100% (02-21-18 @ 08:13) (100% - 100%)      PHYSICAL EXAM:  Constitutional: alert, awake, NAD, comfortable  LLE:  Derm:   Right foot: hyperkeratotic lesions noted at submet 1, 5 and at plantar distal 3rd digit. No open lesion, no fluctuance, no erythema, no edema, no malodor, no clinical signs of infection. No IDM, nails are cut to hygenic length.   Left foot: hyperkeratotic lesions noted at submet 1, 5 and at plantar medial hallux. No open lesion, no fluctuance, no erythema, no edema, no malodor, no clinical signs of infection. No IDM, nails are cut to hygenic length.   Vasc:  Pedal pulses DP and PT are palpable 2/4 B/L. CFT immediate in all digits x 10. Pedal hair presents on the R and absent on the L.   Neuro: light touch and protective sensation intact B/L.  Ortho: pain upon palpation of all the calluses. LE compartments are soft and nontender. Negative Bello's and Elsy's signs B/L. LE muscle strength are 5/5 B/L.   Pes planus b/l. Hallux abductovalgus B/L. R 3rd hammer toe.         LABS 1/30/18: (no new labs)  WBC: 9.4  H/H: 10.3/33.3  Platelet: 431    Comprehensive Metabolic Panel (01.30.18 @ 11:22)    Sodium, Serum: 136 mmol/L    Potassium, Serum: 4.2 mmol/L    Chloride, Serum: 104 mmol/L    Carbon Dioxide, Serum: 25 mmol/L    Anion Gap, Serum: 7 mmol/L    Blood Urea Nitrogen, Serum: 20 mg/dL    Creatinine, Serum: 0.85 mg/dL    Glucose, Serum: 90 mg/dL    Calcium, Total Serum: 9.1 mg/dL    Protein Total, Serum: 8.4 gm/dL    Albumin, Serum: 3.5 g/dL    Bilirubin Total, Serum: 0.5 mg/dL    Alkaline Phosphatase, Serum: 143 U/L    Aspartate Aminotransferase (AST/SGOT): 21 U/L    Alanine Aminotransferase (ALT/SGPT): 18 U/L     Hemoglobin A1C, Whole Blood (01.30.18 @ 17:10)    Hemoglobin A1C, Whole Blood: 5.2: Method: Immunoassay       Reference Range                4.0-5.6%       High risk (prediabetic)        5.7-6.4%       Diabetic, diagnostic             >=6.5%       ADA diabetic treatment goal       <7.0%  The Hemoglobin A1c testing is NGSP-certified.Reference ranges are based  upon the 2010 recommendations of  the American Diabetes Association.  Interpretation may vary for children  and adolescents. %        RADIOLOGY:  < from: CT Head No Cont (01.30.18 @ 13:45) >    EXAM:  CT BRAIN                            PROCEDURE DATE:  01/30/2018          INTERPRETATION:      CT head without IV contrast        CLINICAL INFORMATION:        Aggressive behavior    TECHNIQUE: Contiguous axial 5 mm sections were obtained through the head.   Sagittal and coronal 2-D reformatted images were also obtained.   This   scan was performed using automatic exposure control (radiation dose   reduction software) to obtain a diagnostic image quality scan with   patient dose as low as reasonably achievable.     FINDINGS:   No previous examinations are available for review.    The brain demonstrates no abnormal attenuation.   No acute cerebral   cortical infarct is seen.  No intracranial hemorrhage is found.  No mass   effect is found in the brain.      The ventricles, sulci and basal cisterns appear unremarkable.         The orbits are unremarkable.  The paranasal sinuses are clear.  The nasal   cavity appears intact.  The nasopharynx is symmetric.  The central skull   base, petrous temporal bones and calvarium remain intact.      IMPRESSION:   Unremarkable head CT.

## 2018-02-21 NOTE — PROGRESS NOTE BEHAVIORAL HEALTH - NSBHFUPINTERVALCCFT_PSY_A_CORE
Patient reports feeling good except for his feet.  Discussed raise of medication and need for podiatry consult

## 2018-02-22 RX ADMIN — OLANZAPINE 15 MILLIGRAM(S): 15 TABLET, FILM COATED ORAL at 08:38

## 2018-02-22 RX ADMIN — Medication 325 MILLIGRAM(S): at 08:35

## 2018-02-22 RX ADMIN — OLANZAPINE 15 MILLIGRAM(S): 15 TABLET, FILM COATED ORAL at 21:19

## 2018-02-22 NOTE — PROGRESS NOTE BEHAVIORAL HEALTH - NSBHFUPINTERVALHXFT_PSY_A_CORE
patient remains more pleasant and cooperative  Has been attending some groups  Currently in agreement with plan for discharge     MEDICATIONS  (STANDING):  ferrous    sulfate 325 milliGRAM(s) Oral daily  OLANZapine 15 milliGRAM(s) Oral two times a day    MEDICATIONS  (PRN):  OLANZapine 5 milliGRAM(s) Oral two times a day PRN agitation/psychosis  OLANZapine Injectable 10 milliGRAM(s) IntraMuscular every 6 hours PRN agitation

## 2018-02-22 NOTE — PROGRESS NOTE BEHAVIORAL HEALTH - NSBHCHARTREVIEWVS_PSY_A_CORE FT
Vital Signs Last 24 Hrs  T(C): 35.6 (22 Feb 2018 08:33), Max: 35.6 (22 Feb 2018 08:33)  T(F): 96.1 (22 Feb 2018 08:33), Max: 96.1 (22 Feb 2018 08:33)  HR: 98 (22 Feb 2018 08:33) (98 - 98)  BP: 110/67 (22 Feb 2018 08:33) (110/67 - 110/67)  BP(mean): --  RR: 14 (22 Feb 2018 08:33) (14 - 14)  SpO2: 99% (22 Feb 2018 08:33) (99% - 99%)

## 2018-02-23 RX ADMIN — OLANZAPINE 15 MILLIGRAM(S): 15 TABLET, FILM COATED ORAL at 21:48

## 2018-02-23 RX ADMIN — Medication 325 MILLIGRAM(S): at 10:00

## 2018-02-23 RX ADMIN — OLANZAPINE 15 MILLIGRAM(S): 15 TABLET, FILM COATED ORAL at 10:01

## 2018-02-23 NOTE — PROGRESS NOTE BEHAVIORAL HEALTH - NSBHCHARTREVIEWVS_PSY_A_CORE FT
Vital Signs Last 24 Hrs  T(C): 36.5 (23 Feb 2018 08:16), Max: 36.5 (23 Feb 2018 08:16)  T(F): 97.7 (23 Feb 2018 08:16), Max: 97.7 (23 Feb 2018 08:16)  HR: 92 (23 Feb 2018 08:16) (92 - 92)  BP: 122/69 (23 Feb 2018 08:16) (122/69 - 122/69)  BP(mean): --  RR: 14 (23 Feb 2018 08:16) (14 - 14)  SpO2: 100% (23 Feb 2018 08:16) (100% - 100%)

## 2018-02-24 RX ADMIN — OLANZAPINE 15 MILLIGRAM(S): 15 TABLET, FILM COATED ORAL at 21:45

## 2018-02-24 RX ADMIN — OLANZAPINE 15 MILLIGRAM(S): 15 TABLET, FILM COATED ORAL at 09:14

## 2018-02-24 RX ADMIN — Medication 325 MILLIGRAM(S): at 09:13

## 2018-02-25 RX ADMIN — OLANZAPINE 15 MILLIGRAM(S): 15 TABLET, FILM COATED ORAL at 21:40

## 2018-02-25 RX ADMIN — OLANZAPINE 15 MILLIGRAM(S): 15 TABLET, FILM COATED ORAL at 09:18

## 2018-02-25 RX ADMIN — Medication 325 MILLIGRAM(S): at 09:17

## 2018-02-26 RX ADMIN — Medication 325 MILLIGRAM(S): at 09:00

## 2018-02-26 RX ADMIN — OLANZAPINE 15 MILLIGRAM(S): 15 TABLET, FILM COATED ORAL at 09:03

## 2018-02-26 RX ADMIN — OLANZAPINE 15 MILLIGRAM(S): 15 TABLET, FILM COATED ORAL at 21:22

## 2018-02-26 NOTE — PROGRESS NOTE BEHAVIORAL HEALTH - NSBHFUPINTERVALHXFT_PSY_A_CORE
Patient is maintaining previous gains  Continues to have delusions but is cooperative and in control  MEDICATIONS  (STANDING):  ferrous    sulfate 325 milliGRAM(s) Oral daily  OLANZapine 15 milliGRAM(s) Oral two times a day    MEDICATIONS  (PRN):  OLANZapine 5 milliGRAM(s) Oral two times a day PRN agitation/psychosis  OLANZapine Injectable 10 milliGRAM(s) IntraMuscular every 6 hours PRN agitation

## 2018-02-26 NOTE — PROGRESS NOTE BEHAVIORAL HEALTH - NSBHCHARTREVIEWVS_PSY_A_CORE FT
Vital Signs Last 24 Hrs  T(C): 36.1 (02-26-18 @ 07:13), Max: 36.1 (02-26-18 @ 07:13)  T(F): 97 (02-26-18 @ 07:13), Max: 97 (02-26-18 @ 07:13)  HR: 86 (02-26-18 @ 07:13) (86 - 86)  BP: 129/67 (02-26-18 @ 07:13) (129/67 - 129/67)  BP(mean): --  RR: 16 (02-26-18 @ 07:13) (16 - 16)  SpO2: 100% (02-26-18 @ 07:13) (100% - 100%)

## 2018-02-26 NOTE — PROGRESS NOTE BEHAVIORAL HEALTH - NSBHFUPINTERVALCCFT_PSY_A_CORE
patient has some distress related to his roommate but otherwise without compalints  looking forward to leaving later this week.

## 2018-02-27 RX ADMIN — Medication 325 MILLIGRAM(S): at 09:50

## 2018-02-27 RX ADMIN — OLANZAPINE 15 MILLIGRAM(S): 15 TABLET, FILM COATED ORAL at 20:45

## 2018-02-27 RX ADMIN — OLANZAPINE 15 MILLIGRAM(S): 15 TABLET, FILM COATED ORAL at 09:50

## 2018-02-27 NOTE — PROGRESS NOTE BEHAVIORAL HEALTH - NSBHCHARTREVIEWVS_PSY_A_CORE FT
Vital Signs Last 24 Hrs  T(C): 36.3 (27 Feb 2018 08:44), Max: 36.3 (27 Feb 2018 08:44)  T(F): 97.3 (27 Feb 2018 08:44), Max: 97.3 (27 Feb 2018 08:44)  HR: 102 (27 Feb 2018 08:44) (102 - 102)  BP: 128/70 (27 Feb 2018 08:44) (128/70 - 128/70)  BP(mean): --  RR: 14 (27 Feb 2018 08:44) (14 - 14)  SpO2: 100% (27 Feb 2018 08:44) (100% - 100%)

## 2018-02-27 NOTE — PROGRESS NOTE BEHAVIORAL HEALTH - NSBHFUPINTERVALHXFT_PSY_A_CORE
Remains somewhat hyperverbal and hypersexual but does not appear manic.  Residual symptoms likely due to hx of TBI.  Looking forward to discharge later this week.    MEDICATIONS  (STANDING):  ferrous    sulfate 325 milliGRAM(s) Oral daily  OLANZapine 15 milliGRAM(s) Oral two times a day

## 2018-02-28 RX ORDER — FERROUS SULFATE 325(65) MG
1 TABLET ORAL
Qty: 30 | Refills: 0 | OUTPATIENT
Start: 2018-02-28 | End: 2018-03-29

## 2018-02-28 RX ORDER — OLANZAPINE 15 MG/1
1 TABLET, FILM COATED ORAL
Qty: 30 | Refills: 1 | OUTPATIENT
Start: 2018-02-28 | End: 2018-03-29

## 2018-02-28 RX ADMIN — Medication 325 MILLIGRAM(S): at 09:17

## 2018-02-28 RX ADMIN — OLANZAPINE 15 MILLIGRAM(S): 15 TABLET, FILM COATED ORAL at 09:18

## 2018-02-28 RX ADMIN — OLANZAPINE 15 MILLIGRAM(S): 15 TABLET, FILM COATED ORAL at 20:51

## 2018-02-28 NOTE — DISCHARGE NOTE BEHAVIORAL HEALTH - FAMILY HISTORY OF PSYCHIATRIC ILLNESS
· Description  homeless, , Reports he has a daughter who lives in [place]. · Description  homeless.  Reports he has a daughter who lives in [place].

## 2018-02-28 NOTE — PROGRESS NOTE BEHAVIORAL HEALTH - NSBHCHARTREVIEWVS_PSY_A_CORE FT
MEDICATIONS  (STANDING):  ferrous    sulfate 325 milliGRAM(s) Oral daily  OLANZapine 15 milliGRAM(s) Oral two times a day    MEDICATIONS  (PRN):  OLANZapine 5 milliGRAM(s) Oral two times a day PRN agitation/psychosis  OLANZapine Injectable 10 milliGRAM(s) IntraMuscular every 6 hours PRN agitation

## 2018-02-28 NOTE — DISCHARGE NOTE BEHAVIORAL HEALTH - NSBHDCPURPOSE1FT_PSY_A_CORE
Pt declined aftercare referral, but this is a resource for continuing treatment if you change his mind. Pt declined aftercare referral, but this is a resource for continuing   treatment if he changes his mind.

## 2018-02-28 NOTE — DISCHARGE NOTE BEHAVIORAL HEALTH - NSBHDCTESTSFT_PSY_A_CORE
Hemoglobin A1C, Whole Blood (01.30.18 @ 17:10)    Hemoglobin A1C, Whole Blood: 5.2: Method: Immunoassay       Reference Range                4.0-5.6%       High risk (prediabetic)        5.7-6.4%       Diabetic, diagnostic             >=6.5%       ADA diabetic treatment goal       <7.0%  The Hemoglobin A1c testing is NGSP-certified.Reference ranges are based  upon the 2010 recommendations of  the American Diabetes Association.  Interpretation may vary for children  and adolescents. % Hemoglobin A1C, Whole Blood (01.30.18 @ 17:10)    Hemoglobin A1C, Whole Blood: 5.2: Method: Immunoassay       Reference Range                4.0-5.6%       High risk (prediabetic)        5.7-6.4%       Diabetic, diagnostic             >=6.5%       ADA diabetic treatment goal       <7.0%  The Hemoglobin A1c testing is NGSP-certified.Reference ranges are based  upon the 2010 recommendations of  the American Diabetes Association.  Interpretation may vary for children  and adolescents. %    Lipid Profile in AM (03.01.18 @ 07:45)    Total Cholesterol/HDL Ratio Measurement: 4.0 RATIO    Cholesterol, Serum: 207 mg/dL    Triglycerides, Serum: 94 mg/dL    HDL Cholesterol, Serum: 52 mg/dL    Direct LDL: 136: LDL Cholesterol --- Interpretive Comment (for adults 18 and over)  Optimal LDL Level may vary based on clinical situation  Below 70                  Ideal for people at very high risk of heart  disease  Below 100                Ideal for people at risk of heart disease  100 - 129                   [place]  130 - 159                   Borderline high  160 - 189                   High  190 and Above          Very high mg/dL

## 2018-02-28 NOTE — DISCHARGE NOTE BEHAVIORAL HEALTH - NSTOBACCOREFERRAL_GEN_A_CS
Pt declined referral for smoking cessation at time of admission and at time of discharge./Patient declined information

## 2018-02-28 NOTE — DISCHARGE NOTE BEHAVIORAL HEALTH - NSBHDCCRISISPROB3FT_PSY_A_CORE
I have questions about my discharge plan or I change my meind and decide I would like to continue in outpatient treatment, call [facility] , Ana Zamorano, [place] at [phone]

## 2018-02-28 NOTE — DISCHARGE NOTE BEHAVIORAL HEALTH - NSBHDCHOUSING_PSY_A_CORE
Pt is arranging his own housing and plans to go the [facility] located at [address] [place] [address]  ([phone])/other facility...

## 2018-02-28 NOTE — DISCHARGE NOTE BEHAVIORAL HEALTH - NSBHDCCRISISPLAN2FT_PSY_A_CORE
Call Dr. Cervantes at [facility] at [phone]  Outreach to one of the above  clinics  go to the nearest hospital emergency room

## 2018-02-28 NOTE — PROGRESS NOTE BEHAVIORAL HEALTH - NSBHFUPINTERVALHXFT_PSY_A_CORE
Patient reported to be hypersexual and inappropriate in the evening however during the day is in control and cooperative with treatment   Does not believe he needs psychiatric follow up he leaves

## 2018-02-28 NOTE — DISCHARGE NOTE BEHAVIORAL HEALTH - REASON FOR ADMISSION
· Reason For Referral  agitation, threatening behavior  · Patient's Chief Complaint  "They are all hell, you are harassing me.  F--k- You, get your ass out of here!"

## 2018-02-28 NOTE — DISCHARGE NOTE BEHAVIORAL HEALTH - CONDITIONS AT DISCHARGE
Patient alert, oriented x 3, pleasant and cooperative.  Pt denies suicidal/homicidal ideation, intent and plan.  Discharge plan reviewed with patient who has refused an aftercare appointment at this time.  Referrals provided on discharge paperwork as well as social workers number to call if assistance is needed as an outpatient.  Discharge paperwork given to patient.  Pt given cab fare to the train station.  Pt reported having a train ticket to get to [facility].  Patient left with appropriate clothing, shoes and coat.

## 2018-02-28 NOTE — DISCHARGE NOTE BEHAVIORAL HEALTH - NSBHDCSWCOMMENTSFT_PSY_A_CORE
Pt educated about the signs and symptoms of mental illness, need and resources for continuing in outpatient treatment and community supports for maintaining wellness. Pt educated to continue taking the above medication as prescribed until advised otherwise by your outpatient provider. Pt also advised that although he has declined referral/appt for outpatient care, [facility]  remains available to him even after discharge to assist him with making an appointment.

## 2018-02-28 NOTE — DISCHARGE NOTE BEHAVIORAL HEALTH - NSBHDCPURPOSE2FT_PSY_A_CORE
Pt declining referral for continuing outpatient treatment but this is an additional resource for outpatient treatment if he changes his mind.

## 2018-02-28 NOTE — DISCHARGE NOTE BEHAVIORAL HEALTH - NSBHDCMEDICALFT_PSY_A_CORE
ferrous    sulfate 325 milliGRAM(s) Oral daily for low iron  recent AAA repair  aw podiatry for foot care and he was able to walk better after he was treated

## 2018-02-28 NOTE — DISCHARGE NOTE BEHAVIORAL HEALTH - NSBHDCVIOLFCTRMIT_PSY_A_CORE
Patent refused referral for emergency housing and plans to not take medication upon discharge  Does not meet criteria for AOT at this time and has no local supports to enlist the aid of

## 2018-02-28 NOTE — DISCHARGE NOTE BEHAVIORAL HEALTH - NSBHDCDXVALIDYESFT_PSY_A_CORE
patient was started on risperidone which was tirated to 6 mg but he remained angry irritable and at times threatening, highly paranoid and delusional.  medciation was changed to Zyprexa which was titrated to 30 mg  patient continued to have delusions that he had studied and medicine and law and at times he was hypersexual; however, he became much more pleasan and not threatening for the last 2 weeks of his stay.  Refused referral to emergency housing and [facility]  patient continued to refuse outpatient referrals as he did not believe he had a mental illness.  Delusions are likely fixed as a result of head trauma he reportedly suffered patient was started on risperidone which was titrated to 6 mg but he remained angry irritable and at times threatening, highly paranoid and delusional.  medication was changed to Zyprexa which was titrated to 30 mg  patient continued to have delusions that he had studied and medicine and law and at times he was hypersexual; however, he became much more pleasant and not threatening for the last 2 weeks of his stay.  Refused referral to emergency housing [facility]  patient continued to refuse outpatient referrals as he did not believe he had a mental illness.  Delusions are likely fixed as a result of head trauma he reportedly suffered years earlier.

## 2018-02-28 NOTE — DISCHARGE NOTE BEHAVIORAL HEALTH - MEDICATION SUMMARY - MEDICATIONS TO TAKE
I will START or STAY ON the medications listed below when I get home from the hospital:    OLANZapine 15 mg oral tablet  -- 1 tab(s) by mouth 2 times a day until told to discontinue by MD  -- Indication: For Paranoia    ferrous sulfate 325 mg (65 mg elemental iron) oral tablet  -- 1 tab(s) by mouth once a day until told to discontinue by MD  -- Indication: For iron deficiency

## 2018-02-28 NOTE — DISCHARGE NOTE BEHAVIORAL HEALTH - NSBHDCADMRISKMITFT_PSY_A_CORE
patient remains without insight despite attempts at psychoeducation.  He plans to stop medications upon discharge  He was offered referral to emergency housing but refused

## 2018-02-28 NOTE — PROGRESS NOTE BEHAVIORAL HEALTH - NSBHFUPINTERVALCCFT_PSY_A_CORE
Patient maintains he is going straight to [facility] dental clinic and then to [facility].

## 2018-02-28 NOTE — DISCHARGE NOTE BEHAVIORAL HEALTH - PAST PSYCHIATRIC HISTORY
Per reports,  Pt has remote psychiatric history, not compliant with treatment.  Hx of head trauma and reported schizophrenia

## 2018-02-28 NOTE — DISCHARGE NOTE BEHAVIORAL HEALTH - NSBHDCCRISISPLAN1FT_PSY_A_CORE
talk to a trusted friend or family member and ask for help  call the suicide hotline  call 911 or go to the nearest hospital emergency room

## 2018-03-01 LAB
CHOLEST SERPL-MCNC: 207 MG/DL — HIGH (ref 10–199)
HDLC SERPL-MCNC: 52 MG/DL — SIGNIFICANT CHANGE UP (ref 40–125)
LIPID PNL WITH DIRECT LDL SERPL: 136 MG/DL — HIGH
TOTAL CHOLESTEROL/HDL RATIO MEASUREMENT: 4 RATIO — SIGNIFICANT CHANGE UP (ref 3.4–9.6)
TRIGL SERPL-MCNC: 94 MG/DL — SIGNIFICANT CHANGE UP (ref 10–149)

## 2018-03-01 RX ADMIN — OLANZAPINE 15 MILLIGRAM(S): 15 TABLET, FILM COATED ORAL at 09:11

## 2018-03-01 RX ADMIN — Medication 325 MILLIGRAM(S): at 09:11

## 2018-03-01 RX ADMIN — OLANZAPINE 15 MILLIGRAM(S): 15 TABLET, FILM COATED ORAL at 20:43

## 2018-03-01 NOTE — PROGRESS NOTE BEHAVIORAL HEALTH - NSBHFUPINTERVALHXFT_PSY_A_CORE
Patient has maintained previous gains.  Has been calm and in control with residual delusions      MEDICATIONS  (STANDING):  ferrous    sulfate 325 milliGRAM(s) Oral daily  OLANZapine 15 milliGRAM(s) Oral two times a day    MEDICATIONS  (PRN):  OLANZapine 5 milliGRAM(s) Oral two times a day PRN agitation/psychosis  OLANZapine Injectable 10 milliGRAM(s) IntraMuscular every 6 hours PRN agitation

## 2018-03-01 NOTE — PROGRESS NOTE BEHAVIORAL HEALTH - NSBHCHARTREVIEWLAB_PSY_A_CORE FT
lipid Panel Pending
Hemoglobin A1C, Whole Blood: 5.2: Method: Immunoassay       Reference Range                4.0-5.6%       High risk (prediabetic)        5.7-6.4%       Diabetic, diagnostic             >=6.5%       ADA diabetic treatment goal       <7.0%  The Hemoglobin A1c testing is NGSP-certified.Reference ranges are based  upon the 2010 recommendations of  the American Diabetes Association.  Interpretation may vary for children  and adolescents. % (01.30.18 @ 17:10)

## 2018-03-01 NOTE — PROGRESS NOTE BEHAVIORAL HEALTH - NSBHCHARTREVIEWVS_PSY_A_CORE FT
Vital Signs Last 24 Hrs  T(C): 36.1 (01 Mar 2018 07:12), Max: 36.1 (01 Mar 2018 07:12)  T(F): 97 (01 Mar 2018 07:12), Max: 97 (01 Mar 2018 07:12)  HR: 95 (01 Mar 2018 07:12) (95 - 95)  BP: 125/69 (01 Mar 2018 07:12) (125/69 - 125/69)  BP(mean): --  RR: 16 (01 Mar 2018 07:12) (16 - 16)  SpO2: 99% (01 Mar 2018 07:12) (99% - 99%)

## 2018-03-02 VITALS
SYSTOLIC BLOOD PRESSURE: 133 MMHG | TEMPERATURE: 97 F | HEART RATE: 97 BPM | RESPIRATION RATE: 16 BRPM | OXYGEN SATURATION: 100 % | DIASTOLIC BLOOD PRESSURE: 63 MMHG

## 2018-03-02 RX ADMIN — OLANZAPINE 15 MILLIGRAM(S): 15 TABLET, FILM COATED ORAL at 08:53

## 2018-03-02 RX ADMIN — Medication 325 MILLIGRAM(S): at 08:53

## 2018-03-02 NOTE — PROGRESS NOTE BEHAVIORAL HEALTH - REMOTE MEMORY
Other
Impaired
Impaired
Other
Other
Impaired
Other
Impaired
Other
Impaired
Other
Impaired

## 2018-03-02 NOTE — PROGRESS NOTE BEHAVIORAL HEALTH - NS ED [facility] MSE GENERAL APPEARANCE
Well developed/No deformities present
No deformities present
No deformities present/Well developed
Well developed/No deformities present
No deformities present/Well developed
No deformities present/Well developed
No deformities present
Well developed/No deformities present
No deformities present
Well developed/No deformities present
Well developed/No deformities present
No deformities present/Well developed
No deformities present/Well developed
No deformities present
No deformities present/Well developed
No deformities present
No deformities present/Well developed

## 2018-03-02 NOTE — PROGRESS NOTE BEHAVIORAL HEALTH - RELATEDNESS
Fair
Fair
Poor
Fair
Poor
Poor
Fair
Fair
Poor
Fair
Poor
Poor
Fair
Fair
Poor
Fair
Poor

## 2018-03-02 NOTE — PROGRESS NOTE BEHAVIORAL HEALTH - NSBHFUPMEDSE_PSY_A_CORE
None known
Yes
None known

## 2018-03-02 NOTE — PROGRESS NOTE BEHAVIORAL HEALTH - NSBHFUPVIOL_PSY_A_CORE
none known

## 2018-03-02 NOTE — PROGRESS NOTE BEHAVIORAL HEALTH - NSBHPTASSESSDT_PSY_A_CORE
01-Feb-2018 13:26
01-Mar-2018 10:01
02-Feb-2018 15:27
02-Mar-2018 09:22
04-Feb-2018
06-Feb-2018 07:58
12-Feb-2018 12:06
15-Feb-2018 13:09
20-Feb-2018 10:25
23-Feb-2018 11:39
26-Feb-2018 11:46
27-Feb-2018 09:36
28-Feb-2018 11:16
31-Jan-2018 14:13
09-Feb-2018 12:05
22-Feb-2018 14:07
21-Feb-2018 15:49
03-Feb-2018
06-Feb-2018 09:39
07-Feb-2018 13:12
08-Feb-2018 11:30
16-Feb-2018 11:06
13-Feb-2018 10:56
14-Feb-2018 14:16
05-Feb-2018 13:20

## 2018-03-02 NOTE — PROGRESS NOTE BEHAVIORAL HEALTH - GROOMING
Poor
Fair
Poor
Poor
Fair
Fair
Poor
Fair
Fair
Poor

## 2018-03-02 NOTE — PROGRESS NOTE BEHAVIORAL HEALTH - GAIT / STATION
Normal gait / station
Other
Normal gait / station
Other
Normal gait / station
Other
Normal gait / station
Other

## 2018-03-02 NOTE — PROGRESS NOTE BEHAVIORAL HEALTH - NSBHLEGALSTATUS_PSY_A_CORE
9.39 (Emergency)

## 2018-03-02 NOTE — PROGRESS NOTE BEHAVIORAL HEALTH - NS ED [facility] MED ROS PSYCHIATRIC
See HPI

## 2018-03-02 NOTE — PROGRESS NOTE BEHAVIORAL HEALTH - PROBLEM SELECTOR PLAN 1
continue risperidone titration  taking medications with slight improvement  Increase dose to 6 mg daily
continue risperidone titration  taking medications with slight improvement  Increase dose to 6 mg daily    No change noted, may need to change to alternative agent    Zyprexa at 20 mg has calmed patent down but remains delusional  Will increase to 30 mg.
continue risperidone titration
continue risperidone titration  taking medications with slight improvement  Increase dose to 6 mg daily    No change noted, may need to change to alternative agent
continue risperidone titration  taking medications with slight improvement
continue risperidone titration  taking medications with slight improvement  Increase dose to 6 mg daily    No change noted, may need to change to alternative agent
Zyprexa at 30 mg has calmed patent down but remains delusional
Zyprexa at 20 mg has calmed patent down but remains delusional  Will increase to 30 mg.
continue risperidone titration  taking medications with slight improvement
continue risperidone titration  taking medications with slight improvement  Increase dose to 6 mg daily
continue risperidone titration  taking medications with slight improvement  Increase dose to 6 mg daily    No change noted, may need to change to alternative agent
continue risperidone titration  taking medications with slight improvement  Increase dose to 6 mg daily    No change noted, may need to change to alternative agent
continue risperidone titration  taking medications with slight improvement
continue risperidone titration  taking medications with slight improvement
continue risperidone titration  taking medications with slight improvement  Increase dose to 6 mg daily    No change noted, may need to change to alternative agent    Zyprexa at 20 mg has calmed patent down but remains delusional  Will increase to 30 mg.
continue risperidone titration  taking medications with slight improvement  Increase dose to 6 mg daily    No change noted, may need to change to alternative agent
continue risperidone titration  taking medications with slight improvement  Increase dose to 6 mg daily    No change noted, may need to change to alternative agent    Zyprexa at 20 mg has calmed patent down but remains delusional  Will increase to 30 mg.
continue risperidone titration  taking medications with slight improvement  Increase dose to 6 mg daily    No change noted, may need to change to alternative agent    Zyprexa at 20 mg has calmed patent down but remains delusional  Will increase to 30 mg.
Zyprexa at 30 mg has calmed patent down but remains delusional
continue risperidone titration  taking medications with slight improvement  Increase dose to 6 mg daily    No change noted, may need to change to alternative agent    Zyprexa at 20 mg has calmed patent down but remains delusional  Will increase to 30 mg.
Zyprexa at 30 mg has calmed patent down but remains delusional
continue risperidone titration
start risperidone titration- patient currently refusing

## 2018-03-02 NOTE — PROGRESS NOTE BEHAVIORAL HEALTH - NSBHFUPTYPE_PSY_A_CORE
Inpatient
Inpatient-On Service Note
Inpatient

## 2018-03-02 NOTE — PROGRESS NOTE BEHAVIORAL HEALTH - NSBHADMITIPREASON_PSY_A_CORE
Danger to others; mental illness expected to respond to inpatient care

## 2018-03-02 NOTE — PROGRESS NOTE BEHAVIORAL HEALTH - BEHAVIOR
Cooperative
Hostile
Cooperative/Hostile
Cooperative
Hostile
Hostile/Uncooperative
Cooperative
Cooperative
Uncooperative/Hostile
Cooperative
Uncooperative/Hostile
Uncooperative/Hostile
Cooperative
Cooperative
Hostile
Uncooperative/Hostile
Cooperative
Hostile/Uncooperative
Hostile

## 2018-03-02 NOTE — PROGRESS NOTE BEHAVIORAL HEALTH - RISK ASSESSMENT
· Risk Assessment  mod potential risk to self and others due to impaired judgement, aggression, provacative and threatening behavior and irritated dysregulated mood.  Pt has limited protective factors, is homeless, and  lack of support systems and is noncompliant with treatement.  No known h/o self harm and has been able to avoid need for psych admission for a long time.
· Risk Assessment  mod potential risk to self and others due to impaired judgement, Pt has limited protective factors, is homeless, and  lack of support systems and is noncompliant with treatement.  No known h/o self harm and has been able to avoid need for psych admission for a long time.    He is no longer agitated or threatening
· Risk Assessment  mod potential risk to self and others due to impaired judgement, aggression, provacative and threatening behavior and irritated dysregulated mood.  Pt has limited protective factors, is homeless, and  lack of support systems and is noncompliant with treatement.  No known h/o self harm and has been able to avoid need for psych admission for a long time.
· Risk Assessment  mod potential risk to self and others due to impaired judgement, Pt has limited protective factors, is homeless, and  lack of support systems and is noncompliant with treatement.  No known h/o self harm and has been able to avoid need for psych admission for a long time.
· Risk Assessment  mod potential risk to self and others due to impaired judgement, aggression, provacative and threatening behavior and irritated dysregulated mood.  Pt has limited protective factors, is homeless, and  lack of support systems and is noncompliant with treatement.  No known h/o self harm and has been able to avoid need for psych admission for a long time.
· Risk Assessment  mod potential risk to self and others due to impaired judgement, Pt has limited protective factors, is homeless, and  lack of support systems and is noncompliant with treatement.  No known h/o self harm and has been able to avoid need for psych admission for a long time.
· Risk Assessment  mod potential risk to self and others due to impaired judgement, aggression, provacative and threatening behavior and irritated dysregulated mood.  Pt has limited protective factors, is homeless, and  lack of support systems and is noncompliant with treatement.  No known h/o self harm and has been able to avoid need for psych admission for a long time.

## 2018-03-02 NOTE — PROGRESS NOTE BEHAVIORAL HEALTH - PERCEPTIONS
No abnormalities
No abnormalities
Other
Illusions/Other
No abnormalities/Other
No abnormalities
Other
No abnormalities
Other
No abnormalities
No abnormalities/Other
No abnormalities
Other

## 2018-03-02 NOTE — PROGRESS NOTE BEHAVIORAL HEALTH - PROBLEM SELECTOR PROBLEM 1
Paranoid schizophrenia, chronic condition with acute exacerbation

## 2018-03-02 NOTE — PROGRESS NOTE BEHAVIORAL HEALTH - SUMMARY
· Summary  62 yoAAm, homeless living at [facility], [facility] Paranoid Schizophrenia by history, remote past psych admissions per recent record, noncompliant with treatment, unknown substance abuse status, uncooperative with interview, agitated, aggressive hostile, threatening and paranoid.  Pt guarded throughout interview which he could not tolerate.  Pt has presents altered from previous eval, 2 weeks ago.  Due to   aggression, menacing and  threatening behavior,  with paranoia, would recommend in pt  psych admission, as Pt is a  potential danger to self and others.
· Summary  62 yoAAm, homeless living at [facility], [facility] Paranoid Schizophrenia by history, remote past psych admissions per recent record, noncompliant with treatment, unknown substance abuse status, uncooperative with interview, agitated, aggressive hostile, threatening and paranoid.  Pt guarded throughout interview which he could not tolerate.  Pt has presents altered from previous eval, 2 weeks ago.  Due to   aggression, menacing and  threatening behavior,  with paranoia, would recommend in pt  psych admission, as Pt is a  potential danger to self and others.
· Summary  62 yoAAm, homeless living at [facility], [facility] Paranoid Schizophrenia by history, remote past psych admissions per recent record, noncompliant with treatment, unknown substance abuse status, uncooperative with interview, agitated, aggressive hostile, threatening and paranoid.  Pt guarded throughout interview which he could not tolerate.  Pt has presents altered from previous eval, 2 weeks ago.  Due to   aggression, menacing and  threatening behavior,  with paranoia, would recommend in pt  psych admission, as Pt is a  potential danger to self and others.
· Summary  62 yoAAm, homeless living at [facility], [facility] Paranoid Schizophrenia by history, remote past psych admissions per recent record, noncompliant with treatment, unknown substance abuse status, uncooperative with interview, agitated, aggressive hostile, threatening and paranoid.  Pt guarded throughout interview which he could not tolerate.  Pt has presents altered from previous eval, 2 weeks ago.  Due to   aggression, menacing and  threatening behavior,  with paranoia, would recommend in pt  psych admission, as Pt is a  potential danger to self and others.
· Summary  62 yoAAm, homeless living at [facility], [facility] Paranoid Schizophrenia by history, remote past psych admissions per recent record, noncompliant with treatment, unknown substance abuse status, uncooperative with interview, agitated, aggressive hostile, threatening and paranoid.  Pt guarded throughout interview which he could not tolerate.  Pt has presents altered from previous eval, 2 weeks ago.  Due to   aggression, menacing and  threatening behavior,  with paranoia, would recommend in pt  psych admission, as Pt is a  potential danger to self and others.
· Summary  62 yoAAm, homeless living at [facility], [facility] Paranoid Schizophrenia by history, remote past psych admissions per recent record, noncompliant with treatment, unknown substance abuse status, uncooperative with interview, agitated, aggressive hostile, threatening and paranoid.  Pt guarded throughout interview which he could not tolerate.  Pt has presents altered from previous eval, 2 weeks ago.  Due to   aggression, menacing and  threatening behavior,  with paranoia, would recommend in pt  psych admission, as Pt is a  potential danger to self and others.
62 yoAAm, homeless living at [facility], [facility] Paranoid Schizophrenia by history, remote past psych admissions per recent record, noncompliant with treatment, unknown substance abuse status, uncooperative with interview, agitated, aggressive hostile, threatening and paranoid.  Pt guarded throughout interview which he could not tolerate.  Pt has presents altered from previous eval, 2 weeks ago.  Due to   aggression, menacing and  threatening behavior,  with paranoia, would recommend in pt  psych admission, as Pt is a  potential danger to self and others.
· Summary  62 yoAAm, homeless living at [facility], [facility] Paranoid Schizophrenia by history, remote past psych admissions per recent record, noncompliant with treatment, unknown substance abuse status, uncooperative with interview, agitated, aggressive hostile, threatening and paranoid.  Pt guarded throughout interview which he could not tolerate.  Pt has presents altered from previous eval, 2 weeks ago.  Due to   aggression, menacing and  threatening behavior,  with paranoia, would recommend in pt  psych admission, as Pt is a  potential danger to self and others.
· Summary  62 yoAAm, homeless living at [facility], [facility] Paranoid Schizophrenia by history, remote past psych admissions per recent record, noncompliant with treatment, unknown substance abuse status, uncooperative with interview, agitated, aggressive hostile, threatening and paranoid.  Pt guarded throughout interview which he could not tolerate.  Pt has presents altered from previous eval, 2 weeks ago.  Due to   aggression, menacing and  threatening behavior,  with paranoia, would recommend in pt  psych admission, as Pt is a  potential danger to self and others.
· Summary  62 yoAAm, homeless living at [facility], [facility] Paranoid Schizophrenia by history, remote past psych admissions per recent record, noncompliant with treatment, unknown substance abuse status, uncooperative with interview, agitated, aggressive hostile, threatening and paranoid.  Pt guarded throughout interview which he could not tolerate.  Pt has presents altered from previous eval, 2 weeks ago.  Due to   aggression, menacing and  threatening behavior,  with paranoia, would recommend in pt  psych admission, as Pt is a  potential danger to self and others.
· Summary  62 yoAAm, homeless living at [facility], [facility] Paranoid Schizophrenia by history, remote past psych admissions per recent record, noncompliant with treatment, unknown substance abuse status, uncooperative with interview, agitated, aggressive hostile, threatening and paranoid.  Pt guarded throughout interview which he could not tolerate.  Pt has presents altered from previous eval, 2 weeks ago.  Due to   aggression, menacing and  threatening behavior,  with paranoia, would recommend in pt  psych admission, as Pt is a  potential danger to self and others.
· Summary  62 yoAAm, homeless living at [facility], [facility] Paranoid Schizophrenia by history, remote past psych admissions per recent record, noncompliant with treatment, unknown substance abuse status, uncooperative with interview, agitated, aggressive hostile, threatening and paranoid.  Pt guarded throughout interview which he could not tolerate.  Pt has presents altered from previous eval, 2 weeks ago.  Due to   aggression, menacing and  threatening behavior,  with paranoia, would recommend in pt  psych admission, as Pt is a  potential danger to self and others.
· Summary  62 yoAAm, homeless living at [facility], [facility] Paranoid Schizophrenia by history, remote past psych admissions per recent record, noncompliant with treatment, unknown substance abuse status, uncooperative with interview, agitated, aggressive hostile, threatening and paranoid.  Pt guarded throughout interview which he could not tolerate.  Pt has presents altered from previous eval, 2 weeks ago.  Due to   aggression, menacing and  threatening behavior,  with paranoia, would recommend in pt  psych admission, as Pt is a  potential danger to self and others.
62 yoAAm, homeless living at [facility], [facility] Paranoid Schizophrenia by history, remote past psych admissions per recent record, noncompliant with treatment, unknown substance abuse status, uncooperative with interview, agitated, aggressive hostile, threatening and paranoid.  Pt guarded throughout interview which he could not tolerate.  Pt has presents altered from previous eval, 2 weeks ago.  Due to   aggression, menacing and  threatening behavior,  with paranoia, would recommend in pt  psych admission, as Pt is a  potential danger to self and others.
· Summary  62 yoAAm, homeless living at [facility], [facility] Paranoid Schizophrenia by history, remote past psych admissions per recent record, noncompliant with treatment, unknown substance abuse status, uncooperative with interview, agitated, aggressive hostile, threatening and paranoid.  Pt guarded throughout interview which he could not tolerate.  Pt has presents altered from previous eval, 2 weeks ago.  Due to   aggression, menacing and  threatening behavior,  with paranoia, would recommend in pt  psych admission, as Pt is a  potential danger to self and others.
· Summary  62 yoAAm, homeless living at [facility], [facility] Paranoid Schizophrenia by history, remote past psych admissions per recent record, noncompliant with treatment, unknown substance abuse status, uncooperative with interview, agitated, aggressive hostile, threatening and paranoid.  Pt guarded throughout interview which he could not tolerate.  Pt has presents altered from previous eval, 2 weeks ago.  Due to   aggression, menacing and  threatening behavior,  with paranoia, would recommend in pt  psych admission, as Pt is a  potential danger to self and others.
62 yoAAm, homeless living at [facility], [facility] Paranoid Schizophrenia by history, remote past psych admissions per recent record, noncompliant with treatment, unknown substance abuse status, uncooperative with interview, agitated, aggressive hostile, threatening and paranoid.  Pt guarded throughout interview which he could not tolerate.  Pt has presents altered from previous eval, 2 weeks ago.  Due to   aggression, menacing and  threatening behavior,  with paranoia, would recommend in pt  psych admission, as Pt is a  potential danger to self and others.
· Summary  62 yoAAm, homeless living at [facility], [facility] Paranoid Schizophrenia by history, remote past psych admissions per recent record, noncompliant with treatment, unknown substance abuse status, uncooperative with interview, agitated, aggressive hostile, threatening and paranoid.  Pt guarded throughout interview which he could not tolerate.  Pt has presents altered from previous eval, 2 weeks ago.  Due to   aggression, menacing and  threatening behavior,  with paranoia, would recommend in pt  psych admission, as Pt is a  potential danger to self and others.
62 yoAAm, homeless living at [facility], [facility] Paranoid Schizophrenia by history, remote past psych admissions per recent record, noncompliant with treatment, unknown substance abuse status, uncooperative with interview, agitated, aggressive hostile, threatening and paranoid.  Pt guarded throughout interview which he could not tolerate.  Pt has presents altered from previous eval, 2 weeks ago.  Due to   aggression, menacing and  threatening behavior,  with paranoia, would recommend in pt  psych admission, as Pt is a  potential danger to self and others.
· Summary  62 yoAAm, homeless living at [facility], [facility] Paranoid Schizophrenia by history, remote past psych admissions per recent record, noncompliant with treatment, unknown substance abuse status, uncooperative with interview, agitated, aggressive hostile, threatening and paranoid.  Pt guarded throughout interview which he could not tolerate.  Pt has presents altered from previous eval, 2 weeks ago.  Due to   aggression, menacing and  threatening behavior,  with paranoia, would recommend in pt  psych admission, as Pt is a  potential danger to self and others.
· Summary  62 yoAAm, homeless living at [facility], [facility] Paranoid Schizophrenia by history, remote past psych admissions per recent record, noncompliant with treatment, unknown substance abuse status, uncooperative with interview, agitated, aggressive hostile, threatening and paranoid.  Pt guarded throughout interview which he could not tolerate.  Pt has presents altered from previous eval, 2 weeks ago.  Due to   aggression, menacing and  threatening behavior,  with paranoia, would recommend in pt  psych admission, as Pt is a  potential danger to self and others.
· Summary  62 yoAAm, homeless living at [facility], [facility] Paranoid Schizophrenia by history, remote past psych admissions per recent record, noncompliant with treatment, unknown substance abuse status, uncooperative with interview, agitated, aggressive hostile, threatening and paranoid.  Pt guarded throughout interview which he could not tolerate.  Pt has presents altered from previous eval, 2 weeks ago.  Due to   aggression, menacing and  threatening behavior,  with paranoia, would recommend in pt  psych admission, as Pt is a  potential danger to self and others.
· Summary  62 yoAAm, homeless living at [facility], [facility] Paranoid Schizophrenia by history, remote past psych admissions per recent record, noncompliant with treatment, unknown substance abuse status, uncooperative with interview, agitated, aggressive hostile, threatening and paranoid.  Pt guarded throughout interview which he could not tolerate.  Pt has presents altered from previous eval, 2 weeks ago.  Due to   aggression, menacing and  threatening behavior,  with paranoia, would recommend in pt  psych admission, as Pt is a  potential danger to self and others.
62 yoAAm, homeless living at [facility], [facility] Paranoid Schizophrenia by history, remote past psych admissions per recent record, noncompliant with treatment, unknown substance abuse status, uncooperative with interview, agitated, aggressive hostile, threatening and paranoid.  Pt guarded throughout interview which he could not tolerate.  Pt has presents altered from previous eval, 2 weeks ago.  Due to   aggression, menacing and  threatening behavior,  with paranoia, would recommend in pt  psych admission, as Pt is a  potential danger to self and others.
· Summary  62 yoAAm, homeless living at [facility], [facility] Paranoid Schizophrenia by history, remote past psych admissions per recent record, noncompliant with treatment, unknown substance abuse status, uncooperative with interview, agitated, aggressive hostile, threatening and paranoid.  Pt guarded throughout interview which he could not tolerate.  Pt has presents altered from previous eval, 2 weeks ago.  Due to   aggression, menacing and  threatening behavior,  with paranoia, would recommend in pt  psych admission, as Pt is a  potential danger to self and others.

## 2018-03-02 NOTE — PROGRESS NOTE BEHAVIORAL HEALTH - ESTIMATED DISCHARGE DATE
14-Feb-2018
22-Feb-2018
02-Mar-2018
02-Mar-2018
14-Feb-2018
14-Feb-2018
22-Feb-2018
22-Feb-2018
14-Feb-2018
14-Feb-2018
22-Feb-2018
22-Feb-2018
02-Mar-2018
14-Feb-2018
22-Feb-2018
02-Mar-2018
22-Feb-2018
02-Mar-2018
14-Feb-2018

## 2018-03-02 NOTE — PROGRESS NOTE BEHAVIORAL HEALTH - INSIGHT (REGARDING PSYCHIATRIC ILLNESS)
Poor

## 2018-03-02 NOTE — PROGRESS NOTE BEHAVIORAL HEALTH - RECENT MEMORY
Impaired

## 2018-03-02 NOTE — PROGRESS NOTE BEHAVIORAL HEALTH - NSBHFUPINTERVALHXFT_PSY_A_CORE
Patient is improved  No longer hostile or threatening Cooperative and much less easily angered.  Has fixed delusions about having advanced training  but appears to be be at his baseline

## 2018-03-02 NOTE — PROGRESS NOTE BEHAVIORAL HEALTH - AFFECT CONGRUENCE
Congruent

## 2018-03-02 NOTE — PROGRESS NOTE BEHAVIORAL HEALTH - ESTIMATED INTELLIGENCE
Below Average

## 2018-03-02 NOTE — PROGRESS NOTE BEHAVIORAL HEALTH - NS ED BHA MSE SPEECH SPONTANEITY
Increased latency
Normal
Normal
Increased latency
Normal
Increased latency
Normal
Increased latency
Increased latency
Normal
Increased latency

## 2018-03-02 NOTE — PROGRESS NOTE BEHAVIORAL HEALTH - NSBHADMITIPDSM_PSY_A_CORE
see above for Axis I, II, III

## 2018-03-02 NOTE — PROGRESS NOTE BEHAVIORAL HEALTH - NSBHADMITDANGEROTHERS_PSY_A_CORE
high risk for assault

## 2018-03-02 NOTE — PROGRESS NOTE BEHAVIORAL HEALTH - NSBHCHARTREVIEWVS_PSY_A_CORE FT
Vital Signs Last 24 Hrs  T(C): 35.9 (02 Mar 2018 07:17), Max: 35.9 (02 Mar 2018 07:17)  T(F): 96.6 (02 Mar 2018 07:17), Max: 96.6 (02 Mar 2018 07:17)  HR: 97 (02 Mar 2018 07:17) (97 - 97)  BP: 133/63 (02 Mar 2018 07:17) (133/63 - 133/63)  BP(mean): --  RR: 16 (02 Mar 2018 07:17) (16 - 16)  SpO2: 100% (02 Mar 2018 07:17) (100% - 100%)

## 2018-03-02 NOTE — PROGRESS NOTE BEHAVIORAL HEALTH - NS ED BHA REVIEW OF ED CHART VITAL SIGNS REVIEWED
Yes
None available
Yes

## 2018-03-02 NOTE — PROGRESS NOTE BEHAVIORAL HEALTH - THOUGHT PROCESS
Illogical/Impaired reasoning/Disorganized/Tangential
Illogical/Impaired reasoning
Disorganized/Thought blocking/Illogical/Impaired reasoning
Disorganized/Illogical/Thought blocking/Impaired reasoning
Disorganized/Impaired reasoning/Illogical
Thought blocking/Disorganized/Illogical/Impaired reasoning
Disorganized/Illogical/Impaired reasoning
Illogical/Impaired reasoning/Disorganized
Disorganized/Impaired reasoning/Illogical
Disorganized/Tangential/Illogical/Impaired reasoning
Impaired reasoning/Disorganized/Illogical
Disorganized/Illogical/Impaired reasoning
Disorganized/Thought blocking/Illogical/Impaired reasoning
Illogical/Impaired reasoning/Disorganized/Tangential
Impaired reasoning/Illogical
Impaired reasoning/Illogical
Illogical/Impaired reasoning
Impaired reasoning
Thought blocking/Disorganized/Illogical/Impaired reasoning
Disorganized/Illogical/Impaired reasoning
Illogical/Impaired reasoning
Illogical/Impaired reasoning
Impaired reasoning
Disorganized/Impaired reasoning/Illogical
Impaired reasoning/Disorganized/Illogical/Thought blocking

## 2018-03-02 NOTE — PROGRESS NOTE BEHAVIORAL HEALTH - NS ED BHA REVIEW OF ED CHART AVAILABLE LABS REVIEWED
None available
Yes
None available
Yes
Yes
None available

## 2018-03-02 NOTE — PROGRESS NOTE BEHAVIORAL HEALTH - IMPULSE CONTROL
Impaired
Normal
Impaired
Normal
Impaired
Normal
Normal
Impaired
Normal
Impaired

## 2018-03-02 NOTE — PROGRESS NOTE BEHAVIORAL HEALTH - MOOD
Irritable
Irritable
Angry/Irritable
Irritable
Angry/Irritable
Irritable/Angry
Irritable
Normal
Angry/Irritable
Irritable
Normal
Angry/Irritable
Irritable
Normal
Normal
Angry/Irritable
Normal
Irritable/Angry
Irritable

## 2018-03-02 NOTE — PROGRESS NOTE BEHAVIORAL HEALTH - NSBHCONSORIP_PSY_A_CORE
Inpatient Admission...

## 2018-03-02 NOTE — PROGRESS NOTE BEHAVIORAL HEALTH - NSBHADMITIPOBS_PSY_A_CORE
Routine observation
Enhanced supervision
Routine observation
Enhanced supervision
Routine observation

## 2018-03-02 NOTE — PROGRESS NOTE BEHAVIORAL HEALTH - BODY HABITUS
Well nourished/Underweight
Underweight/Well nourished
Underweight
Underweight/Well nourished
Underweight/Well nourished
Well nourished/Underweight
Underweight/Well nourished
Underweight/Well nourished
Underweight
Underweight/Well nourished
Underweight
Well nourished/Underweight
Well nourished/Underweight
Underweight/Well nourished
Well nourished/Underweight
Underweight
Underweight/Well nourished
Underweight
Well nourished/Underweight

## 2018-03-02 NOTE — PROGRESS NOTE BEHAVIORAL HEALTH - JUDGMENT (REGARDING EVERYDAY EVENTS)
Fair
Poor
Poor
Fair
Poor
Fair
Fair
Poor
Fair
Poor
Poor
Fair
Poor
Fair

## 2018-03-02 NOTE — PROGRESS NOTE BEHAVIORAL HEALTH - AFFECT QUALITY
Elevated
Irritable
Elevated
Euthymic
Irritable
Irritable
Elevated
Elevated
Irritable
Irritable
Elevated
Irritable
Elevated
Irritable
Irritable

## 2018-03-02 NOTE — PROGRESS NOTE BEHAVIORAL HEALTH - AFFECT RANGE
Full
Constricted
Labile
Constricted
Full
Labile
Constricted
Full
Constricted

## 2018-03-02 NOTE — PROGRESS NOTE BEHAVIORAL HEALTH - NSBHADMITIPOBSFT_PSY_A_CORE
not currently acting out
has not been physically aggressive but this Am was threatening
not currently acting out
has not been physically aggressive but this Am was threatening
not currently acting out
has not been physically aggressive but this Am was threatening
no longer aggressive
has not been physically aggressive but this Am was threatening
not currently acting out
not currently acting out
has not been physically aggressive but this Am was threatening
has not been physically aggressive but this Am was threatening
not currently acting out
not currently acting out
has not been physically aggressive but this Am was threatening
no longer aggressive
not currently acting out

## 2018-03-02 NOTE — PROGRESS NOTE BEHAVIORAL HEALTH - LANGUAGE
No abnormalities noted

## 2018-03-02 NOTE — PROGRESS NOTE BEHAVIORAL HEALTH - NS ED BHA MSE SPEECH ARTICULATION
Normal

## 2018-03-02 NOTE — PROGRESS NOTE BEHAVIORAL HEALTH - NSBHLOC_PSY_A_CORE
Alert

## 2018-03-02 NOTE — PROGRESS NOTE BEHAVIORAL HEALTH - EYE CONTACT
Fair
Fair/Poor
Fair
Fair
Fair/Poor
Fair
Fair/Poor
Fair/Poor
Fair
Fair/Poor
Fair
Fair/Poor
Fair

## 2018-03-02 NOTE — PROGRESS NOTE BEHAVIORAL HEALTH - NS ED BHA MSE SPEECH RATE
Slowed
Normal
Normal
Slowed
Normal
Slowed
Normal
Normal
Slowed
Normal
Slowed

## 2018-03-02 NOTE — PROGRESS NOTE BEHAVIORAL HEALTH - THOUGHT CONTENT
Delusions/Ideas of reference
Delusions
Delusions/Ideas of reference
Delusions
Delusions/Ideas of reference
Delusions/Other
Ideas of reference/Delusions
Delusions
Delusions/Other
Delusions
Delusions
Ideas of reference/Delusions
Delusions
Delusions/Other
Ideas of reference/Delusions
Delusions

## 2018-03-02 NOTE — PROGRESS NOTE BEHAVIORAL HEALTH - NS ED BHA MED ROS CARDIOVASCULAR
No complaints

## 2018-03-02 NOTE — PROGRESS NOTE BEHAVIORAL HEALTH - NS ED BHA MSE SPEECH VOLUME
Soft
Normal
Loud
Soft
Normal
Loud
Normal
Soft
Normal
Normal
Soft
Normal
Normal
Loud
Normal
Normal
Soft

## 2018-03-02 NOTE — PROGRESS NOTE BEHAVIORAL HEALTH - NSBHATTESTSEENBY_PSY_A_CORE
attending Psychiatrist without NP/Trainee

## 2018-03-02 NOTE — PROGRESS NOTE BEHAVIORAL HEALTH - AXIS III
endoscopic AAA Jan 2018

## 2018-03-02 NOTE — PROGRESS NOTE BEHAVIORAL HEALTH - NS ED BHA AXIS I PRIMARY CODE FT
F20.0

## 2018-03-02 NOTE — PROGRESS NOTE BEHAVIORAL HEALTH - MUSCLE TONE / STRENGTH
Normal muscle tone/strength
Other
Normal muscle tone/strength
Other
Normal muscle tone/strength
Other
Normal muscle tone/strength
Other

## 2018-03-15 ENCOUNTER — EMERGENCY (EMERGENCY)
Facility: HOSPITAL | Age: 63
LOS: 1 days | Discharge: HOME | End: 2018-03-15
Attending: EMERGENCY MEDICINE | Admitting: EMERGENCY MEDICINE
Payer: MEDICARE

## 2018-03-15 VITALS
HEIGHT: 66 IN | OXYGEN SATURATION: 98 % | DIASTOLIC BLOOD PRESSURE: 88 MMHG | RESPIRATION RATE: 17 BRPM | HEART RATE: 72 BPM | WEIGHT: 160.06 LBS | TEMPERATURE: 98 F | SYSTOLIC BLOOD PRESSURE: 168 MMHG

## 2018-03-15 DIAGNOSIS — Z98.890 OTHER SPECIFIED POSTPROCEDURAL STATES: Chronic | ICD-10-CM

## 2018-03-15 DIAGNOSIS — I71.4 ABDOMINAL AORTIC ANEURYSM, WITHOUT RUPTURE: Chronic | ICD-10-CM

## 2018-03-15 PROCEDURE — 99285 EMERGENCY DEPT VISIT HI MDM: CPT | Mod: 25

## 2018-03-16 VITALS
RESPIRATION RATE: 16 BRPM | DIASTOLIC BLOOD PRESSURE: 73 MMHG | SYSTOLIC BLOOD PRESSURE: 117 MMHG | TEMPERATURE: 98 F | HEART RATE: 70 BPM | OXYGEN SATURATION: 99 %

## 2018-03-16 DIAGNOSIS — M21.41 FLAT FOOT [PES PLANUS] (ACQUIRED), RIGHT FOOT: ICD-10-CM

## 2018-03-16 DIAGNOSIS — Z91.018 ALLERGY TO OTHER FOODS: ICD-10-CM

## 2018-03-16 DIAGNOSIS — D47.3 ESSENTIAL (HEMORRHAGIC) THROMBOCYTHEMIA: ICD-10-CM

## 2018-03-16 DIAGNOSIS — Z59.0 HOMELESSNESS: ICD-10-CM

## 2018-03-16 DIAGNOSIS — R41.82 ALTERED MENTAL STATUS, UNSPECIFIED: ICD-10-CM

## 2018-03-16 DIAGNOSIS — M20.41 OTHER HAMMER TOE(S) (ACQUIRED), RIGHT FOOT: ICD-10-CM

## 2018-03-16 DIAGNOSIS — Z88.0 ALLERGY STATUS TO PENICILLIN: ICD-10-CM

## 2018-03-16 DIAGNOSIS — Z88.8 ALLERGY STATUS TO OTHER DRUGS, MEDICAMENTS AND BIOLOGICAL SUBSTANCES STATUS: ICD-10-CM

## 2018-03-16 DIAGNOSIS — M20.11 HALLUX VALGUS (ACQUIRED), RIGHT FOOT: ICD-10-CM

## 2018-03-16 DIAGNOSIS — F17.210 NICOTINE DEPENDENCE, CIGARETTES, UNCOMPLICATED: ICD-10-CM

## 2018-03-16 DIAGNOSIS — F20.0 PARANOID SCHIZOPHRENIA: ICD-10-CM

## 2018-03-16 DIAGNOSIS — D64.9 ANEMIA, UNSPECIFIED: ICD-10-CM

## 2018-03-16 DIAGNOSIS — M21.42 FLAT FOOT [PES PLANUS] (ACQUIRED), LEFT FOOT: ICD-10-CM

## 2018-03-16 DIAGNOSIS — L84 CORNS AND CALLOSITIES: ICD-10-CM

## 2018-03-16 DIAGNOSIS — Z87.820 PERSONAL HISTORY OF TRAUMATIC BRAIN INJURY: ICD-10-CM

## 2018-03-16 DIAGNOSIS — L85.8 OTHER SPECIFIED EPIDERMAL THICKENING: ICD-10-CM

## 2018-03-16 DIAGNOSIS — Z91.19 PATIENT'S NONCOMPLIANCE WITH OTHER MEDICAL TREATMENT AND REGIMEN: ICD-10-CM

## 2018-03-16 DIAGNOSIS — M20.12 HALLUX VALGUS (ACQUIRED), LEFT FOOT: ICD-10-CM

## 2018-03-16 DIAGNOSIS — I71.4 ABDOMINAL AORTIC ANEURYSM, WITHOUT RUPTURE: ICD-10-CM

## 2018-03-16 DIAGNOSIS — E86.0 DEHYDRATION: ICD-10-CM

## 2018-03-16 PROCEDURE — 70450 CT HEAD/BRAIN W/O DYE: CPT | Mod: 26

## 2018-03-16 RX ORDER — ACETAMINOPHEN 500 MG
650 TABLET ORAL ONCE
Qty: 0 | Refills: 0 | Status: COMPLETED | OUTPATIENT
Start: 2018-03-16 | End: 2018-03-16

## 2018-03-16 RX ORDER — ONDANSETRON 8 MG/1
4 TABLET, FILM COATED ORAL ONCE
Qty: 0 | Refills: 0 | Status: COMPLETED | OUTPATIENT
Start: 2018-03-16 | End: 2018-03-16

## 2018-03-16 RX ORDER — FAMOTIDINE 10 MG/ML
1 INJECTION INTRAVENOUS
Qty: 0 | Refills: 0 | COMMUNITY

## 2018-03-16 RX ADMIN — Medication 30 MILLILITER(S): at 01:28

## 2018-03-16 RX ADMIN — ONDANSETRON 4 MILLIGRAM(S): 8 TABLET, FILM COATED ORAL at 01:28

## 2018-03-16 RX ADMIN — Medication 650 MILLIGRAM(S): at 01:27

## 2018-03-16 SDOH — ECONOMIC STABILITY - HOUSING INSECURITY: HOMELESSNESS: Z59.0

## 2018-03-16 NOTE — ED PROVIDER NOTE - PSH
AAA (abdominal aortic aneurysm)  Endoscopic repair  H/O hernia repair    H/O knee surgery  bilateral  H/O left knee surgery  1980  H/O neck surgery  2007  H/O right knee surgery  1990  History of left knee surgery    History of open heart surgery    History of right knee surgery

## 2018-03-16 NOTE — ED ADULT NURSE NOTE - CHPI ED SYMPTOMS NEG
no vomiting/no dizziness/no tingling/no weakness/no decreased eating/drinking/no fever/no nausea/no chills

## 2018-03-16 NOTE — ED ADULT NURSE REASSESSMENT NOTE - NS ED NURSE REASSESS COMMENT FT1
Pt states that he is homeless and has no place to live. Wants to speak with social work in the am. MD Young notified. Pt states that he is homeless and has no place to live. Wants to speak with social work in the am. MD Young notified. Pt calm and cooperative at this time.

## 2018-03-16 NOTE — ED PROVIDER NOTE - OBJECTIVE STATEMENT
Pt. is a 61 yo M who is homeless living at [facility], hx of HTN, hyperlipidemia, AAA repair, paranoid schizophrenia discharged from  2-3 wks ago now presenting with headache, dizziness, and epigastric abdominal pain.  Pt. states he last drank "Mr. Gallego" a week ago.  Denies any other drug use except smoking cigarettes.  Denies fever, cough, vomiting or diarrhea.  +nausea.

## 2018-03-16 NOTE — ED ADULT NURSE REASSESSMENT NOTE - NS ED NURSE REASSESS COMMENT FT1
Attempted to perform BGM on patient 2 times, Pt refused both times. Pt calm. MD Young notified. Will continue to monitor.

## 2018-03-16 NOTE — ED PROVIDER NOTE - PROGRESS NOTE DETAILS
JG:  Pt. eating and drinking without any problems or complaints.  If glc is normal and EKG normal, pt. will be sent back to [facility]. Pt ate a full meal, is feeling better, wants to leave.  Refused fingerstick glc and EKG.  No chest pain or dizziness at this time.

## 2018-03-16 NOTE — ED PROVIDER NOTE - PMH
AAA (abdominal aortic aneurysm) without rupture    Abdominal aortic aneurysm without rupture  12/2015  Paranoid schizophrenia    Paranoid schizophrenia    Schizophrenia

## 2018-03-16 NOTE — ED ADULT NURSE REASSESSMENT NOTE - NS ED NURSE REASSESS COMMENT FT1
Pt waiting to speak with social work this am. Pt calm and cooperative at this time and throughout the night. Pt comfort and safety maintained. Will continue to monitor.

## 2018-03-16 NOTE — ED ADULT NURSE NOTE - OBJECTIVE STATEMENT
Pt reports to ED complaining of abdominal pain, headache and sore feet. States that he has been walking around all day. Pt states "I'm homeless, I have had nothing to eat or drink. Can I have a cup of tea and a sandwich?"

## 2018-03-16 NOTE — ED PROVIDER NOTE - ENMT, MLM
Airway patent, poor dentition, Nasal mucosa clear. Mouth with normal mucosa. Throat has no vesicles, no oropharyngeal exudates and uvula is midline.

## 2018-03-17 ENCOUNTER — EMERGENCY (EMERGENCY)
Facility: HOSPITAL | Age: 63
LOS: 0 days | Discharge: HOME | End: 2018-03-17
Attending: EMERGENCY MEDICINE | Admitting: EMERGENCY MEDICINE
Payer: MEDICARE

## 2018-03-17 VITALS
HEIGHT: 67 IN | OXYGEN SATURATION: 99 % | SYSTOLIC BLOOD PRESSURE: 162 MMHG | TEMPERATURE: 98 F | HEART RATE: 88 BPM | DIASTOLIC BLOOD PRESSURE: 76 MMHG | WEIGHT: 154.98 LBS | RESPIRATION RATE: 18 BRPM

## 2018-03-17 DIAGNOSIS — I71.4 ABDOMINAL AORTIC ANEURYSM, WITHOUT RUPTURE: Chronic | ICD-10-CM

## 2018-03-17 DIAGNOSIS — Z98.890 OTHER SPECIFIED POSTPROCEDURAL STATES: Chronic | ICD-10-CM

## 2018-03-17 DIAGNOSIS — Z79.899 OTHER LONG TERM (CURRENT) DRUG THERAPY: ICD-10-CM

## 2018-03-17 DIAGNOSIS — Z59.1 INADEQUATE HOUSING: ICD-10-CM

## 2018-03-17 DIAGNOSIS — F20.9 SCHIZOPHRENIA, UNSPECIFIED: ICD-10-CM

## 2018-03-17 PROCEDURE — 99283 EMERGENCY DEPT VISIT LOW MDM: CPT

## 2018-03-17 SDOH — ECONOMIC STABILITY - HOUSING INSECURITY: INADEQUATE HOUSING: Z59.1

## 2018-03-17 NOTE — ED ADULT TRIAGE NOTE - CHIEF COMPLAINT QUOTE
Pt presents to the ED for housing. Pt states, "I'm homeless and Im trying to find a place to live and I was trying to find a place to live yesterday."

## 2018-03-17 NOTE — ED ADULT NURSE NOTE - OBJECTIVE STATEMENT
Pt seen and evaluated by ER MD. Pt requesting housing but states he could not accept the housing offered to him earlier today because he had to go celebrate his daughters birthday. Pt with no medical complaints

## 2018-03-17 NOTE — ED PROVIDER NOTE - OBJECTIVE STATEMENT
pt states is here to "warm up" was give housing earlier in day admits to some marijuana use denies any si or hi no hallucinations. has no compleaint denies HA denies chest pain sob no abd pain no motor or sensory defifcits.

## 2018-03-17 NOTE — ED ADULT NURSE NOTE - CHPI ED SYMPTOMS NEG
no vomiting/no fever/no tingling/no nausea/no decreased eating/drinking/no pain/no weakness/no numbness/no chills/no dizziness

## 2018-03-19 DIAGNOSIS — E78.00 PURE HYPERCHOLESTEROLEMIA, UNSPECIFIED: ICD-10-CM

## 2018-03-19 DIAGNOSIS — F17.210 NICOTINE DEPENDENCE, CIGARETTES, UNCOMPLICATED: ICD-10-CM

## 2018-03-19 DIAGNOSIS — G44.219 EPISODIC TENSION-TYPE HEADACHE, NOT INTRACTABLE: ICD-10-CM

## 2018-03-19 DIAGNOSIS — Z79.82 LONG TERM (CURRENT) USE OF ASPIRIN: ICD-10-CM

## 2018-03-19 DIAGNOSIS — I10 ESSENTIAL (PRIMARY) HYPERTENSION: ICD-10-CM

## 2018-03-19 DIAGNOSIS — Z86.79 PERSONAL HISTORY OF OTHER DISEASES OF THE CIRCULATORY SYSTEM: ICD-10-CM

## 2018-03-19 DIAGNOSIS — Z59.0 HOMELESSNESS: ICD-10-CM

## 2018-03-19 DIAGNOSIS — R10.9 UNSPECIFIED ABDOMINAL PAIN: ICD-10-CM

## 2018-03-19 SDOH — ECONOMIC STABILITY - HOUSING INSECURITY: HOMELESSNESS: Z59.0

## 2018-03-20 ENCOUNTER — EMERGENCY (EMERGENCY)
Facility: HOSPITAL | Age: 63
LOS: 0 days | Discharge: HOME | End: 2018-03-20
Attending: EMERGENCY MEDICINE | Admitting: EMERGENCY MEDICINE
Payer: MEDICARE

## 2018-03-20 VITALS
TEMPERATURE: 98 F | HEART RATE: 75 BPM | OXYGEN SATURATION: 100 % | SYSTOLIC BLOOD PRESSURE: 135 MMHG | DIASTOLIC BLOOD PRESSURE: 82 MMHG | RESPIRATION RATE: 18 BRPM

## 2018-03-20 VITALS
RESPIRATION RATE: 17 BRPM | OXYGEN SATURATION: 100 % | DIASTOLIC BLOOD PRESSURE: 80 MMHG | SYSTOLIC BLOOD PRESSURE: 132 MMHG | WEIGHT: 169.98 LBS | TEMPERATURE: 98 F | HEART RATE: 77 BPM

## 2018-03-20 DIAGNOSIS — Z98.890 OTHER SPECIFIED POSTPROCEDURAL STATES: Chronic | ICD-10-CM

## 2018-03-20 DIAGNOSIS — M79.672 PAIN IN LEFT FOOT: ICD-10-CM

## 2018-03-20 DIAGNOSIS — Z59.0 HOMELESSNESS: ICD-10-CM

## 2018-03-20 DIAGNOSIS — I71.4 ABDOMINAL AORTIC ANEURYSM, WITHOUT RUPTURE: Chronic | ICD-10-CM

## 2018-03-20 PROCEDURE — 99283 EMERGENCY DEPT VISIT LOW MDM: CPT

## 2018-03-20 PROCEDURE — 73630 X-RAY EXAM OF FOOT: CPT | Mod: 26,LT

## 2018-03-20 SDOH — ECONOMIC STABILITY - HOUSING INSECURITY: HOMELESSNESS: Z59.0

## 2018-03-20 NOTE — ED PROVIDER NOTE - OBJECTIVE STATEMENT
63 y/o M w/ pmhx of AAA repair, HTN, paranoid schizophrenia, presents to ED c/o left foot pain. States he went to [facility], has note in pocket from shelter tell him to leave the shelter. Took train to [place] and called ambulance. C/o HA, abd pain and foot pain. Pt was in [facility] last night for same complaints. Hx of noncompliance with medications.

## 2018-03-20 NOTE — ED PROVIDER NOTE - MUSCULOSKELETAL, MLM
Spine appears normal, range of motion is not limited, no muscle or joint tenderness. left foot is normal, no TTP

## 2018-03-20 NOTE — ED ADULT NURSE NOTE - CHPI ED SYMPTOMS NEG
no tingling/no weakness/no decreased eating/drinking/no dizziness/no vomiting/no fever/no nausea/no chills/no numbness/no pain

## 2018-03-20 NOTE — ED ADULT NURSE NOTE - OBJECTIVE STATEMENT
pt complains of bilateral feet pain. pt is homeless. seen in ED multiple times this week for same symptoms. pt noncompliant with all medication regimes.

## 2018-03-20 NOTE — ED PROVIDER NOTE - PMH
AAA (abdominal aortic aneurysm) without rupture    Abdominal aortic aneurysm without rupture  12/2015  HTN (hypertension)    Paranoid schizophrenia    Paranoid schizophrenia    Schizophrenia

## 2018-03-20 NOTE — ED ADULT TRIAGE NOTE - CHIEF COMPLAINT QUOTE
pt presents to Ed due to complaints of BL feet pain was seen in  ED last night pt is currently homeless

## 2018-03-23 ENCOUNTER — EMERGENCY (EMERGENCY)
Facility: HOSPITAL | Age: 63
LOS: 0 days | Discharge: HOME | End: 2018-03-24
Attending: EMERGENCY MEDICINE | Admitting: EMERGENCY MEDICINE
Payer: MEDICARE

## 2018-03-23 VITALS
SYSTOLIC BLOOD PRESSURE: 120 MMHG | OXYGEN SATURATION: 99 % | HEIGHT: 67 IN | DIASTOLIC BLOOD PRESSURE: 68 MMHG | RESPIRATION RATE: 17 BRPM | HEART RATE: 79 BPM | WEIGHT: 154.98 LBS

## 2018-03-23 DIAGNOSIS — I71.4 ABDOMINAL AORTIC ANEURYSM, WITHOUT RUPTURE: Chronic | ICD-10-CM

## 2018-03-23 DIAGNOSIS — I71.4 ABDOMINAL AORTIC ANEURYSM, WITHOUT RUPTURE: ICD-10-CM

## 2018-03-23 DIAGNOSIS — Z59.0 HOMELESSNESS: ICD-10-CM

## 2018-03-23 DIAGNOSIS — R10.9 UNSPECIFIED ABDOMINAL PAIN: ICD-10-CM

## 2018-03-23 DIAGNOSIS — Z98.890 OTHER SPECIFIED POSTPROCEDURAL STATES: Chronic | ICD-10-CM

## 2018-03-23 DIAGNOSIS — I10 ESSENTIAL (PRIMARY) HYPERTENSION: ICD-10-CM

## 2018-03-23 PROCEDURE — 99285 EMERGENCY DEPT VISIT HI MDM: CPT | Mod: 25

## 2018-03-23 RX ORDER — SODIUM CHLORIDE 9 MG/ML
2000 INJECTION INTRAMUSCULAR; INTRAVENOUS; SUBCUTANEOUS ONCE
Qty: 0 | Refills: 0 | Status: COMPLETED | OUTPATIENT
Start: 2018-03-23 | End: 2018-03-23

## 2018-03-23 SDOH — ECONOMIC STABILITY - HOUSING INSECURITY: HOMELESSNESS: Z59.0

## 2018-03-23 NOTE — ED PROVIDER NOTE - MEDICAL DECISION MAKING DETAILS
Ramonita BRANDON: Hx of aortic aneurysm, ? repair, homeless, poor historian, with abdominal pain complaint. CT angio rule out dissection ordered. Signed out the care of the patient to Dr. Olmedo. Sign out and consequent care of the patient is appreciated.

## 2018-03-23 NOTE — ED PROVIDER NOTE - OBJECTIVE STATEMENT
62 year old male with PMH of abdominal aortic aneurysm, HTN, schizophrenia presents to the ED, stating that he is currently homeless and needs a place to stay and also complains of mild, diffuse abdominal pain. No diarrhea, no constipation. No blood in stool or urine. No trauma. No recent exotic travel. Otherwise patient is a poor historian.

## 2018-03-24 LAB
ALBUMIN SERPL ELPH-MCNC: 3.3 G/DL — SIGNIFICANT CHANGE UP (ref 3.3–5)
ALP SERPL-CCNC: 102 U/L — SIGNIFICANT CHANGE UP (ref 40–120)
ALT FLD-CCNC: 21 U/L — SIGNIFICANT CHANGE UP (ref 12–78)
ANION GAP SERPL CALC-SCNC: 6 MMOL/L — SIGNIFICANT CHANGE UP (ref 5–17)
ANISOCYTOSIS BLD QL: SLIGHT — SIGNIFICANT CHANGE UP
AST SERPL-CCNC: 25 U/L — SIGNIFICANT CHANGE UP (ref 15–37)
BASOPHILS # BLD AUTO: 0.05 K/UL — SIGNIFICANT CHANGE UP (ref 0–0.2)
BASOPHILS NFR BLD AUTO: 0.9 % — SIGNIFICANT CHANGE UP (ref 0–2)
BILIRUB SERPL-MCNC: 0.3 MG/DL — SIGNIFICANT CHANGE UP (ref 0.2–1.2)
BUN SERPL-MCNC: 27 MG/DL — HIGH (ref 7–23)
CALCIUM SERPL-MCNC: 8.7 MG/DL — SIGNIFICANT CHANGE UP (ref 8.5–10.1)
CHLORIDE SERPL-SCNC: 108 MMOL/L — SIGNIFICANT CHANGE UP (ref 96–108)
CO2 SERPL-SCNC: 26 MMOL/L — SIGNIFICANT CHANGE UP (ref 22–31)
CREAT SERPL-MCNC: 0.86 MG/DL — SIGNIFICANT CHANGE UP (ref 0.5–1.3)
EOSINOPHIL # BLD AUTO: 0.3 K/UL — SIGNIFICANT CHANGE UP (ref 0–0.5)
EOSINOPHIL NFR BLD AUTO: 5.6 % — SIGNIFICANT CHANGE UP (ref 0–6)
GLUCOSE SERPL-MCNC: 91 MG/DL — SIGNIFICANT CHANGE UP (ref 70–99)
HCT VFR BLD CALC: 41.4 % — SIGNIFICANT CHANGE UP (ref 39–50)
HGB BLD-MCNC: 12.8 G/DL — LOW (ref 13–17)
IMM GRANULOCYTES NFR BLD AUTO: 0.2 % — SIGNIFICANT CHANGE UP (ref 0–1.5)
LYMPHOCYTES # BLD AUTO: 1.86 K/UL — SIGNIFICANT CHANGE UP (ref 1–3.3)
LYMPHOCYTES # BLD AUTO: 34.6 % — SIGNIFICANT CHANGE UP (ref 13–44)
MANUAL SMEAR VERIFICATION: SIGNIFICANT CHANGE UP
MCHC RBC-ENTMCNC: 26.2 PG — LOW (ref 27–34)
MCHC RBC-ENTMCNC: 30.9 GM/DL — LOW (ref 32–36)
MCV RBC AUTO: 84.8 FL — SIGNIFICANT CHANGE UP (ref 80–100)
MONOCYTES # BLD AUTO: 0.82 K/UL — SIGNIFICANT CHANGE UP (ref 0–0.9)
MONOCYTES NFR BLD AUTO: 15.2 % — HIGH (ref 2–14)
NEUTROPHILS # BLD AUTO: 2.34 K/UL — SIGNIFICANT CHANGE UP (ref 1.8–7.4)
NEUTROPHILS NFR BLD AUTO: 43.5 % — SIGNIFICANT CHANGE UP (ref 43–77)
NRBC # BLD: 0 /100 WBCS — SIGNIFICANT CHANGE UP (ref 0–0)
PLAT MORPH BLD: NORMAL — SIGNIFICANT CHANGE UP
PLATELET # BLD AUTO: 207 K/UL — SIGNIFICANT CHANGE UP (ref 150–400)
POTASSIUM SERPL-MCNC: 4.1 MMOL/L — SIGNIFICANT CHANGE UP (ref 3.5–5.3)
POTASSIUM SERPL-SCNC: 4.1 MMOL/L — SIGNIFICANT CHANGE UP (ref 3.5–5.3)
PROT SERPL-MCNC: 7 GM/DL — SIGNIFICANT CHANGE UP (ref 6–8.3)
RBC # BLD: 4.88 M/UL — SIGNIFICANT CHANGE UP (ref 4.2–5.8)
RBC # FLD: 20.9 % — HIGH (ref 10.3–14.5)
RBC BLD AUTO: SIGNIFICANT CHANGE UP
SODIUM SERPL-SCNC: 140 MMOL/L — SIGNIFICANT CHANGE UP (ref 135–145)
WBC # BLD: 5.38 K/UL — SIGNIFICANT CHANGE UP (ref 3.8–10.5)
WBC # FLD AUTO: 5.38 K/UL — SIGNIFICANT CHANGE UP (ref 3.8–10.5)

## 2018-03-24 PROCEDURE — 74174 CTA ABD&PLVS W/CONTRAST: CPT | Mod: 26

## 2018-03-24 RX ADMIN — SODIUM CHLORIDE 1000 MILLILITER(S): 9 INJECTION INTRAMUSCULAR; INTRAVENOUS; SUBCUTANEOUS at 00:22

## 2018-04-10 ENCOUNTER — EMERGENCY (EMERGENCY)
Facility: HOSPITAL | Age: 63
LOS: 0 days | Discharge: HOME | End: 2018-04-10
Attending: EMERGENCY MEDICINE | Admitting: EMERGENCY MEDICINE
Payer: MEDICARE

## 2018-04-10 VITALS
DIASTOLIC BLOOD PRESSURE: 72 MMHG | RESPIRATION RATE: 19 BRPM | HEIGHT: 67 IN | HEART RATE: 98 BPM | SYSTOLIC BLOOD PRESSURE: 118 MMHG | OXYGEN SATURATION: 100 % | WEIGHT: 154.98 LBS

## 2018-04-10 VITALS
DIASTOLIC BLOOD PRESSURE: 74 MMHG | SYSTOLIC BLOOD PRESSURE: 122 MMHG | TEMPERATURE: 98 F | RESPIRATION RATE: 18 BRPM | HEART RATE: 92 BPM | OXYGEN SATURATION: 100 %

## 2018-04-10 VITALS
HEIGHT: 67 IN | SYSTOLIC BLOOD PRESSURE: 111 MMHG | HEART RATE: 89 BPM | DIASTOLIC BLOOD PRESSURE: 66 MMHG | OXYGEN SATURATION: 100 % | TEMPERATURE: 98 F | WEIGHT: 154.98 LBS | RESPIRATION RATE: 18 BRPM

## 2018-04-10 DIAGNOSIS — G89.29 OTHER CHRONIC PAIN: ICD-10-CM

## 2018-04-10 DIAGNOSIS — R51 HEADACHE: ICD-10-CM

## 2018-04-10 DIAGNOSIS — Z98.890 OTHER SPECIFIED POSTPROCEDURAL STATES: Chronic | ICD-10-CM

## 2018-04-10 DIAGNOSIS — Z59.0 HOMELESSNESS: ICD-10-CM

## 2018-04-10 DIAGNOSIS — R10.9 UNSPECIFIED ABDOMINAL PAIN: ICD-10-CM

## 2018-04-10 DIAGNOSIS — I71.4 ABDOMINAL AORTIC ANEURYSM, WITHOUT RUPTURE: Chronic | ICD-10-CM

## 2018-04-10 DIAGNOSIS — Z79.899 OTHER LONG TERM (CURRENT) DRUG THERAPY: ICD-10-CM

## 2018-04-10 DIAGNOSIS — I10 ESSENTIAL (PRIMARY) HYPERTENSION: ICD-10-CM

## 2018-04-10 DIAGNOSIS — F20.9 SCHIZOPHRENIA, UNSPECIFIED: ICD-10-CM

## 2018-04-10 LAB
ALBUMIN SERPL ELPH-MCNC: 3.5 G/DL — SIGNIFICANT CHANGE UP (ref 3.3–5)
ALP SERPL-CCNC: 103 U/L — SIGNIFICANT CHANGE UP (ref 40–120)
ALT FLD-CCNC: 19 U/L — SIGNIFICANT CHANGE UP (ref 12–78)
ANION GAP SERPL CALC-SCNC: 10 MMOL/L — SIGNIFICANT CHANGE UP (ref 5–17)
APTT BLD: 29.4 SEC — SIGNIFICANT CHANGE UP (ref 27.5–37.4)
AST SERPL-CCNC: 21 U/L — SIGNIFICANT CHANGE UP (ref 15–37)
BASOPHILS # BLD AUTO: 0.05 K/UL — SIGNIFICANT CHANGE UP (ref 0–0.2)
BASOPHILS NFR BLD AUTO: 1.2 % — SIGNIFICANT CHANGE UP (ref 0–2)
BILIRUB SERPL-MCNC: 0.5 MG/DL — SIGNIFICANT CHANGE UP (ref 0.2–1.2)
BLD GP AB SCN SERPL QL: SIGNIFICANT CHANGE UP
BUN SERPL-MCNC: 17 MG/DL — SIGNIFICANT CHANGE UP (ref 7–23)
CALCIUM SERPL-MCNC: 8.8 MG/DL — SIGNIFICANT CHANGE UP (ref 8.5–10.1)
CHLORIDE SERPL-SCNC: 105 MMOL/L — SIGNIFICANT CHANGE UP (ref 96–108)
CO2 SERPL-SCNC: 24 MMOL/L — SIGNIFICANT CHANGE UP (ref 22–31)
CREAT SERPL-MCNC: 0.83 MG/DL — SIGNIFICANT CHANGE UP (ref 0.5–1.3)
EOSINOPHIL # BLD AUTO: 0.17 K/UL — SIGNIFICANT CHANGE UP (ref 0–0.5)
EOSINOPHIL NFR BLD AUTO: 4.2 % — SIGNIFICANT CHANGE UP (ref 0–6)
GLUCOSE SERPL-MCNC: 79 MG/DL — SIGNIFICANT CHANGE UP (ref 70–99)
HCT VFR BLD CALC: 40.6 % — SIGNIFICANT CHANGE UP (ref 39–50)
HGB BLD-MCNC: 13.2 G/DL — SIGNIFICANT CHANGE UP (ref 13–17)
IMM GRANULOCYTES NFR BLD AUTO: 0.5 % — SIGNIFICANT CHANGE UP (ref 0–1.5)
INR BLD: 1.11 RATIO — SIGNIFICANT CHANGE UP (ref 0.88–1.16)
LIDOCAIN IGE QN: 120 U/L — SIGNIFICANT CHANGE UP (ref 73–393)
LYMPHOCYTES # BLD AUTO: 1.61 K/UL — SIGNIFICANT CHANGE UP (ref 1–3.3)
LYMPHOCYTES # BLD AUTO: 39.7 % — SIGNIFICANT CHANGE UP (ref 13–44)
MCHC RBC-ENTMCNC: 27 PG — SIGNIFICANT CHANGE UP (ref 27–34)
MCHC RBC-ENTMCNC: 32.5 GM/DL — SIGNIFICANT CHANGE UP (ref 32–36)
MCV RBC AUTO: 83.2 FL — SIGNIFICANT CHANGE UP (ref 80–100)
MONOCYTES # BLD AUTO: 0.49 K/UL — SIGNIFICANT CHANGE UP (ref 0–0.9)
MONOCYTES NFR BLD AUTO: 12.1 % — SIGNIFICANT CHANGE UP (ref 2–14)
NEUTROPHILS # BLD AUTO: 1.72 K/UL — LOW (ref 1.8–7.4)
NEUTROPHILS NFR BLD AUTO: 42.3 % — LOW (ref 43–77)
NRBC # BLD: 0 /100 WBCS — SIGNIFICANT CHANGE UP (ref 0–0)
PLATELET # BLD AUTO: 207 K/UL — SIGNIFICANT CHANGE UP (ref 150–400)
POTASSIUM SERPL-MCNC: 3.7 MMOL/L — SIGNIFICANT CHANGE UP (ref 3.5–5.3)
POTASSIUM SERPL-SCNC: 3.7 MMOL/L — SIGNIFICANT CHANGE UP (ref 3.5–5.3)
PROT SERPL-MCNC: 7.4 GM/DL — SIGNIFICANT CHANGE UP (ref 6–8.3)
PROTHROM AB SERPL-ACNC: 12 SEC — SIGNIFICANT CHANGE UP (ref 9.8–12.7)
RBC # BLD: 4.88 M/UL — SIGNIFICANT CHANGE UP (ref 4.2–5.8)
RBC # FLD: 19.7 % — HIGH (ref 10.3–14.5)
SODIUM SERPL-SCNC: 139 MMOL/L — SIGNIFICANT CHANGE UP (ref 135–145)
TYPE + AB SCN PNL BLD: SIGNIFICANT CHANGE UP
WBC # BLD: 4.06 K/UL — SIGNIFICANT CHANGE UP (ref 3.8–10.5)
WBC # FLD AUTO: 4.06 K/UL — SIGNIFICANT CHANGE UP (ref 3.8–10.5)

## 2018-04-10 PROCEDURE — 71045 X-RAY EXAM CHEST 1 VIEW: CPT | Mod: 26

## 2018-04-10 PROCEDURE — 99283 EMERGENCY DEPT VISIT LOW MDM: CPT | Mod: 25

## 2018-04-10 PROCEDURE — 99285 EMERGENCY DEPT VISIT HI MDM: CPT

## 2018-04-10 RX ORDER — FAMOTIDINE 10 MG/ML
20 INJECTION INTRAVENOUS ONCE
Qty: 0 | Refills: 0 | Status: DISCONTINUED | OUTPATIENT
Start: 2018-04-10 | End: 2018-04-10

## 2018-04-10 RX ORDER — SODIUM CHLORIDE 9 MG/ML
3 INJECTION INTRAMUSCULAR; INTRAVENOUS; SUBCUTANEOUS ONCE
Qty: 0 | Refills: 0 | Status: DISCONTINUED | OUTPATIENT
Start: 2018-04-10 | End: 2018-04-10

## 2018-04-10 RX ORDER — SODIUM CHLORIDE 9 MG/ML
1000 INJECTION INTRAMUSCULAR; INTRAVENOUS; SUBCUTANEOUS ONCE
Qty: 0 | Refills: 0 | Status: DISCONTINUED | OUTPATIENT
Start: 2018-04-10 | End: 2018-04-10

## 2018-04-10 RX ORDER — ACETAMINOPHEN 500 MG
650 TABLET ORAL ONCE
Qty: 0 | Refills: 0 | Status: DISCONTINUED | OUTPATIENT
Start: 2018-04-10 | End: 2018-04-10

## 2018-04-10 SDOH — ECONOMIC STABILITY - HOUSING INSECURITY: HOMELESSNESS: Z59.0

## 2018-04-10 NOTE — ED PROVIDER NOTE - MUSCULOSKELETAL, MLM
Spine appears normal, range of motion is not limited, no muscle or joint tenderness. +SANTIZO x4, no focal swelling or tenderness.

## 2018-04-10 NOTE — ED PROVIDER NOTE - CARE PLAN
Principal Discharge DX:	Chronic abdominal pain  Secondary Diagnosis:	Chronic nonintractable headache, unspecified headache type  Secondary Diagnosis:	Homeless single person

## 2018-04-10 NOTE — ED PROVIDER NOTE - SKIN, MLM
Skin normal color for race, warm, dry and intact. No evidence of rash. +Plantar surface B/L 1st toes +bunion/callous formation. No infection.

## 2018-04-10 NOTE — ED PROVIDER NOTE - MEDICAL DECISION MAKING DETAILS
63 y/o homeless black male PMHx s/p endovascular repair of AAA ambulatory to the ED, c/o chronic HA and mid to upper abd discomfort. No nausea, vomiting, diarrhea, CP, SOB. +Mild RUQ tenderness, River's negative.   Plan: Labs, IV fluids, urine, IV Pepcid, CT angio abd/pelvis. Observe, reassess.

## 2018-04-10 NOTE — ED ADULT NURSE NOTE - OBJECTIVE STATEMENT
pt c/o headache. hx AAA. denies abdominal pain. does not wish to take anything for HA. pt homeless. a&ox3.

## 2018-04-10 NOTE — ED PROVIDER NOTE - OBJECTIVE STATEMENT
61 yo male biba for c/o headache and homelessness.  Headache described as an ache 3/10. no recent trauma. Pt has ho  AAA endograft repair and denies abdominal pain at this time. Pt states he just needs a place to stay for the night and will be "out of here in the morning" no f/c/n/v/d/cp or abdominal pain
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
## 2018-04-10 NOTE — ED ADULT NURSE NOTE - CHPI ED SYMPTOMS NEG
no fever/no vomiting/no burning urination/no dysuria/no hematuria/no chills/no abdominal distension/no blood in stool/no diarrhea/no nausea

## 2018-04-10 NOTE — ED ADULT NURSE REASSESSMENT NOTE - NS ED NURSE REASSESS COMMENT FT1
pt received. no acute distress noted. pt refusing IVL and meds at this time. dr. jasso aware. will monitor.

## 2018-04-10 NOTE — ED PROVIDER NOTE - PROGRESS NOTE DETAILS
Dr. Sutton:  Labs reviewed: WNL.  Pt refusing IV for IVF/ IV mesds, IV cont for CTA A/P.  CT head noncon & CTA A/P of 3/23-24 overnight negative.  Pt presently in good comfort, no active HA nor abd pain, drinking tea.  Pt clinically stable.

## 2018-04-10 NOTE — ED PROVIDER NOTE - CONSTITUTIONAL, MLM
normal... Black male, alert, no respiratory distress, not acutely ill, oriented to person, place, time/situation and in no apparent distress.

## 2018-04-10 NOTE — ED ADULT NURSE REASSESSMENT NOTE - NS ED NURSE REASSESS COMMENT FT1
pt ambulating to bathroom alert and oriented no complaints at this time pt ambulating to bathroom alert and oriented no complaints at this time  0542: pt provided w/ discharge instructions- states he will "leave when the sun rises". instructed to wait in waiting room. pt getting dressed at this time. 0424: pt ambulating to bathroom alert and oriented no complaints at this time  0542: pt provided w/ discharge instructions- states he will "leave when the sun rises". instructed to wait in waiting room. pt getting dressed at this time.

## 2018-04-11 ENCOUNTER — EMERGENCY (EMERGENCY)
Facility: HOSPITAL | Age: 63
LOS: 0 days | Discharge: HOME | End: 2018-04-11
Attending: FAMILY MEDICINE | Admitting: FAMILY MEDICINE
Payer: MEDICARE

## 2018-04-11 VITALS
WEIGHT: 154.98 LBS | DIASTOLIC BLOOD PRESSURE: 80 MMHG | HEART RATE: 84 BPM | SYSTOLIC BLOOD PRESSURE: 122 MMHG | OXYGEN SATURATION: 100 % | TEMPERATURE: 98 F | RESPIRATION RATE: 17 BRPM

## 2018-04-11 DIAGNOSIS — M79.89 OTHER SPECIFIED SOFT TISSUE DISORDERS: ICD-10-CM

## 2018-04-11 DIAGNOSIS — M79.671 PAIN IN RIGHT FOOT: ICD-10-CM

## 2018-04-11 DIAGNOSIS — Z98.890 OTHER SPECIFIED POSTPROCEDURAL STATES: Chronic | ICD-10-CM

## 2018-04-11 DIAGNOSIS — G89.29 OTHER CHRONIC PAIN: ICD-10-CM

## 2018-04-11 DIAGNOSIS — I71.4 ABDOMINAL AORTIC ANEURYSM, WITHOUT RUPTURE: Chronic | ICD-10-CM

## 2018-04-11 DIAGNOSIS — I10 ESSENTIAL (PRIMARY) HYPERTENSION: ICD-10-CM

## 2018-04-11 DIAGNOSIS — M79.672 PAIN IN LEFT FOOT: ICD-10-CM

## 2018-04-11 DIAGNOSIS — F20.9 SCHIZOPHRENIA, UNSPECIFIED: ICD-10-CM

## 2018-04-11 PROCEDURE — 99283 EMERGENCY DEPT VISIT LOW MDM: CPT

## 2018-04-11 NOTE — ED ADULT NURSE REASSESSMENT NOTE - NS ED NURSE REASSESS COMMENT FT1
pt is belligerent, refuses to talk to this RN, Charge RN aware.  Pt refused to give information for RN assessment, unable to complete.

## 2018-04-11 NOTE — ED PROVIDER NOTE - MEDICAL DECISION MAKING DETAILS
Homeless male with hx of schizophrenia presents with delusions, non compliant, c/o of bilat foot pain, and questionable blisters. Will prescribe cream and podiatry follow up. Homeless male with hx of schizophrenia presents with delusions, non compliant, c/o of bilat foot pain, and questionable blisters. Will prescribe Clotrimazole cream and podiatry follow up.

## 2018-04-11 NOTE — ED PROVIDER NOTE - CONSTITUTIONAL, MLM
normal... Belligerent male, well appearing, well nourished, awake, alert, oriented to person, place, time/situation and in no apparent distress.
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
## 2018-04-11 NOTE — ED ADULT NURSE REASSESSMENT NOTE - NS ED NURSE REASSESS COMMENT FT1
pt refused to sign discharge papers, refused vital signs, refused medication.  Pt states "Ill leave when I'm ready".  Charge RN aware.

## 2018-04-11 NOTE — ED ADULT NURSE NOTE - CHIEF COMPLAINT QUOTE
pt presents to ED with complaints of BL feet pain pt was seen in  ED last night as per EMS pt walked into ambulance building requesting ride to hospital

## 2018-04-12 ENCOUNTER — EMERGENCY (EMERGENCY)
Facility: HOSPITAL | Age: 63
LOS: 0 days | Discharge: HOME | End: 2018-04-13
Attending: EMERGENCY MEDICINE | Admitting: EMERGENCY MEDICINE
Payer: MEDICARE

## 2018-04-12 ENCOUNTER — EMERGENCY (EMERGENCY)
Facility: HOSPITAL | Age: 63
LOS: 0 days | Discharge: HOME | End: 2018-04-12
Attending: EMERGENCY MEDICINE | Admitting: FAMILY MEDICINE
Payer: MEDICARE

## 2018-04-12 VITALS
DIASTOLIC BLOOD PRESSURE: 82 MMHG | WEIGHT: 154.98 LBS | HEART RATE: 74 BPM | HEIGHT: 67 IN | OXYGEN SATURATION: 100 % | RESPIRATION RATE: 16 BRPM | SYSTOLIC BLOOD PRESSURE: 130 MMHG | TEMPERATURE: 98 F

## 2018-04-12 VITALS
HEART RATE: 78 BPM | TEMPERATURE: 98 F | DIASTOLIC BLOOD PRESSURE: 76 MMHG | RESPIRATION RATE: 18 BRPM | SYSTOLIC BLOOD PRESSURE: 123 MMHG | OXYGEN SATURATION: 99 %

## 2018-04-12 VITALS
SYSTOLIC BLOOD PRESSURE: 125 MMHG | WEIGHT: 149.91 LBS | TEMPERATURE: 98 F | HEIGHT: 65 IN | OXYGEN SATURATION: 98 % | DIASTOLIC BLOOD PRESSURE: 73 MMHG | RESPIRATION RATE: 18 BRPM | HEART RATE: 87 BPM

## 2018-04-12 DIAGNOSIS — I10 ESSENTIAL (PRIMARY) HYPERTENSION: ICD-10-CM

## 2018-04-12 DIAGNOSIS — Z59.0 HOMELESSNESS: ICD-10-CM

## 2018-04-12 DIAGNOSIS — Z98.890 OTHER SPECIFIED POSTPROCEDURAL STATES: Chronic | ICD-10-CM

## 2018-04-12 DIAGNOSIS — I71.4 ABDOMINAL AORTIC ANEURYSM, WITHOUT RUPTURE: Chronic | ICD-10-CM

## 2018-04-12 DIAGNOSIS — M79.671 PAIN IN RIGHT FOOT: ICD-10-CM

## 2018-04-12 DIAGNOSIS — Z91.018 ALLERGY TO OTHER FOODS: ICD-10-CM

## 2018-04-12 DIAGNOSIS — M79.672 PAIN IN LEFT FOOT: ICD-10-CM

## 2018-04-12 DIAGNOSIS — F20.0 PARANOID SCHIZOPHRENIA: ICD-10-CM

## 2018-04-12 DIAGNOSIS — Z88.0 ALLERGY STATUS TO PENICILLIN: ICD-10-CM

## 2018-04-12 DIAGNOSIS — Z88.8 ALLERGY STATUS TO OTHER DRUGS, MEDICAMENTS AND BIOLOGICAL SUBSTANCES STATUS: ICD-10-CM

## 2018-04-12 PROCEDURE — 99283 EMERGENCY DEPT VISIT LOW MDM: CPT | Mod: 25

## 2018-04-12 PROCEDURE — 99283 EMERGENCY DEPT VISIT LOW MDM: CPT

## 2018-04-12 SDOH — ECONOMIC STABILITY - HOUSING INSECURITY: HOMELESSNESS: Z59.0

## 2018-04-12 NOTE — ED ADULT NURSE REASSESSMENT NOTE - NS ED NURSE REASSESS COMMENT FT1
Pt resting comfortable in bed, ambulating well with no assist. Pt updated on plan of care. SW pending

## 2018-04-12 NOTE — ED ADULT NURSE NOTE - OBJECTIVE STATEMENT
Pt presents to ER c/o chronic b/l feet pain and left lower extremity discomfort and abd discomfort. Pt reports symptoms have been ongoing and are chronic. Pt ambulates well with no assist, skin intact. +2 pulses in b/l lower extremity. AO x 3 oriented to baseline, normal breathing pattern with no difficulty. Denies n/v/d, abd is non distended, non tender

## 2018-04-12 NOTE — ED ADULT NURSE NOTE - CHIEF COMPLAINT QUOTE
Pt presents to the ED with complaints of chronic leg pain. Pt ambulated himself to [facility] to be brought to ED. No trauma, no injury.

## 2018-04-12 NOTE — ED ADULT TRIAGE NOTE - CHIEF COMPLAINT QUOTE
Pt presents to the ED with complaints of chronic leg pain. Pt ambulated himself to [facility] to be brought to ED. No trauma, no injury.

## 2018-04-12 NOTE — ED PROVIDER NOTE - OBJECTIVE STATEMENT
63 y/o m with PMHx of AAA, HTN, Paranoid schizophrenia presenting to the ED with no c/o, just states he needs a place to stay. Hx of frequent visits to ED for homelessness.
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
## 2018-04-13 ENCOUNTER — EMERGENCY (EMERGENCY)
Facility: HOSPITAL | Age: 63
LOS: 0 days | Discharge: LEFT BEFORE DISCHARGE | End: 2018-04-13
Attending: EMERGENCY MEDICINE | Admitting: EMERGENCY MEDICINE
Payer: MEDICARE

## 2018-04-13 VITALS
OXYGEN SATURATION: 100 % | RESPIRATION RATE: 14 BRPM | HEART RATE: 100 BPM | HEIGHT: 68 IN | SYSTOLIC BLOOD PRESSURE: 166 MMHG | DIASTOLIC BLOOD PRESSURE: 92 MMHG | WEIGHT: 149.91 LBS | TEMPERATURE: 98 F

## 2018-04-13 DIAGNOSIS — R51 HEADACHE: ICD-10-CM

## 2018-04-13 DIAGNOSIS — I10 ESSENTIAL (PRIMARY) HYPERTENSION: ICD-10-CM

## 2018-04-13 DIAGNOSIS — Z91.018 ALLERGY TO OTHER FOODS: ICD-10-CM

## 2018-04-13 DIAGNOSIS — Z98.890 OTHER SPECIFIED POSTPROCEDURAL STATES: Chronic | ICD-10-CM

## 2018-04-13 DIAGNOSIS — I71.4 ABDOMINAL AORTIC ANEURYSM, WITHOUT RUPTURE: Chronic | ICD-10-CM

## 2018-04-13 DIAGNOSIS — Z88.8 ALLERGY STATUS TO OTHER DRUGS, MEDICAMENTS AND BIOLOGICAL SUBSTANCES STATUS: ICD-10-CM

## 2018-04-13 DIAGNOSIS — F20.9 SCHIZOPHRENIA, UNSPECIFIED: ICD-10-CM

## 2018-04-13 DIAGNOSIS — Z88.0 ALLERGY STATUS TO PENICILLIN: ICD-10-CM

## 2018-04-13 PROCEDURE — 99283 EMERGENCY DEPT VISIT LOW MDM: CPT | Mod: 25

## 2018-04-13 NOTE — ED PROVIDER NOTE - OBJECTIVE STATEMENT
pt here rashad si or hi states has chronic genralized achy HA . no hallucinations. denies fever. denies neck pain. no chest pain or sob. no abd pain. no n/v/d. no urinary f/u/d. no back pain. no motor or sensory deficits. denies illicit drug use. no recent travel. no rash. no other acute issues symptoms or concerns

## 2018-04-13 NOTE — ED ADULT NURSE REASSESSMENT NOTE - NS ED NURSE REASSESS COMMENT FT1
patient got up to walk to [facility]AN[facility] bathroom. followed and asked where he was going. patient irritated at my asking. patient used bathroom then returned to bed. currently sleeping.  no complaints.

## 2018-04-14 ENCOUNTER — EMERGENCY (EMERGENCY)
Facility: HOSPITAL | Age: 63
LOS: 0 days | Discharge: HOME | End: 2018-04-14
Attending: EMERGENCY MEDICINE | Admitting: EMERGENCY MEDICINE
Payer: MEDICARE

## 2018-04-14 VITALS
TEMPERATURE: 98 F | DIASTOLIC BLOOD PRESSURE: 77 MMHG | WEIGHT: 154.98 LBS | HEART RATE: 102 BPM | RESPIRATION RATE: 17 BRPM | SYSTOLIC BLOOD PRESSURE: 132 MMHG | OXYGEN SATURATION: 100 %

## 2018-04-14 DIAGNOSIS — Z98.890 OTHER SPECIFIED POSTPROCEDURAL STATES: Chronic | ICD-10-CM

## 2018-04-14 DIAGNOSIS — I71.4 ABDOMINAL AORTIC ANEURYSM, WITHOUT RUPTURE: Chronic | ICD-10-CM

## 2018-04-14 DIAGNOSIS — R53.1 WEAKNESS: ICD-10-CM

## 2018-04-14 DIAGNOSIS — Z59.0 HOMELESSNESS: ICD-10-CM

## 2018-04-14 DIAGNOSIS — Z88.8 ALLERGY STATUS TO OTHER DRUGS, MEDICAMENTS AND BIOLOGICAL SUBSTANCES STATUS: ICD-10-CM

## 2018-04-14 DIAGNOSIS — Z91.018 ALLERGY TO OTHER FOODS: ICD-10-CM

## 2018-04-14 DIAGNOSIS — M79.1 MYALGIA: ICD-10-CM

## 2018-04-14 DIAGNOSIS — Z88.0 ALLERGY STATUS TO PENICILLIN: ICD-10-CM

## 2018-04-14 PROCEDURE — 99283 EMERGENCY DEPT VISIT LOW MDM: CPT

## 2018-04-14 RX ORDER — ACETAMINOPHEN 500 MG
975 TABLET ORAL ONCE
Qty: 0 | Refills: 0 | Status: COMPLETED | OUTPATIENT
Start: 2018-04-14 | End: 2018-04-14

## 2018-04-14 RX ADMIN — Medication 975 MILLIGRAM(S): at 18:54

## 2018-04-14 SDOH — ECONOMIC STABILITY - HOUSING INSECURITY: HOMELESSNESS: Z59.0

## 2018-04-14 NOTE — ED STATDOCS - MEDICAL DECISION MAKING DETAILS
Pt presenting looking for shelter, +generalized pain. Pt has been here many times in the last week with the same complaint, will offer food and drink and discharge, no acute medical issues today. Pt presenting looking for shelter, +generalized pain. Pt has been here many times this week with the same complaint, will offer food and drink and discharge, no acute medical issues today.

## 2018-04-14 NOTE — ED STATDOCS - OBJECTIVE STATEMENT
61 y/o male with PMHx of AAA, HTN, schizophrenia presents to the ED c/o +generalized body pain. Pt is homeless and states he is "looking for a place to stay tonight." Pt is scheduled to see  in [place] Monday morning (2 days from now). Pt currently denies hunger, has been homeless for 10+ years. Allergic to Penicillin. 61 y/o male with PMHx of AAA, HTN, schizophrenia presents to the ED c/o +generalized body pain. Pt is homeless and states he is "looking for a place to stay tonight." Pt is scheduled to see  in [place] Monday morning (2 days from now). Pt currently denies hunger, has been homeless for 10+ years. Pt frequent visitor for same complaints to this ER. Denies trauma or drug use. Allergic to Penicillin.

## 2018-04-15 ENCOUNTER — EMERGENCY (EMERGENCY)
Facility: HOSPITAL | Age: 63
LOS: 0 days | Discharge: HOME | End: 2018-04-15
Attending: EMERGENCY MEDICINE | Admitting: EMERGENCY MEDICINE
Payer: MEDICARE

## 2018-04-15 VITALS
HEIGHT: 67 IN | RESPIRATION RATE: 18 BRPM | DIASTOLIC BLOOD PRESSURE: 108 MMHG | SYSTOLIC BLOOD PRESSURE: 192 MMHG | TEMPERATURE: 98 F | OXYGEN SATURATION: 100 % | WEIGHT: 154.98 LBS | HEART RATE: 110 BPM

## 2018-04-15 DIAGNOSIS — Z98.890 OTHER SPECIFIED POSTPROCEDURAL STATES: Chronic | ICD-10-CM

## 2018-04-15 DIAGNOSIS — I71.4 ABDOMINAL AORTIC ANEURYSM, WITHOUT RUPTURE: Chronic | ICD-10-CM

## 2018-04-15 DIAGNOSIS — I10 ESSENTIAL (PRIMARY) HYPERTENSION: ICD-10-CM

## 2018-04-15 DIAGNOSIS — Z88.8 ALLERGY STATUS TO OTHER DRUGS, MEDICAMENTS AND BIOLOGICAL SUBSTANCES STATUS: ICD-10-CM

## 2018-04-15 DIAGNOSIS — R51 HEADACHE: ICD-10-CM

## 2018-04-15 DIAGNOSIS — Z88.0 ALLERGY STATUS TO PENICILLIN: ICD-10-CM

## 2018-04-15 DIAGNOSIS — M79.671 PAIN IN RIGHT FOOT: ICD-10-CM

## 2018-04-15 DIAGNOSIS — Z91.018 ALLERGY TO OTHER FOODS: ICD-10-CM

## 2018-04-15 DIAGNOSIS — M79.672 PAIN IN LEFT FOOT: ICD-10-CM

## 2018-04-15 DIAGNOSIS — L84 CORNS AND CALLOSITIES: ICD-10-CM

## 2018-04-15 PROCEDURE — 99283 EMERGENCY DEPT VISIT LOW MDM: CPT

## 2018-04-15 RX ORDER — IBUPROFEN 200 MG
600 TABLET ORAL ONCE
Qty: 0 | Refills: 0 | Status: COMPLETED | OUTPATIENT
Start: 2018-04-15 | End: 2018-04-15

## 2018-04-15 RX ADMIN — Medication 600 MILLIGRAM(S): at 20:40

## 2018-04-15 NOTE — ED ADULT NURSE REASSESSMENT NOTE - NS ED NURSE REASSESS COMMENT FT1
Patient was treated, evaluated and discharged by intake provider. Please see provider's notes for assessment.  Pt was given information on Emergency housing.

## 2018-04-15 NOTE — ED STATDOCS - OBJECTIVE STATEMENT
63 y/o male with PMHx of AAA, HTN, Schizophrenia presents to the ED with multiple complaints. +HA. +abd pain. +bilateral foot pain, warts. Pt is homeless. Pt is supposed to go to  tomorrow to discuss living situation. Denies trauma. States that he took two Tylenol yesterday for sx.

## 2018-04-15 NOTE — ED STATDOCS - MEDICAL DECISION MAKING DETAILS
offered cream for callouses on feet, offered meal and refused everything. appt with  already scheduled.

## 2018-04-16 ENCOUNTER — EMERGENCY (EMERGENCY)
Facility: HOSPITAL | Age: 63
LOS: 0 days | Discharge: HOME | End: 2018-04-16
Attending: EMERGENCY MEDICINE | Admitting: EMERGENCY MEDICINE
Payer: MEDICARE

## 2018-04-16 VITALS
OXYGEN SATURATION: 100 % | WEIGHT: 154.98 LBS | HEART RATE: 72 BPM | TEMPERATURE: 98 F | SYSTOLIC BLOOD PRESSURE: 144 MMHG | HEIGHT: 67 IN | RESPIRATION RATE: 16 BRPM | DIASTOLIC BLOOD PRESSURE: 81 MMHG

## 2018-04-16 DIAGNOSIS — Z98.890 OTHER SPECIFIED POSTPROCEDURAL STATES: Chronic | ICD-10-CM

## 2018-04-16 DIAGNOSIS — Z88.8 ALLERGY STATUS TO OTHER DRUGS, MEDICAMENTS AND BIOLOGICAL SUBSTANCES STATUS: ICD-10-CM

## 2018-04-16 DIAGNOSIS — Z88.0 ALLERGY STATUS TO PENICILLIN: ICD-10-CM

## 2018-04-16 DIAGNOSIS — I71.4 ABDOMINAL AORTIC ANEURYSM, WITHOUT RUPTURE: Chronic | ICD-10-CM

## 2018-04-16 DIAGNOSIS — F17.200 NICOTINE DEPENDENCE, UNSPECIFIED, UNCOMPLICATED: ICD-10-CM

## 2018-04-16 DIAGNOSIS — Z59.0 HOMELESSNESS: ICD-10-CM

## 2018-04-16 DIAGNOSIS — Z91.018 ALLERGY TO OTHER FOODS: ICD-10-CM

## 2018-04-16 DIAGNOSIS — I10 ESSENTIAL (PRIMARY) HYPERTENSION: ICD-10-CM

## 2018-04-16 PROCEDURE — 99283 EMERGENCY DEPT VISIT LOW MDM: CPT

## 2018-04-16 SDOH — ECONOMIC STABILITY - HOUSING INSECURITY: HOMELESSNESS: Z59.0

## 2018-04-16 NOTE — ED STATDOCS - MEDICAL DECISION MAKING DETAILS
Meal provided and patient asking for more money or bus tokens.  Pt. will be discharged as there is no medical intervention needed.  Pt. will need to wait for social work vs. find his own transport to  in AM.

## 2018-04-16 NOTE — ED STATDOCS - OBJECTIVE STATEMENT
61 y/o male with PMHx of AAA, HTN, Schizophrenia presents to the ED, pt is homeless, states he went to  today. Reports he was told to wait for cab but was waiting over 1 hour, pt then took bus from [place] to [place] to [facility] to [facility] and was BIB to ED by ambulance. Pt has no medical complaints, only social complaints. Pt was here for past 2 days for same complaints. Pt has another visit w/  in [place] tomorrow AM.

## 2018-04-17 ENCOUNTER — EMERGENCY (EMERGENCY)
Facility: HOSPITAL | Age: 63
LOS: 0 days | Discharge: HOME | End: 2018-04-17
Attending: EMERGENCY MEDICINE | Admitting: EMERGENCY MEDICINE
Payer: MEDICARE

## 2018-04-17 VITALS
DIASTOLIC BLOOD PRESSURE: 78 MMHG | OXYGEN SATURATION: 100 % | HEIGHT: 66 IN | RESPIRATION RATE: 16 BRPM | SYSTOLIC BLOOD PRESSURE: 132 MMHG | WEIGHT: 156.97 LBS | HEART RATE: 84 BPM

## 2018-04-17 DIAGNOSIS — Z98.890 OTHER SPECIFIED POSTPROCEDURAL STATES: Chronic | ICD-10-CM

## 2018-04-17 DIAGNOSIS — I71.4 ABDOMINAL AORTIC ANEURYSM, WITHOUT RUPTURE: Chronic | ICD-10-CM

## 2018-04-17 PROCEDURE — 99283 EMERGENCY DEPT VISIT LOW MDM: CPT

## 2018-04-17 NOTE — ED STATDOCS - OBJECTIVE STATEMENT
63 y/o male with PMHx of AAA, schizophrenia presents to the ED after DSS sent pt to a place to stay in [place] and the cab never came to pick him up after he waited for several hours. Pt is homeless and known to the ED.

## 2018-04-17 NOTE — ED ADULT NURSE NOTE - OBJECTIVE STATEMENT
Pt presents with abd pain and bilateral foot pain. pt seen in ED last night and d.c home. Pt denies n/v.

## 2018-04-17 NOTE — ED STATDOCS - PMH
AAA (abdominal aortic aneurysm) without rupture    Abdominal aortic aneurysm without rupture  12/2015  HTN (hypertension)    Paranoid schizophrenia    Paranoid schizophrenia    Schizophrenia    Schizophrenia

## 2018-04-18 DIAGNOSIS — R10.9 UNSPECIFIED ABDOMINAL PAIN: ICD-10-CM

## 2018-04-18 DIAGNOSIS — Z91.018 ALLERGY TO OTHER FOODS: ICD-10-CM

## 2018-04-18 DIAGNOSIS — Z88.0 ALLERGY STATUS TO PENICILLIN: ICD-10-CM

## 2018-04-18 DIAGNOSIS — Z88.8 ALLERGY STATUS TO OTHER DRUGS, MEDICAMENTS AND BIOLOGICAL SUBSTANCES STATUS: ICD-10-CM

## 2018-04-18 DIAGNOSIS — I10 ESSENTIAL (PRIMARY) HYPERTENSION: ICD-10-CM

## 2018-04-18 DIAGNOSIS — Z59.0 HOMELESSNESS: ICD-10-CM

## 2018-04-18 SDOH — ECONOMIC STABILITY - HOUSING INSECURITY: HOMELESSNESS: Z59.0

## 2018-10-09 NOTE — ED PROVIDER NOTE - CONSTITUTIONAL, MLM
normal... Unkempt appearing, well nourished, awake, alert, oriented to person, place, time/situation and in no apparent distress. No

## 2019-05-16 NOTE — ED ADULT NURSE NOTE - CHIEF COMPLAINT QUOTE
Transferred to [facility] from [facility] for vascular eval.  Patient is homeless, appears comfortable and in no apparent distress.  History of AAA in 2015.  Presented to [facility] for abdominal pain and headache.  Per transfer center, CT showed enlarged AAA without rupture. Denies chest pain, dizziness or SOB.  Patient is a poor historian and unable to provide further information. PAST MEDICAL HISTORY:  Arrhythmia     Benign Hypertension     CAD (Coronary Artery Disease)     Cerebellar Infarction     CHF (congestive heart failure)     CVA (Cerebral Vascular Accident)     Diabetes Mellitus, Type 2     Former smoker     HLD (hyperlipidemia)     Hyperthyroidism     MI (myocardial infarction)     Obesity     TIA (Transient Ischemic Attack)     Unsteady gait

## 2019-08-14 NOTE — DISCHARGE NOTE BEHAVIORAL HEALTH - NSBHDCCASEMGR_PSY_A_CORE
no Cellcept Counseling:  I discussed with the patient the risks of mycophenolate mofetil including but not limited to infection/immunosuppression, GI upset, hypokalemia, hypercholesterolemia, bone marrow suppression, lymphoproliferative disorders, malignancy, GI ulceration/bleed/perforation, colitis, interstitial lung disease, kidney failure, progressive multifocal leukoencephalopathy, and birth defects.  The patient understands that monitoring is required including a baseline creatinine and regular CBC testing. In addition, patient must alert us immediately if symptoms of infection or other concerning signs are noted.

## 2019-12-31 NOTE — ED STATDOCS - CPE ED ENMT NORM
GENERAL INSTRUCTIONS FOR ALL PROCEDURES    1.  Resume your regular diet and all previous home medications.  2.  If you have received sedation, do not drive, operate machinery, drink alcohol, make any important decisions or sign legal documents for 24 hours.  3.  It is not uncommon to notice discomfort after the local anesthetic wears off.  You may take your home pain medications and/or apply heat or ice to the injection site.  4.  You may have discomfort at the injection site for 24-48 hours.  You may also have bruising at the injection site which will gradually subside.    INSTRUCTIONS FOR PATIENTS HAVING STEROID INJECTION  5.  The steriod may take 36 hours or more before it starts working and you experience pain relief.  6.  If you are diabetic and steriods are used, monitor your blood sugar levels for the next 48 hours.   7.  You may experience numbness for several hours.  Exercise caution in using the affected area until the numbness subsides.You may not apply heat or ice to the injection site if numbness is present.  8.  Remove the Band-Aid 4-5 hours after the injection.  9.  You may take a shower. Do not take a tub bath or soak the injection site for 24 hours until the site is healed.  10.  Rest for the remainder of the day.  Resume normal activities as tolerated.  11.  We will call you within 24-48 hours after your procedure. If you do not receive the phone call, it is your responsibility to call us.  12.  Please call for a follow-up appointment within 1-2 weeks post procedure. You can call [phone] or [phone] to make the appointment.         INSTRUCTIONS FOR PATIENTS HAVING A FACETS INJECTION     1. Go home and do things that normally bring on your pain.  Do not go home and rest.  2.  Keep a pain diary for 6-8 hours. You can record the pain diary on a sheet of paper.  Write down the amount of relief from pain that you receive and how long the relief lasts after the injection. Describe your pain-  when it occurs, where it occurs, and describe things that make your pain worse.  3.  You may take pain medications right away if needed after you get home.    FOR ALL PROCEDURES:    Report any of the symptoms below to [facility] at [phone] or [phone].  After hours, your call will be answered by a service.  They will page one of the physicians.  If you are unable to reach a physician or if you experience shortness or breath or difficulty breathing, report to the nearest Emergency Department.      1. Excessive bleeding or drainage from the injection site.  2.  Persistent chills or a fever greater than 100 degrees.  3.  Major change in pain pattern or level.  4.  Loss of bowel or bladder control.  5.  New onset of numbness or weakness.  6.  Shortness of breath, difficulty breathing, severe nausea or vomiting, inability to urinate for 24 hours, increased swelling or itchiness.    7.  Severe headache.     NOTE - Routine Medication refills are not an emergency.    normal...

## 2020-02-13 NOTE — ED ADULT TRIAGE NOTE - CADM TRG TX PRIOR TO ARRIVAL
none
Ears: no ear pain and no hearing problems.Nose: no nasal congestion and no nasal drainage.Mouth/Throat: no dysphagia, no hoarseness and no throat pain.Neck: no lumps, no pain, no stiffness and no swollen glands.

## 2020-07-29 NOTE — BEHAVIORAL HEALTH ASSESSMENT NOTE - PATIENT'S CHIEF COMPLAINT
Alert-The patient is alert, awake and responds to voice. The patient is oriented to time, place, and person. The triage nurse is able to obtain subjective information. "I have aches all over"

## 2020-09-03 NOTE — BEHAVIORAL HEALTH ASSESSMENT NOTE - MOOD
Patient: Carmela Martines Date: 9/3/2020   : 1930    89 year old female      OUTPATIENT WOUND CARE PROGRESS NOTE    Supervising Wound Care / Hyperbaric Medicine Physician: Not Applicable  Consulting Provider:  Stefani Arreguin NP  Date of Consultation/Last Comprehensive Exam:  8/3/2020  Referring  Provider:  Dr. Gil Spencer    SUBJECTIVE:    Chief Complaint:  Blistering/burn to right thigh      Wound/Ulcer Present:    Other, burn    Additional Wound Category:  None     Maximum Baseline Ambulatory Status:  Ambulates with assistance and Walks with walker    History of Present Illness:  This is a 89 year old female recently admitted to [facility] on 20 with AMS.  Patient has h/o non obstructive CAD, HTN, dyslipidemia, chronic diastolic HF, COPD, myelodysplasia.  Has been experiencing increased right leg pain.  Due to the pain she was using a heating pad frequently.  She noticed blistering after using the heating pad, states she did not feel any pain to indicate the heating pad caused a burn of any sort.  The blisters are now causing her pain and she wants the blisters popped.    Therefore, unroofed during hospitalization.    Due to pain on her right side/hip,  Dr. Quintanilla was consulted, Xray completed: Radiographs of the pelvis demonstrated minor degenerative changes but no significant osteoarthritis of these.    Interval history 9/3/2020     Patient seen in wound clinic f/u for right lateral thigh wound secondary to burn from a heating pad. On home O2, no significant changes in breathing.  States she has a hard time laying down/flat for too long due to breathing and pain to her hip.       Denies fever, chills, nausea or vomiting.        Current Treatment Regimen:  Dressing:  Wound gel/Allevyn   Frequency:  Three times a week   Changed by:  Home care agency nurse    Review of Systems:  Pertinent items are noted in HPI (history of present illness).    Past Medical History:   Diagnosis Date   • Anemia     • Anxiety    • CAD (coronary artery disease)     med management, cath 2010   • Cholelithiasis    • CKD (chronic kidney disease)     Stage III   • COPD (chronic obstructive pulmonary disease) (CMS/Formerly [facility])    • Coronary atherosclerosis of native coronary artery 11/16/2012   • DA (degenerative arthritis)     severe djd b/l knees   • Depression    • Diastolic heart failure (CMS/Formerly [facility]) 10/28/2016   • Esophageal reflux    • Essential (primary) hypertension     Followed by Dr [facility] [phone]   • Fracture     Right ankle and left wrist  No surgery   • Full dentures    • Gout    • Hiatal hernia    • History of home oxygen therapy 2L O2 OXYGEN ONE    Followed by Dr [facility] [phone]   • History of tuberculosis exposure     as a child   • Hyperlipidemia    • Hypertension    • Hypothyroid    • Hypoxia     Followed by Dr [facility] [phone]   • Impaired mobility     Uses  Walker    • Insomnia    • Myelodysplasia 06/2010    increased ring sideroblasts/normal cytogenic profile   • Myelodysplasia, low grade (CMS/HCC) 4/1/2012   • Myocardial infarction (CMS/Formerly [facility])     Patient denies. States [place] and had [place] E[place] done and they said she once had a silent MI. No testing since.    • Obesity (BMI 30-39.9) 4/16/2015   • Osteoporosis    • Other chronic pain     Arthritis   • Other dyspnea and respiratory abnormality 11/16/2012    Followed by Dr [facility] [phone]   • Pneumonia    • Urinary incontinence     urge incontinence at times   • Urinary tract infection    • Vitamin B12 deficiency    • Vitamin D deficiency    • Wears glasses      Past Surgical History:   Procedure Laterality Date   • Appendectomy  age 41   • Back surgery  > 20years    Cervical fusion   • Eye surgery Right 7/12/2016    Cataract Extraction with IOL   • Hysterectomy   age 41    complete   • Knee arthroscopy w/ laser      Right   • Tonsillectomy and adenoidectomy       Social History     Socioeconomic History   • Marital status:      Spouse name: Not on file   • Number of children: Not on file   • Years of education: Not on file   • Highest education level: Not on file   Occupational History   • Not on file   Social Needs   • Financial resource strain: Not on file   • Food insecurity     Worry: Not on file     Inability: Not on file   • Transportation needs     Medical: Not on file     Non-medical: Not on file   Tobacco Use   • Smoking status: Never Smoker   • Smokeless tobacco: Never Used   Substance and Sexual Activity   • Alcohol use: No     Alcohol/week: 0.0 standard drinks     Frequency: Never     Drinks per session: 1 or 2     Binge frequency: Never   • Drug use: No   • Sexual activity: Not Currently   Lifestyle   • Physical activity     Days per week: Not on file     Minutes per session: Not on file   • Stress: Not on file   Relationships   • Social connections     Talks on phone: Not on file     Gets together: Not on file     Attends Mandaeism service: Not on file     Active member of club or organization: Not on file     Attends meetings of clubs or organizations: Not on file     Relationship status: Not on file   • Intimate partner violence     Fear of current or ex partner: Not on file     Emotionally abused: Not on file     Physically abused: Not on file     Forced sexual activity: Not on file   Other Topics Concern   •  Service Not Asked   • Blood Transfusions Not Asked   • Caffeine Concern Not Asked   • Occupational Exposure Not Asked   • Hobby Hazards Not Asked   • Sleep Concern Not Asked   • Stress Concern Not Asked   • Weight Concern Not Asked   • Special Diet Not Asked   • Back Care Not Asked   • Exercise Not Asked   • Bike Helmet Not Asked   • Seat Belt Yes   • Self-Exams Not Asked   Social History Narrative            8/17/2020:     Prior to  Hospital:    Baseline Functional Status:  Walker      Baseline Cognitive Status:  AO x 3        Primary Power of  for Health Care Agent: Michael Martines    Primary Agent Contact Number: [phone]    Alternate Power of  for Health Care Agent: Gisselle Heath    Alternate Agent Contact Number: [phone]        Activated Power of  for Health Care Date:      Deactivation Date:          Code Status:  No resuscitation         Social History: Lives alone in an independent senior apartment.           Family History   Problem Relation Age of Onset   • Cancer Mother         Breast   • Arthritis Mother    • Depression Mother    • Cancer Father         liver   • COPD Father    • Heart disease Father    • Stroke Father    • COPD Sister    • Arthritis Sister    • Cancer Sister    • Diabetes Daughter    • Asthma Other    • Diabetes Other    • Stroke/TIA Other        Current Outpatient Medications   Medication Sig   • acetaminophen-codeine (TYLENOL NO.3) 300-30 MG per tablet Take 1 tablet by mouth every 4 to 6 hours as needed for pain.   • vancomycin (VANCOCIN) 125 MG capsule Take 1 capsule by mouth 4 times daily for 7 days. Indications: Colon Inflammation due to Clostridium Bacteria Overgrowth Do not start before August 31, 2020.   • busPIRone (BUSPAR) 5 MG tablet Take 1 tablet by mouth 2 times daily.   • fluticasone (Flonase) 50 MCG/ACT nasal spray Spray 1 spray in each nostril daily. Indications: Allergic Rhinitis   • LORazepam (ATIVAN) 0.5 MG tablet Take 1 tablet by mouth daily as needed for Anxiety. Indications: Feeling Anxious   • pregabalin (LYRICA) 25 MG capsule Take 1 capsule by mouth 2 times daily. Indications: Neuropathic Pain   • Sodium Chloride Flush (normal saline flush) 0.9 % injectable solution Use 5 cc intravenously one time a day every 1 month(s) starting on the 1st for 1 day(s) for Unaccessed Implanted Port Flush Access implanted port with (R IJ 8F 23cm)and flush port using 5cc Normal  Saline (unless directed by Pharmacy or Physician). Receives at hematology   • silver sulfADIAZINE (THERMAZENE) 1 % cream Apply topically 3 days a week. Cleanse normal saline, skin prep ronen wound, apply silvadene, cover gauze border, change 3x week. Apply to a thickness of 1/16 inch (\"nickel thick\") To burn on right thigh. Follow up with  Wound care clinic   • Sodium Chloride 0.9 % Solution Use for wound cleansing   • acetaminophen (TYLENOL) 500 MG tablet Take 1 tablet by mouth 2 times daily as needed for Pain. Do not exceed 4000mg in 24hrs from all sources   • heparin (heparin) 100 UNIT/ML injection Use 5 ml intravenously one time a day every 1 month(s) starting on the 1st for 1 day(s) for Implanted Port - Unaccessed Flush implanted port with 5 ml heparin lock solution after normal saline flush. (unless directed by Pharmacy or Physician)- receives at hematology clinic   • Probiotic Product (Probiotic Daily) Cap Take 1 capsule by mouth 2 times daily.   • Menthol-Zinc Oxide (Remedy Calazime) 0.4-20.5 % Paste 1 application to sacral area 3x day   • acetaminophen (TYLENOL) 500 MG tablet Take 2 tablets by mouth 3 times daily. Do not exceed 4000mg in 24hrs from all sources   • calcium carbonate (TUMS) 500 MG chewable tablet Chew 1 tablet by mouth every 4 hours as needed for Heartburn.   • docusate sodium-sennosides (SENOKOT S) 50-8.6 MG per tablet Take 2 tablets by mouth daily as needed for Constipation.   • epoetin elijah-epbx (RETACRIT) 72572 UNIT/ML injection Inject 1.5 mLs into the skin 1 day a week. Do not start before August 12, 2020. Normally goes to [facility] for this   • filgrastim-sndz, G-CSF, (ZARXIO) 480 MCG/0.8ML injection Inject 0.8 mLs into the skin 1 day a week. Do not start before August 12, 2020. Normally goes to [facility] for this   • polyethylene glycol (MIRALAX) 17 g packet Take 17 g by mouth daily as needed (constipation). Stir and dissolve powder in any 4 to 8  ounces of beverage, then drink.   • melatonin 3 MG Take 1 tablet by mouth nightly as needed (insomnia).   • furosemide (LASIX) 40 MG tablet Take 1 tablet by mouth 2 times daily.   • pravastatin (PRAVACHOL) 10 MG tablet TAKE 0.5 TABLET BY MOUTH DAILY   • allopurinol (ZYLOPRIM) 100 MG tablet TAKE 1 TABLET BY MOUTH DAILY.   • losartan (COZAAR) 50 MG tablet Take 1 tablet by mouth daily.   • pramipexole (MIRAPEX) 0.5 MG tablet TAKE 1 TABLET BY MOUTH TWICE DAILY   • levothyroxine (SYNTHROID, LEVOTHROID) 100 MCG tablet TAKE 1 TABLET BY MOUTH DAILY   • aspirin (ASPIR-81) 81 MG EC tablet Take 1 tablet by mouth daily.     No current facility-administered medications for this encounter.         ALLERGIES:  Morphine, Atorvastatin, Iodine   (environmental or med), Sulfa antibiotics, Tetanus toxoid, and Vicodin [hydrocodone-acetaminophen]    OBJECTIVE:  Vital Signs:    Visit Vitals  /59 (BP Location: LUE - Left upper extremity, Patient Position: Sitting)   Pulse 85   Temp 98.8 °F (37.1 °C) (Temporal)   Resp 16   Ht 5' (1.524 m)   Wt 73.9 kg   BMI 31.83 kg/m²         Physical Exam:  General appearance: Appears stated age, oriented to person, place, time and situation and in no distress  Head:   normocephalic without obvious abnormality  Eye:  Conjunctivae/sclerae normal. No erythema, edema or exudate.  Neurologic:  Gait: limited, right leg weakness  Extremities: See below     Right lateral thigh with only one remaining open wound (previously there were 2).  Wound with dry crusting at base.  Debridement deferred due to pain.  No fluctuance or drainage.  Slight erythema to ronen wound, no warmth or evidence of infection.    9/3/2020 right lateral thigh            Wound Bed Quality:  As above      Ronen-wound Quality:    None    Additional Descriptors:  none    Wound Measurements Per Flowsheet:       Wound Thigh Right Upper Blister (Active)   Wound Care Team Consult Date 09/03/20 09/03/20 1100   Wound Length (cm) 2.1 cm 09/03/20  1100   Wound Width (cm) 2.6 cm 09/03/20 1100   Wound Surface Area (cm^2) 5.46 cm^2 09/03/20 1100   Number of days: 34       Wound Breast Right Skin fold Moisture Associated Skin Damage (MASD) (Active)   Number of days: 34       Wound Buttock  Friction/Shear (Active)   Number of days: 34       Wound Back Left Posterior Puncture (Active)   Number of days: 28         PROCEDURE:  None performed  Not indicated    Procedure was Performed by:  Not applicable     Laboratory assessments reviewed:  No results found for: PAB   Albumin (g/dL)   Date Value   08/20/2020 3.6 (L)   08/01/2020 3.2 (L)   07/31/2020 3.4 (L)   05/06/2020 4.1   09/30/2019 4.1   04/03/2019 3.7      No results available in last 24 hours    Lab Results   Component Value Date    WBC 5.2 09/02/2020    GLUCOSE 88 08/25/2020    HGBA1C 4.5 10/13/2016    CRP 0.4 02/26/2019    RESR 50 (H) 02/26/2019    CREATININE 0.85 08/25/2020    GFRA >60 08/25/2020    GFRNA >60 08/25/2020        Culture results:  Specimen Description (no units)   Date Value   08/18/2020 Stool   03/09/2017 URINE, CLEAN CATCH/MIDSTREAM   03/09/2017 BLOOD, PERIPHERAL R AC   03/09/2017 BLOOD, PERIPHERAL PORT    CULTURE (no units)   Date Value   08/18/2020     Negative for Salmonella, Shigella, Campylobacter, and Yersinia. No Shiga-like toxin 1 and Shiga-like toxin 2 DNA detected. Shiga-like toxin detection was performed using the [facility] real-time Bacterial Panel 2 (EBBP2A) Assay.   03/09/2017     10,000 TO 50,000 CFU/mL MIXED BACTERIAL JUANA WITH NO PREDOMINATING TYPE   03/09/2017 NO GROWTH 5 DAYS.   03/09/2017 NO GROWTH 5 DAYS.        Diagnostic Assessments Reviewed:  CT Scan  Right leg 7/31/20      IMPRESSION:     Moderate amount of circumferential soft tissue edema, inflammation, or  potentially cellulitis at the level of the ankle and distal lower leg  without drainable fluid collection. There is no soft tissue gas identified.    Nutritional Assessment:  Prealbumin and/or Albumin  reviewed    Wound treatment goals are palliative:  No    DIAGNOSES:  Second degree burn right lateral thigh   Chronic pain    Medical Decision Making:   Right lateral thigh with second degree burns due to overuse of heating pad.   One of the wounds noted to have healed.  Remaining wound with dry base.    Wound care:  Medihoney topically, change 3x/week by [facility]    Offloading:  Avoid laying on right side when in bed.    Nutrition:  Increase protein intake    Infection:  Right lateral thigh without evidence of infection      Plan of Care:  Advanced Wound Care Recommendations:  See above  Percent Wound Closure from consult:  N/A, new consult 8/3/20  Care plan to augment wound closure:  Not applicable.  See above    Portions of this note are brought forward from Lilian Abraham's note; reviewed and edited by me as appropriate.      RTC in 3 weeks for Provider visit.    Stefani Arreguin NP  Hyperbaric and Wound Care  [phone]  (o)  [phone]  (p)     Normal

## 2020-12-09 NOTE — ED STATDOCS - CONDITION AT DISCHARGE:
The pt came into the clinic for complaints of numbness and tingling in his left hand that has been going on for the last few months. While at the clinic the was found to be hypertensive with a BP in the 230s. 207/134. Pt states that he has not any medical care recently and does not recall what his BP normally is. The pt is A&Ox4 with skin pink warm and dry. Pt denies any CP, SOB,dizzness, nausea, vomiting, headache, blurred vision or any pain. Stroke scale is negative. Lung sounds clear. 12 lead shows sinus tach with no ST changes.    [facility] called to transport the pt to the ED for further eval. Upon EMS arrival pt report and care is handed off.   Improved

## 2021-01-04 NOTE — BRIEF OPERATIVE NOTE - URINE OUTPUT
Subjective       History of Present Illness  Katia Puente is a 51 y.o. female is being seen today for postop evaluation of her posterior repair  Patient states she is done well.  She has had very little constipation she is not had any bleeding.  Stamina is bouncing back to normal.  They found a small area on her adrenal gland that might be a dilated splenic artery.  She has an appointment with vascular surgery she is totally asymptomatic from that.  Chief Complaint   Patient presents with   • Post-op     Post op, Posterior vaginal repair rectocele 2020   .        The following portions of the patient's history were reviewed and updated as appropriate: allergies, current medications, past family history, past medical history, past social history, past surgical history and problem list.    PAST MEDICAL HISTORY  Past Medical History:   Diagnosis Date   • Allergic    • Anxiety    • Breast pain, left    • Elevated cholesterol    • Heart murmur     AS CHILD   • Hemorrhoids 2012   • History of 2019 novel coronavirus disease (COVID-19)    • Hypothyroidism    • Rectocele 10/26/2020   • Vaginal atrophy      OB History    Para Term  AB Living   5 2 2   3     SAB TAB Ectopic Molar Multiple Live Births   3                # Outcome Date GA Lbr Richie/2nd Weight Sex Delivery Anes PTL Lv   5 SAB            4 SAB            3 SAB            2 Term            1 Term              Past Surgical History:   Procedure Laterality Date   • ANTERIOR AND POSTERIOR VAGINAL REPAIR N/A 2020    Procedure: POSTERIOR VAGINAL REPAIR;  Surgeon: Rene Kaur MD;  [facility];  Service: Obstetrics/Gynecology;  Laterality: N/A;   • BACK SURGERY      L4, L5 discectomy   • BACK SURGERY  2019       • BREAST SURGERY     • CHOLECYSTECTOMY     • COLONOSCOPY     • ENDOSCOPY  2013   • GALLBLADDER SURGERY     • HEMORRHOIDECTOMY     • HYSTERECTOMY      VAGINAL   • LASIK     •  TONSILLECTOMY       Family History   Problem Relation Age of Onset   • Heart disease Mother    • Heart disease Father    • Diabetes Paternal Aunt    • Stroke Maternal Grandmother    • Cancer Paternal Grandfather    • Malig Hyperthermia Neg Hx      Social History     Tobacco Use   Smoking Status Never Smoker   Smokeless Tobacco Never Used       Current Outpatient Medications:   •  ALPRAZolam (XANAX) 0.5 MG tablet, Take 0.5 mg by mouth At Night As Needed., Disp: , Rfl:   •  ARMOUR THYROID 15 MG tablet, Take 15 mg by mouth Daily., Disp: , Rfl:   •  ARMOUR THYROID 60 MG PO tablet, Take 60 mg by mouth Daily., Disp: , Rfl:   •  BD Pen Needle Flavia U/F 32G X 4 MM misc, See Admin Instructions., Disp: , Rfl:   •  docusate sodium (Colace) 100 MG capsule, Take 1 capsule by mouth 2 (Two) Times a Day., Disp: 60 capsule, Rfl: 1  •  Fish Oil-Cholecalciferol (FISH OIL + D3 PO), Take 1 tablet/day by mouth Daily. HOLDING FOR SURGERY, Disp: , Rfl:   •  IRON PO, Take 1 tablet by mouth Daily. HOLDING FOR SURGERY, Disp: , Rfl:   •  multivitamin with minerals (MULTIVITAMIN ADULTS PO), Take 1 tablet by mouth Daily. HOLDING FOR SURGERY, Disp: , Rfl:   •  Victoza 18 MG/3ML solution pen-injector injection, INJECT .6 WEEK 1,THEN 1.2 WEEK 2,THEN 1.8 ON WEEK 3, Disp: , Rfl:   •  vitamin E 400 UNIT capsule, Take 400 Units by mouth Daily. HOLDING FOR SURGERY, Disp: , Rfl:   •  VYVANSE 50 MG capsule, Take 50 mg by mouth Daily HOLD FOR 48HOURS PRIOR TO SURGERY , Disp: , Rfl: 0  •  oxyCODONE-acetaminophen (Percocet) 5-325 MG per tablet, Take 1 tablet by mouth Every 6 (Six) Hours As Needed for Moderate Pain ., Disp: 14 tablet, Rfl: 0  •  promethazine (PHENERGAN) 25 MG tablet, Take 1 tablet by mouth Every 6 (Six) Hours As Needed for Nausea or Vomiting., Disp: 14 tablet, Rfl: 2  •  rosuvastatin (CRESTOR) 5 MG tablet, Take 5 mg by mouth Every Night., Disp: , Rfl:     There is no immunization history on file for this patient.    Review of Systems        Except as outlined in history of physical illness, patient denies any changes in her GYN, , GI systems. All other systems reviewed are negative.    Objective   Physical Exam   Alert and oriented, respirations unlabored, heart regular rate and rhythm   Pelvic external genitalia normal vagina is healing nicely, she has excellent support, introitus has good musculature.  There are the very small remnants of the external suture tag that were trimmed to the mucosa.  Bimanual exam was otherwise normal with excellent vaginal support and she is healing nicely      Assessment/Plan   Diagnoses and all orders for this visit:    1. Postop check (Primary)    Healing very well, we had a lengthy talk about holding off on sex relations tampons in her course and douching for couple more weeks.  Talked about the importance of minimizing physical activity and doing what ever it takes to avoid constipation for the next 2 to 3 months.  She does resume sexual relations, stressed the importance of foreplay, maybe orgasm without penetration over the next month or so.  And to go very slow once they decide to have vaginal intercourse.             No orders of the defined types were placed in this encounter.          EMR Dragon/ Transcription disclaimer:  Much of the encounter note is an electronic transcription/translation of spoken language to printed text. The electronic translation of spoken language may permit erroneous, or at times, nonessential words or phrases to be inadvertently transcribes; Although i have reviewed the note for such errors, some may still exist.   1500

## 2021-01-14 NOTE — ED ADULT NURSE NOTE - FALL HARM RISK TYPE OF ASSESSMENT
Consultation - [facility]  Chaya Martins 47 y o  male MRN: [id number]  Unit/Bed#:  Encounter: [id number]        Consults    Reason for Consult / Principal Problem:     Patient has presented for a screening colonoscopy  Patient has occasional symptoms of hemorrhoids with swelling and itching in the rectal area  There is no family history of colon cancer  Patient's father has history of prostate cancer  He denies any significant changes in his bowel habits  ASSESSMENT AND PLAN:      Patient has history of hemorrhoids  Screening colonoscopy is indicated  Otherwise patient is healthy     ______________________________________________________________________    HPI:  He denies any significant weight loss or weight gain  He denies any fevers or chills  Denies any significant constitutional symptoms  He is overall healthy  He has no history of hospitalization or surgeries  We discussed regarding appropriate high-fiber diet        REVIEW OF SYSTEMS:    CONSTITUTIONAL: Denies any fever, chills, rigors, and weight loss  HEENT: No earache or tinnitus  Denies hearing loss or visual disturbances  CARDIOVASCULAR: No chest pain or palpitations  RESPIRATORY: Denies any cough, hemoptysis, shortness of breath or dyspnea on exertion  GASTROINTESTINAL: As noted in the History of Present Illness  GENITOURINARY: No problems with urination  Denies any hematuria or dysuria  NEUROLOGIC: No dizziness or vertigo, denies headaches  MUSCULOSKELETAL: Denies any muscle or joint pain  SKIN: Denies skin rashes or itching  ENDOCRINE: Denies excessive thirst  Denies intolerance to heat or cold  PSYCHOSOCIAL: Denies depression or anxiety  Denies any recent memory loss         Historical Information   Past Medical History:   Diagnosis Date    Hyperlipidemia      Past Surgical History:   Procedure Laterality Date    VASECTOMY N/A 2006     Social History   Social History     Substance and Sexual Activity   Alcohol Use Yes    Frequency: 2-4 times a month    Drinks per session: 1 or 2    Comment: Social     Social History     Substance and Sexual Activity   Drug Use Never     Social History     Tobacco Use   Smoking Status Never Smoker   Smokeless Tobacco Never Used     Family History   Problem Relation Age of Onset    Prostate cancer Father        Meds/Allergies     (Not in a hospital admission)    Current Facility-Administered Medications   Medication Dose Route Frequency    polyethylene glycol (GLYCOLAX) bowel prep 238 g  238 g Oral Once       No Known Allergies        Objective     Blood pressure 126/82, pulse 55, height 5' 11" (1 803 m), weight 92 5 kg (204 lb)  Body mass index is 28 45 kg/m²  [unfilled]      PHYSICAL EXAM:      General Appearance:   Alert, cooperative, no distress   HEENT:   Normocephalic, atraumatic, anicteric      Neck:  Supple, symmetrical, trachea midline   Lungs:   Clear to auscultation bilaterally; no rales, rhonchi or wheezing; respirations unlabored    Heart[de-identified]   Regular rate and rhythm; no murmur, rub, or gallop  Abdomen:   Soft, non-tender, non-distended; normal bowel sounds; no masses, no organomegaly    Genitalia:   Deferred    Rectal:   Deferred    Extremities:  No cyanosis, clubbing or edema    Pulses:  2+ and symmetric all extremities    Skin:  No jaundice, rashes, or lesions    Lymph nodes:  No palpable cervical lymphadenopathy        Lab Results:   No visits with results within 1 Day(s) from this visit  Latest known visit with results is:   No results found for any previous visit  Imaging Studies: I have personally reviewed pertinent imaging studies  Daily Assessment

## 2021-02-17 NOTE — ED PROVIDER NOTE - NSTIMEPROVIDERCAREINITIATE_GEN_ER
Pt  GEE        HAS  LUPUS     HAS   OTHER   FRIENDS  WITH  LUPUS  THAT  SEE  KATTY  AND  SAID  HE IS  SO  GOOD  WITH  LUPUS  PT   WOULD  LIKE  TO  CHANGE  TO HIM   I  EXPLAIN  HE  IS  SEMI  RETIRED   BUT  SHE  IS  OK  WITH  THAT   IS  THAT OK     CALL PT  [phone] 12-Apr-2018 22:03

## 2021-03-16 NOTE — PROGRESS NOTE BEHAVIORAL HEALTH - ABNORMAL MOVEMENTS
No abnormal movements
No abnormal movements
Please order dilaudid 1 mg/ml for next fill. Med change. 
No abnormal movements

## 2021-06-29 NOTE — ED ADULT NURSE NOTE - PAIN: BODY LOCATION
[facility] ENCOUNTER  GI CONSULT NOTE    REQUESTING PROVIDER:  Michael D D'Amico, MD    CHIEF COMPLAINT:  Consultation (dysphagia)       SUBJECTIVE:    Caleb Isabel is a 47 year old male seen today at the request of Michael D D'Amico, MD for evaluation of complaints of dysphagia.  Patient says that he has been having progressive symptoms of dysphagia over the last few months primarily to solid foods initially to meats and other solid foods and now progressing to even softer foods like donuts as noted.  He does have longstanding history of GERD although patient is not on regular PPI therapy he says that he takes over-the-counter H2 blocker or Tums for his heartburn symptoms.  Denies any associated weight loss.  Patient says that the dysphagia is significant and he feels that food gets stuck in the lower part of his esophagus at which time he has to drink a lot of water to push it down or sometimes has to regurgitate the food back up as noted.  Denies any overt GI bleeding.  No hematemesis melena coffee-ground emesis hematochezia or bright red bleeding per rectum as noted.  Patient denies any significant use of NSAIDs at this time.  He also denies any family history of GI or liver related malignancies or pathologies including no family history of inflammatory bowel disease namely Crohn's disease or ulcerative colitis as noted.  Patient says that otherwise has been at his baseline state of health as noted..    MEDICATIONS:       Current Outpatient Medications   Medication Sig Dispense Refill   • cephalexin (KEFLEX) 500 MG capsule Take 1 capsule by mouth 4 times daily for 14 days. 56 capsule 0   • HYDROcodone-acetaminophen (NORCO) 5-325 MG per tablet Take 1-2 tablets by mouth every 4 to 6 hours as needed for pain. 30 tablet 0   • Brexpiprazole (Rexulti) 1 MG Tab Take 1 mg by mouth nightly.      • busPIRone (BUSPAR) 15 MG tablet Take 15 mg by mouth 2 times daily.     • gabapentin (NEURONTIN) 300 MG capsule Take  300 mg by mouth 3 times daily.     • hydrOXYzine (ATARAX) 25 MG tablet Take 25 mg by mouth 2 times daily as needed.     • lamoTRIgine (LaMICtal) 100 MG tablet Take 100 mg by mouth nightly.      • Trintellix 10 MG tablet Take 10 mg by mouth nightly.      • Multiple Vitamins-Minerals (MULTIVITAMIN & MINERAL PO) Take 1 tablet by mouth daily.       No current facility-administered medications for this visit.       PROBLEM LIST:                  Patient Active Problem List   Diagnosis   • Anxiety   • Thoracic outlet syndrome   • Umbilical hernia   • Elevated liver enzymes   • Anabolic steroid abuse   • Mass of left upper extremity   • Non-healing non-surgical wound   • Nonhealing surgical wound, subsequent encounter   • Thrombocytosis (CMS/HCC)       HISTORIES:    ALLERGIES:   Allergen Reactions   • Seasonal Other (See Comments) and Runny Nose     Hay fever, itchy eyes     Past Medical History:   Diagnosis Date   • Abscess of arm     Recurrent due to steroid injections   • Allergic rhinitis due to pollen    • GERD (gastroesophageal reflux disease)    • History of obstructive sleep apnea 02/14/2017    mild   • Impingement syndrome of left shoulder 2/28/2017   • Insomnia    • Keratosis    • Opioid dependence (CMS/HCC)    • Panic disorder    • Rotator cuff injury     right s/p surgery in September 2012   • Thoracic outlet syndrome     right   • Umbilical hernia    • Wears contact lenses     and prescription glasses intermittently     Past Surgical History:   Procedure Laterality Date   • Ct guided needle placement Left 3/23/2021    Left upper arm Mass   • Extremity cyst excision Left 06/22/2021    LEFT ARM CYST EXCISION, POSSIBLE WOUND VAC by Dr. Ruiz at [facility]   • Scalenotomy Right    • Shoulder surgery Right    • Spinal fusion  2006    BOTH anterior and posterior lumbar   • Vein surgery      left leg laser surgery for varicose veins     Social History     Tobacco Use   • Smoking status: Never Smoker   • Smokeless  tobacco: Never Used   Vaping Use   • Vaping Use: never used   Substance Use Topics   • Alcohol use: Yes     Alcohol/week: 0.0 standard drinks     Comment: RARELY   • Drug use: No        Family History   Problem Relation Age of Onset   • Patient is unaware of any medical problems Mother    • Parkinsonism Father    • Dementia/Alzheimers Father    • Patient is unaware of any medical problems Sister    • Diabetes Sister    • Patient is unaware of any medical problems Brother    • Suicide Brother    :    REVIEW OF SYSTEMS:    All other systems are reviewed and are negative except as documented in the history of present illness.    PHYSICAL EXAM:    Vital Signs: Blood pressure 124/82, height 6' (1.829 m), weight 103 kg.  General: The patient is well developed, well nourished, in no acute distress, appears stated age.   Neurologic: Alert. Normal mood and affect, normal speech, no gross tremor.  Skin: Warm and dry, normal turgor.  Head: Normocephalic.  Eyes: Normal conjunctivae and sclerae. Extraocular movements intact.  HEENT: Oropharynx clear, moist mucous membranes, external nose is normal to inspection.  Neck: Symmetric without swelling or tenderness.   Respiratory: Respiratory effort normal.   Cardiovascular: Regular rate and rhythm.  Extremities: No swelling or tenderness.  Left upper extremity wound with bandage is noted.  Gastrointestinal:  Soft and nontender.  Normal bowel sounds.  No hepatomegaly or splenomegaly.       LABORATORY DATA:    All pertinent laboratory results were reviewed.  Lab Results   Component Value Date    WBC 11.7 (H) 06/15/2021    WBC 11.4 (H) 03/19/2018    HCT 41.4 06/15/2021    HCT 34.5 (L) 03/19/2018    HGB 13.1 06/15/2021    HGB 10.7 (L) 03/19/2018     (H) 06/15/2021     (H) 03/19/2018     Lab Results   Component Value Date    GLUCOSE 107 (H) 06/15/2021    GLUCOSE 111 (H) 03/19/2018    SODIUM 135 06/15/2021    SODIUM 134 (L) 03/19/2018    POTASSIUM 5.0 06/15/2021    POTASSIUM  5.0 03/19/2018    CHLORIDE 102 06/15/2021    CHLORIDE 97 (L) 03/19/2018    BUN 29 (H) 06/15/2021    BUN 21 (H) 03/19/2018    CREATININE 1.22 (H) 06/15/2021    CREATININE 1.19 03/19/2018    CALCIUM 10.3 (H) 06/15/2021    CALCIUM 10.0 03/19/2018    ALBUMIN 2.5 (L) 03/19/2018    ALBUMIN 2.6 (L) 03/12/2018    AST 46 (H) 03/19/2018    AST 98 (H) 03/12/2018    ALKPT 162 (H) 03/19/2018    ALKPT 177 (H) 03/12/2018    GPT 24 03/19/2018    GPT 37 03/12/2018    ANIONGAP 14 06/15/2021    ANIONGAP 13 03/19/2018    BCRAT 24 06/15/2021    BCRAT 18 03/19/2018    GLOB 4.4 (H) 11/17/2015    GLOB 3.5 09/09/2015    AGR 0.7 (L) 03/19/2018    AGR 0.6 (L) 03/12/2018       IMAGING STUDIES:     Chest x-ray dated 06/15/2021 results reviewed -  FINDINGS:   No focal consolidation. No pneumothorax. No sizeable pleural effusion. The  cardiomediastinal silhouette is within normal limits. No acute osseous  findings.    ENDOSCOPY:  EGD 2019 results reviewed with patient as noted    ASSESSMENT:       Caleb was seen today for consultation.    Diagnoses and all orders for this visit:    Esophageal dysphagia - patient with significant esophageal dysphagia primarily to solid foods that has been progressive without any associated alarm GI symptoms or food bolus impaction history.  Although patient does have to regurgitate his food at occasions and is getting worse.  Concern of worsening hiatal hernia versus peptic stricture worse is erosive esophagitis as patient is not on scheduled PPI therapy.  Did discuss with patient and will plan on urgent EGD to evaluated further and treat as noted.  If no clear etiology identified and symptoms persist might need to consider esophagram and high-resolution esophageal manometry in the future also.  Patient was told to stay on soft well chewed diet in the interim with small bites and adequate fluids in between the boluses as noted.    Gastroesophageal reflux disease, unspecified whether esophagitis present - patient  also has longstanding history of GERD although has not been on regular PPI therapy was noted to have a small hiatal hernia in the past.  No significant history of peptic ulcer disease or esophagitis.  Was told to continue with dietary and lifestyle related reflux precautions avoid NSAIDs at all times depending on finding an EGD will decide on long-term PPI therapy and titration of medications as needed.    Hiatal hernia - as noted on previous EGD small size hiatal hernia will evaluate further at the time of EGD and depending on findings might need to decide if consider medical management or surgical treatment in the future also.        PLAN:     follow up with NP/PA PRN   Instructions provided as documented in the after visit summary.    The patient indicated understanding of the diagnosis and agreed with the plan of care.           Schedule Procedure:     Please Schedule Emergent (within 1 week)  Procedure: EGD (60777)  Diagnosis: Dysphagia R13.10  Is patient:    Diabetic? No   On Coumadin? No   On ASA/NSAIDS? No  Prophylactic Antibiotics? No  Location: [address]  Special Instructions:   MAC Anesthesia  Prepare consent for procedure: Consent for Upper Endoscopy with possible biopsy and/or dilatation              A copy of this note was sent to the referring provider. Thank you for involving me   in the care of this patient.     knee/Right:

## 2021-10-25 NOTE — ED STATDOCS - DISPOSITION TYPE
CHIEF COMPLAINT: SOB    Interval Events: No acute events this AM, patient resting comfortably.    REVIEW OF SYSTEMS:  Constitutional: No fevers or chills.  Eyes: No itching or discharge from the eyes  ENT: No ear pain. No ear discharge.   CV: No chest pain. No palpitations. No lightheadedness or dizziness.   Resp: No dyspnea at rest. No cough.  GI: No nausea. No vomiting. No diarrhea.  MSK: No joint pain or pain in any extremities  Integumentary: No skin lesions. +Pedal edema.  Neurological: No gross motor weakness. No sensory changes.  [x] All other systems negative  [ ] Unable to assess ROS because ________    OBJECTIVE:  ICU Vital Signs Last 24 Hrs  T(C): 37.1 (25 Oct 2021 05:00), Max: 37.1 (25 Oct 2021 05:00)  T(F): 98.8 (25 Oct 2021 05:00), Max: 98.8 (25 Oct 2021 05:00)  HR: 92 (25 Oct 2021 08:25) (83 - 95)  BP: 126/61 (25 Oct 2021 05:00) (119/71 - 132/82)  BP(mean): --  ABP: --  ABP(mean): --  RR: 20 (25 Oct 2021 08:09) (18 - 21)  SpO2: 93% (25 Oct 2021 08:25) (84% - 95%)        10-24 @ 07:01  -  10-25 @ 07:00  --------------------------------------------------------  IN: 0 mL / OUT: 900 mL / NET: -900 mL      CAPILLARY BLOOD GLUCOSE          PHYSICAL EXAM:  General: Awake, alert, oriented X 3.   HEENT: Atraumatic, normocephalic. HFNC in place.  Lymph Nodes: No palpable lymphadenopathy  Neck: Supple.  Respiratory: Normal chest expansion. Clear anteriorly.  Cardiovascular: S1 S2 normal. No murmurs, rubs or gallops.   Abdomen: Soft, non-tender, non-distended. No organomegaly.  Extremities: Warm to touch. Peripheral pulse palpable. +Pedal edema. +Clubbing.  Skin: No rashes or skin lesions  Neurological: Motor and sensory examination equal and normal in all four extremities.  Psychiatry: Appropriate mood and affect.    HOSPITAL MEDICATIONS:  MEDICATIONS  (STANDING):  albuterol/ipratropium for Nebulization 3 milliLiter(s) Nebulizer every 4 hours  budesonide 160 MICROgram(s)/formoterol 4.5 MICROgram(s) Inhaler 2 Puff(s) Inhalation two times a day  cyanocobalamin 1000 MICROGram(s) Oral daily  enoxaparin Injectable 40 milliGRAM(s) SubCutaneous daily  furosemide   Injectable 40 milliGRAM(s) IV Push <User Schedule>  influenza   Vaccine 0.5 milliLiter(s) IntraMuscular once  multivitamin 1 Tablet(s) Oral daily  predniSONE   Tablet 50 milliGRAM(s) Oral two times a day  sodium chloride 0.65% Nasal 1 Spray(s) Both Nostrils every 4 hours  tiotropium 18 MICROgram(s) Capsule 1 Capsule(s) Inhalation daily    MEDICATIONS  (PRN):  metoclopramide 10 milliGRAM(s) Oral every 6 hours PRN nausea  morphine   Solution 2.5 milliGRAM(s) Oral every 6 hours PRN dyspnea or pain  sodium chloride 0.65% Nasal 1 Spray(s) Both Nostrils once PRN Nasal Congestion      LABS:                        13.6   15.90 )-----------( 240      ( 25 Oct 2021 07:13 )             42.3     10-25    141  |  95<L>  |  35<H>  ----------------------------<  106<H>  3.7   |  33<H>  |  0.93    Ca    10.0      25 Oct 2021 07:13  Phos  3.8     10-25  Mg     2.30     10-25    TPro  6.4  /  Alb  3.8  /  TBili  0.8  /  DBili  x   /  AST  59<H>  /  ALT  84<H>  /  AlkPhos  168<H>  10-25              MICROBIOLOGY:     RADIOLOGY:  [ ] Reviewed and interpreted by me    Point of Care Ultrasound Findings:    PFT:    EKG: DISCHARGE

## 2021-12-09 NOTE — PATIENT PROFILE ADULT. - SPIRITUAL CULTURAL, RELIGIOUS PRACTICES/VALUES, PROFILE
Contacted patient in regards to below message. Patient notes that she uses the Lexapro 20mg and the pharmacy sent an auto refill on the Lexapro 10mg. Patient states that was an Pharmacy glitch and that she is okay on medication.      Please advise
Last Fill Date: 11/30/21  R:1  Last OV:11/15/21  Next OV:1/3/22  Plan Of Care:Return in about 6 weeks (around 12/27/2021) for chronic disease management
none

## 2022-01-04 NOTE — PATIENT PROFILE ADULT. - NS PRO CONTRA FLU CONTRAIND
Abdomen , soft, nontender, nondistended , no guarding or rigidity , no masses palpable , normal bowel sounds , Liver and Spleen , no hepatomegaly present , no hepatosplenomegaly , liver nontender , spleen not palpable
none

## 2022-01-24 NOTE — DISCHARGE NOTE ADULT - FUNCTIONAL SCREEN CURRENT LEVEL: DRESSING, MLM
(0) independent Detail Level: Generalized Include Location In Plan?: Yes Hide Include Location In Plan Question?: No

## 2022-04-06 NOTE — PROGRESS NOTE BEHAVIORAL HEALTH - NS ED BHA MED ROS EYES
Pt resting quietly on stretcher with eyes closed, responds to verbal stimuli. Pt remains on continuous cardiac and pulse ox monitoring with non-invasive blood pressure to cycle every 30 minutes. VS stable; A-Fib noted with a heart rate of 112. Pt denies pain at this time but reports a funny feeling in his head rated 1/10; no acute distress or discomfort reported or observed. Pt denies restroom needs at this time; is able to reposition self on stretcher. Bed locked in lowest position; side rails up and locked x 2; call light, bedside table, and personal belongings within reach. Room assessed for safety measures and cleanliness; no action needed at this time. Plan of care discussed. Pt instructed to alert nurse for assistance and before attempting to get out of bed; verbalizes understanding. Pt denies needs or complaints at this time; will continue to monitor.       
No complaints

## 2022-09-01 NOTE — PATIENT PROFILE BEHAVIORAL HEALTH - CAREGIVER OBTAIN DETAILS
uncooperative; refused to answer Imiquimod Counseling:  I discussed with the patient the risks of imiquimod including but not limited to erythema, scaling, itching, weeping, crusting, and pain.  Patient understands that the inflammatory response to imiquimod is variable from person to person and was educated regarded proper titration schedule.  If flu-like symptoms develop, patient knows to discontinue the medication and contact us.

## 2022-11-02 NOTE — ED ADULT NURSE NOTE - TEMPLATE
"Speech Language Pathology  Daily Treatment     Patient Name: Asmita Willis  Age:  67 y.o., Sex:  female  Medical Record #: [id number]  Today's Date: 11/2/2022     Precautions  Precautions: Fall Risk, Spinal / Back Precautions , Swallow Precautions ( See Comments)    HPI: 67 y.o. woman admitted on 10/30/22 for severe sepsis, DKA and hematemesis; esophagitis/?thrush.     Assessment    Patient seen this date for dysphagia management. Patient reports meals have been going \"okay.\" Patient positioned herself to EOB.    PO presentations of TN0 and LQ3. Adequate oral bolus acceptance/containment, complete and timely AP transfer. No cough/throat clear appreciated with PO. Vocal quality remained stable throughout assessment. Single swallow completed per bolus. Patient grimacing in pain and stated 9/10 pain when clinician inquired. Discontinued session with no trials of advanced consistencies r/t pain and patient request. Eructation x1 with no coughing with TN0 straw sip.     Discussed with RN who states patient will be ordered medication to treat suspected thrush this date. Messaged Dr. Tobar on [facility] regarding possible GI referral for esophageal dysphagia.     Recommendations  Liquidized solids with thin liquids (clear for cracker snacks per patient)  Instrumentation: Instrumental swallow study pending clinical progress  Swallowing Instructions & Precautions:   Supervision: Monitor during meals (check on patient 2-3 times)  Positioning: Fully upright and midline during oral intake, Remain upright for 30 minutes after oral intake  Medication: Float small and crush large pills in applesauce as needed/able.   Strategies: Small bites/sips, Alternate bites and sips, Slow rate of intake  Oral Care: BID  4. Consider Esophagram given s/sx of esophageal dysphagia if medically appropriate  5. Following for dysphagia management      Plan    Continue current treatment plan.    Discharge Recommendations: (P) Recommend home health for " "continued speech therapy services     Objective       11/02/22 1101   Charge Group   SLP Swallowing Dysfunction Treatment Swallowing Dysfunction Treatment   Total Treatment Time   SLP Time Spent Yes   SLP Swallowing Dysfunction Treatment (Mins) 10   SLP Total Time Spent 10   Precautions   Precautions Fall Risk;Spinal / Back Precautions ;Swallow Precautions ( See Comments)   Vitals   O2 (LPM) 3   O2 Delivery Device Silicone Nasal Cannula   Pain 0 - 10 Group   Therapist Pain Assessment 9;Nurse Notified;During Activity   Dysphagia    Dysphagia X   Diet / Liquid Recommendation Thin (0);Liquidised (3) - (Nectar Thick Full Liquid)   Nutritional Liquid Intake Rating Scale Non thickened beverages   Nutritional Food Intake Rating Scale Total oral diet of a single consistency   Nursing Communication Swallow Precaution Sign Posted at Head of Bed   Skilled Intervention Verbal Cueing   Recommended Route of Medication Administration   Medication Administration  Float Whole with Puree;Other (See Comments)  (Crush large pills in applesauce as needed. NO PUREE)   Patient / Family Goals   Patient / Family Goal #1 \"It's terrible not being able to eat\"   Goal #1 Outcome Progressing slower than expected   Short Term Goals   Short Term Goal # 1 Pt will consume a diet of liquidized/thins with no s/sx of aspiration and min cues.   Goal Outcome # 1 Progressing as expected   Education Group   Education Provided Dysphagia   Dysphagia Patient Response Patient;Acceptance;Explanation;Verbal Demonstration   Anticipated Discharge Needs   Discharge Recommendations Recommend home health for continued speech therapy services   Therapy Recommendations Upon DC Dysphagia Training   Interdisciplinary Plan of Care Collaboration   IDT Collaboration with  Nursing;Physician   Patient Position at End of Therapy In Bed;Bed Alarm On;Call Light within Reach;Tray Table within Reach   Collaboration Comments RN updated     " General

## 2022-11-10 ENCOUNTER — INPATIENT (INPATIENT)
Facility: HOSPITAL | Age: 67
LOS: 10 days | Discharge: HOME | DRG: 885 | End: 2022-11-21
Attending: PSYCHIATRY & NEUROLOGY | Admitting: PSYCHIATRY & NEUROLOGY
Payer: MEDICARE

## 2022-11-10 VITALS
HEART RATE: 104 BPM | TEMPERATURE: 99 F | OXYGEN SATURATION: 98 % | DIASTOLIC BLOOD PRESSURE: 93 MMHG | SYSTOLIC BLOOD PRESSURE: 165 MMHG | RESPIRATION RATE: 18 BRPM | HEIGHT: 67 IN | WEIGHT: 154.98 LBS

## 2022-11-10 DIAGNOSIS — Z98.890 OTHER SPECIFIED POSTPROCEDURAL STATES: Chronic | ICD-10-CM

## 2022-11-10 DIAGNOSIS — F20.9 SCHIZOPHRENIA, UNSPECIFIED: ICD-10-CM

## 2022-11-10 DIAGNOSIS — I71.4 ABDOMINAL AORTIC ANEURYSM, WITHOUT RUPTURE: Chronic | ICD-10-CM

## 2022-11-10 LAB
ALBUMIN SERPL ELPH-MCNC: 3.5 G/DL — SIGNIFICANT CHANGE UP (ref 3.3–5)
ALP SERPL-CCNC: 102 U/L — SIGNIFICANT CHANGE UP (ref 40–120)
ALT FLD-CCNC: 20 U/L — SIGNIFICANT CHANGE UP (ref 12–78)
ANION GAP SERPL CALC-SCNC: 4 MMOL/L — LOW (ref 5–17)
APAP SERPL-MCNC: < 2 UG/ML (ref 10–30)
APPEARANCE UR: CLEAR — SIGNIFICANT CHANGE UP
AST SERPL-CCNC: 21 U/L — SIGNIFICANT CHANGE UP (ref 15–37)
BASOPHILS # BLD AUTO: 0.06 K/UL — SIGNIFICANT CHANGE UP (ref 0–0.2)
BASOPHILS NFR BLD AUTO: 0.9 % — SIGNIFICANT CHANGE UP (ref 0–2)
BILIRUB SERPL-MCNC: 0.4 MG/DL — SIGNIFICANT CHANGE UP (ref 0.2–1.2)
BILIRUB UR-MCNC: NEGATIVE — SIGNIFICANT CHANGE UP
BUN SERPL-MCNC: 28 MG/DL — HIGH (ref 7–23)
CALCIUM SERPL-MCNC: 9.2 MG/DL — SIGNIFICANT CHANGE UP (ref 8.5–10.1)
CHLORIDE SERPL-SCNC: 109 MMOL/L — HIGH (ref 96–108)
CO2 SERPL-SCNC: 26 MMOL/L — SIGNIFICANT CHANGE UP (ref 22–31)
COLOR SPEC: YELLOW — SIGNIFICANT CHANGE UP
CREAT SERPL-MCNC: 1.07 MG/DL — SIGNIFICANT CHANGE UP (ref 0.5–1.3)
DIFF PNL FLD: ABNORMAL
EGFR: 77 ML/MIN/1.73M2 — SIGNIFICANT CHANGE UP
EOSINOPHIL # BLD AUTO: 0.18 K/UL — SIGNIFICANT CHANGE UP (ref 0–0.5)
EOSINOPHIL NFR BLD AUTO: 2.7 % — SIGNIFICANT CHANGE UP (ref 0–6)
ETHANOL SERPL-MCNC: <10 MG/DL — SIGNIFICANT CHANGE UP (ref 0–10)
FLUAV AG NPH QL: SIGNIFICANT CHANGE UP
FLUBV AG NPH QL: SIGNIFICANT CHANGE UP
GLUCOSE SERPL-MCNC: 94 MG/DL — SIGNIFICANT CHANGE UP (ref 70–99)
GLUCOSE UR QL: NEGATIVE — SIGNIFICANT CHANGE UP
HCT VFR BLD CALC: 40.3 % — SIGNIFICANT CHANGE UP (ref 39–50)
HGB BLD-MCNC: 12.9 G/DL — LOW (ref 13–17)
IMM GRANULOCYTES NFR BLD AUTO: 0.4 % — SIGNIFICANT CHANGE UP (ref 0–0.9)
KETONES UR-MCNC: NEGATIVE — SIGNIFICANT CHANGE UP
LEUKOCYTE ESTERASE UR-ACNC: NEGATIVE — SIGNIFICANT CHANGE UP
LYMPHOCYTES # BLD AUTO: 1.55 K/UL — SIGNIFICANT CHANGE UP (ref 1–3.3)
LYMPHOCYTES # BLD AUTO: 22.9 % — SIGNIFICANT CHANGE UP (ref 13–44)
MCHC RBC-ENTMCNC: 29.1 PG — SIGNIFICANT CHANGE UP (ref 27–34)
MCHC RBC-ENTMCNC: 32 GM/DL — SIGNIFICANT CHANGE UP (ref 32–36)
MCV RBC AUTO: 90.8 FL — SIGNIFICANT CHANGE UP (ref 80–100)
MONOCYTES # BLD AUTO: 0.93 K/UL — HIGH (ref 0–0.9)
MONOCYTES NFR BLD AUTO: 13.8 % — SIGNIFICANT CHANGE UP (ref 2–14)
NEUTROPHILS # BLD AUTO: 4.01 K/UL — SIGNIFICANT CHANGE UP (ref 1.8–7.4)
NEUTROPHILS NFR BLD AUTO: 59.3 % — SIGNIFICANT CHANGE UP (ref 43–77)
NITRITE UR-MCNC: NEGATIVE — SIGNIFICANT CHANGE UP
PCP SPEC-MCNC: SIGNIFICANT CHANGE UP
PH UR: 6 — SIGNIFICANT CHANGE UP (ref 5–8)
PLATELET # BLD AUTO: 264 K/UL — SIGNIFICANT CHANGE UP (ref 150–400)
POTASSIUM SERPL-MCNC: 3.9 MMOL/L — SIGNIFICANT CHANGE UP (ref 3.5–5.3)
POTASSIUM SERPL-SCNC: 3.9 MMOL/L — SIGNIFICANT CHANGE UP (ref 3.5–5.3)
PROT SERPL-MCNC: 7.7 GM/DL — SIGNIFICANT CHANGE UP (ref 6–8.3)
PROT UR-MCNC: NEGATIVE — SIGNIFICANT CHANGE UP
RBC # BLD: 4.44 M/UL — SIGNIFICANT CHANGE UP (ref 4.2–5.8)
RBC # FLD: 14.2 % — SIGNIFICANT CHANGE UP (ref 10.3–14.5)
RSV RNA NPH QL NAA+NON-PROBE: SIGNIFICANT CHANGE UP
SALICYLATES SERPL-MCNC: <1.7 MG/DL — LOW (ref 2.8–20)
SARS-COV-2 RNA SPEC QL NAA+PROBE: SIGNIFICANT CHANGE UP
SODIUM SERPL-SCNC: 139 MMOL/L — SIGNIFICANT CHANGE UP (ref 135–145)
SP GR SPEC: 1.02 — SIGNIFICANT CHANGE UP (ref 1.01–1.02)
TSH SERPL-MCNC: 2.16 UU/ML — SIGNIFICANT CHANGE UP (ref 0.34–4.82)
UROBILINOGEN FLD QL: NEGATIVE — SIGNIFICANT CHANGE UP
WBC # BLD: 6.76 K/UL — SIGNIFICANT CHANGE UP (ref 3.8–10.5)
WBC # FLD AUTO: 6.76 K/UL — SIGNIFICANT CHANGE UP (ref 3.8–10.5)

## 2022-11-10 PROCEDURE — 85025 COMPLETE CBC W/AUTO DIFF WBC: CPT

## 2022-11-10 PROCEDURE — 80053 COMPREHEN METABOLIC PANEL: CPT

## 2022-11-10 PROCEDURE — 93010 ELECTROCARDIOGRAM REPORT: CPT

## 2022-11-10 PROCEDURE — 84443 ASSAY THYROID STIM HORMONE: CPT

## 2022-11-10 PROCEDURE — 83036 HEMOGLOBIN GLYCOSYLATED A1C: CPT

## 2022-11-10 PROCEDURE — U0003: CPT

## 2022-11-10 PROCEDURE — 80061 LIPID PANEL: CPT

## 2022-11-10 PROCEDURE — 36415 COLL VENOUS BLD VENIPUNCTURE: CPT

## 2022-11-10 PROCEDURE — 99285 EMERGENCY DEPT VISIT HI MDM: CPT

## 2022-11-10 PROCEDURE — 86803 HEPATITIS C AB TEST: CPT

## 2022-11-10 PROCEDURE — U0005: CPT

## 2022-11-10 RX ORDER — RISPERIDONE 4 MG/1
1 TABLET ORAL ONCE
Refills: 0 | Status: COMPLETED | OUTPATIENT
Start: 2022-11-10 | End: 2022-11-11

## 2022-11-10 RX ORDER — DIPHENHYDRAMINE HCL 50 MG
50 CAPSULE ORAL EVERY 6 HOURS
Refills: 0 | Status: DISCONTINUED | OUTPATIENT
Start: 2022-11-10 | End: 2022-11-11

## 2022-11-10 RX ORDER — OLANZAPINE 15 MG/1
5 TABLET, FILM COATED ORAL EVERY 6 HOURS
Refills: 0 | Status: DISCONTINUED | OUTPATIENT
Start: 2022-11-10 | End: 2022-11-11

## 2022-11-10 NOTE — ED ADULT TRIAGE NOTE - CHIEF COMPLAINT QUOTE
patient brought in by [facility] badge #[id number] for psych eval after making suicidal statements at [facility].  patient denies making statements, denies SI/HI at this time.  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  1:1 initiated.

## 2022-11-10 NOTE — ED BEHAVIORAL HEALTH NOTE - BEHAVIORAL HEALTH NOTE
==================  PRE-HOSPITAL COURSE  ===================  SOURCE:  Secondhand ED documentation & ED provider report  DETAILS: Patient was BIB PD (not under arrest) due to pt expressing SI in the context of a verbal altercation with [facility] shelter staff.     =========  ED COURSE  =========  BEHAVIOR: Patient was described to be currently calm and cooperative, presenting with disorganized thought process with flight of ideas and grandiose thinking, states that he "has 40 medical degrees, works in law enforcement, and is a cosmetic dentist". Per attending, patient responded with "Since I am a doctor, I expect you to listen to what I tell you" when feeling challenged by staff. Patient complains of a cold, nasal congestion, and a slight cough. Patient is described to be well-groomed. Patient denies current SI/HI/AVH and remains in good behavioral control.  TREATMENT:  None  VISITORS: None

## 2022-11-10 NOTE — ED BEHAVIORAL HEALTH ASSESSMENT NOTE - NSSUICPROTFACT_PSY_ALL_CORE
What is lung cancer screening? Lung cancer screening is a way in which doctors check the lungs for early signs of cancer in people who have no symptoms of lung cancer. A low-dose CT scan uses much less radiation than a normal CT scan and shows a more detailed image of the lungs than a standard X-ray. The goal of lung cancer screening is to find cancer early, before it has a chance to grow, spread, or cause problems. One large study found that smokers who were screened with low-dose CT scans were less likely to die of lung cancer than those who were screened with standard X-ray. Below is a summary of the things you need to know regarding screening for lung cancer with low-dose computed tomography (LDCT). This is a screening program that involves routine annual screening with LDCT studies of the lung. The LDCTs are done using low-dose radiation that is not thought to increase your cancer risk. If you have other serious medical conditions (other cancers, congestive heart failure) that limit your life expectancy to less than 10 years, you should not undergo lung cancer screening with LDCT. The chance is 20%-60% that the LDCT result will show abnormalities. This would require additional testing which could include repeat imaging or even invasive procedures. Most (about 95%) of \"abnormal\" LDCT results are false in the sense that no lung cancer is ultimately found. Additionally, some (about 10%) of the cancers found would not affect your life expectancy, even if undetected and untreated. If you are still smoking, the single most important thing that you can do to reduce your risk of dying of lung cancer is to quit. For this screening to be covered by Medicare and most other insurers, strict criteria must be met. If you do not meet these criteria, but still wish to undergo LDCT testing, you will be required to sign a waiver indicating your willingness to pay for the scan. Identifies reasons for living

## 2022-11-10 NOTE — ED PROVIDER NOTE - PSYCHIATRIC, MLM
Alert, + delusions alleging that he's a doctor with "40 degrees" as well as a dentist & .  When asked about SI statements made at shelter, pt deflected the Q & changed the conversation topic.  + Flight of ideas.  Poor insight.

## 2022-11-10 NOTE — ED PROVIDER NOTE - NSICDXPASTMEDICALHX_GEN_ALL_CORE_FT
PAST MEDICAL HISTORY:  AAA (abdominal aortic aneurysm) without rupture     Abdominal aortic aneurysm without rupture 12/2015    HTN (hypertension)     Paranoid schizophrenia     Paranoid schizophrenia     Schizophrenia     Schizophrenia

## 2022-11-10 NOTE — ED BEHAVIORAL HEALTH ASSESSMENT NOTE - NSBHATTESTCOMMENTATTENDFT_PSY_A_CORE
Patient seen and examined on assessment this evening. Chart reviewed. Case discussed with EM provider. On exam, patient is disorganized in his thought process (tangential and circumstantial), extremely irritable (yelling at times and interrupting this MD while he asks questions), paranoid ("I don't trust you") and grandiose ("I have 27 degrees. I'm a doctor.") He will required psychiatric hospitalization for safety, stabilization, and appropriate aftercare planning. Agree with assessment and plan as detailed by Dr. Ratliff.

## 2022-11-10 NOTE — ED PROVIDER NOTE - OBJECTIVE STATEMENT
66 yo SARABJIT HWANG from [facility] shelter for Psychiatric eval after reportedly made SI statements to shelter staff.  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  Pt reports + URI symptoms with mild nasal congestion, occ. dry cough, no F/C, SOB, flu/COVID-type symptoms.  Pt poorly cooperative/guarded re: whether suicidal or not. 68 yo AA M BIB [employer] (not under arrest) from [facility] shelter for psychiatric eval after reportedly made SI statements to shelter staff.  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  Pt reports + URI symptoms with mild nasal congestion, occ. dry cough, no F/C, SOB, flu/COVID-type symptoms.  Pt poorly cooperative/guarded re: whether suicidal or not.

## 2022-11-10 NOTE — ED PROVIDER NOTE - NSICDXPASTSURGICALHX_GEN_ALL_CORE_FT
08-Feb-2020 19:20
PAST SURGICAL HISTORY:  AAA (abdominal aortic aneurysm) Endoscopic repair    H/O hernia repair     H/O knee surgery bilateral    H/O left knee surgery 1980    H/O neck surgery 2007    H/O right knee surgery 1990    History of left knee surgery     History of open heart surgery     History of right knee surgery

## 2022-11-10 NOTE — ED ADULT NURSE NOTE - CHIEF COMPLAINT QUOTE
patient brought in by [facility] badge #[id number] for psych eval after making suicidal statements at [facility].  patient denies making statements, denies SI/HI at this time.  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  1:1 initiated.

## 2022-11-10 NOTE — ED BEHAVIORAL HEALTH ASSESSMENT NOTE - RISK ASSESSMENT
risk factors:  protective factors: Moderate Acute Suicide Risk risk factors: acute psychosis, impulsivity, irritability, hx agitation   protective factors: currently in tx

## 2022-11-10 NOTE — ED PROVIDER NOTE - MUSCULOSKELETAL, MLM
Spine appears normal, range of motion is not limited, no muscle or joint tenderness.  SANTIZO x 4, no focal extermity swelling, tender. Spine appears normal, range of motion is not limited, no muscle or joint tenderness.  SANTIZO x 4, no focal extremity swelling, tender.

## 2022-11-10 NOTE — ED PROVIDER NOTE - PROGRESS NOTE DETAILS
Colt Sutton   received call back from tele psych saying pt warrants involuntary admission for schizophrenia will require form 439 to be filled out. Also requesting Risperdal 1mg, PRN benadryl Colt Sutton   received call back from tele psych saying pt warrants involuntary admission for schizophrenia will require form 439 to be filled out. Also requesting PRN Risperdal 1mg, PRN benadryl 50

## 2022-11-10 NOTE — ED PROVIDER NOTE - CLINICAL SUMMARY MEDICAL DECISION MAKING FREE TEXT BOX
68 yo AA M G[employer]NA [employer] from [facility] shelter for Psychiatric eval after reportedly made SI statements to shelter staff.  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  Pt reports + URI symptoms.  P/E: benign except + delusions alleging that he's a doctor with "40 degrees" as well as a dentist & .  When asked about SI statements made at shelter, pt deflected the Q & changed the conversation topic.  + Flight of ideas.  Poor insight.  Plan: 1:1 observation, EKG, Psych labs, Tele-Psych consult.  Observe, reassess. 66 yo AA M GEENA [employer] from [facility] shelter for psychiatric eval after reportedly made SI statements to shelter staff.  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  Pt reports + URI symptoms.  P/E: benign except + delusions alleging that he's a doctor with "40 degrees" as well as a dentist & .  When asked about SI statements made at shelter, pt deflected the Q & changed the conversation topic.  + Flight of ideas.  Poor insight.  Plan: 1:1 observation, EKG, Psych labs, Tele-Psych consult.  Observe, reassess.

## 2022-11-10 NOTE — ED ADULT NURSE REASSESSMENT NOTE - NS ED NURSE REASSESS COMMENT FT1
Pt is aggressive and combative, refusing to take his medication, pt states he will leave the hospital and made verbal threats against the staff, pt is non compliant with requests and is agitated with every response, pt educated about his admission to the psychiatric unit and the type of admission, pt states he is not in agreement is noticeably agitated, Attempts to calm the patient are unsuccessful. Charge nurse Yenifer made aware of the patients level of aggression.

## 2022-11-10 NOTE — ED ADULT NURSE NOTE - HPI (INCLUDE ILLNESS QUALITY, SEVERITY, DURATION, TIMING, CONTEXT, MODIFYING FACTORS, ASSOCIATED SIGNS AND SYMPTOMS)
Pt angry and irritable, pressured speech, continuously stating "I am here for a cough" not answering other questions for psychiatric assessment.

## 2022-11-10 NOTE — ED BEHAVIORAL HEALTH ASSESSMENT NOTE - DESCRIPTION
66 yo SARABJIT HWANG from [facility] shelter for Psychiatric eval after reportedly made SI statements to shelter staff.  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  Pt reports + URI symptoms with mild nasal congestion, occ. dry cough, no F/C, SOB, flu/COVID-type symptoms.  Pt poorly cooperative/guarded re: whether suicidal or not. see hpi

## 2022-11-10 NOTE — ED PROVIDER NOTE - SKIN, MLM
Skin normal color for race, warm, dry and intact. No evidence of rash.  No tactile warmth, no track marks.

## 2022-11-10 NOTE — ED BEHAVIORAL HEALTH ASSESSMENT NOTE - HPI (INCLUDE ILLNESS QUALITY, SEVERITY, DURATION, TIMING, CONTEXT, MODIFYING FACTORS, ASSOCIATED SIGNS AND SYMPTOMS)
67 year-old M, single, on SSI due to disability, lives in a [facility] shelter, with PMH of HTN, infra-renal AAA, PSH of hernia repair, bilateral knee surgery, neck surgery, and open heart surgery, past psychiatric history of schizophrenia (paranoid type; noncompliant on medications), hx of prior psychiatric hospitalizations (most recentlya at [place] for 27d, discharged July 2022), no prior suicide attempts who presents to the ED BIB by police for psych evaluation in the setting of making statements expressing SI at [facility]. Per ED, "patient denies making statements, denies SI/HI at this time.  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  1:1 initiated." Telepsychiatry consulted for mental health evaluation. 67 year-old M, single, on SSI due to disability, lives in a [facility] shelter, with PMH of HTN, infra-renal AAA, PSH of hernia repair, bilateral knee surgery, neck surgery, and open heart surgery, past psychiatric history of schizophrenia (paranoid type; noncompliant on medications), hx of prior psychiatric hospitalizations (most recentlya at [place] for 27d, discharged July 2022), no prior suicide attempts who presents to the ED BIB by police for psych evaluation in the setting of making statements expressing SI at [facility]. Per ED, "patient denies making statements, denies SI/HI at this time.  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  1:1 initiated." Telepsychiatry consulted for mental health evaluation. No psychiatric PRNs or acute behavioral events reported. Per ED staff, patient was refusing to answer interview questions in the ED.     Chart reviewed, patient seen and examined in a private room on 1:1 using a telepsychiatry cart. On approach, patient had arms crossed, eyebrows furrowed, and appeared irritable and argumentative on interview. Patient would intermittently either refused to answer interview questions or give illogical answers to questions. Patient would often punctuate his speech with an angry and exasperated, "OK!?," while quickly nodding his head while he was speaking on interview. Patient states, "I am here for a cough." Patient states that he has had a cough for the past 2-3 weeks," then began stating that his social security check is months overdue. Patient ultimately stated that he has been trying to come to the hospital for the past week, but didn't have money for a bus pas. Patient initially claimed that he brought himself into the ED, but ultimately stated that "the police brought me here" and that they "just sprung on me," but declined to provide additional information. Patient states that he wants an aspirin or a cough drop. Patient states, "I'm an educated man with 27 degrees" and claimed that he has a medical degree, a pharmacy degree, a [employer] degree, and a dental degree. 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 Patient endorsed a prior psychiatric hospitalization, but declined to provide additional details. Patient claimed that he has a large group home from [facility] which is reportedly overdue, and which reportedly includes real estate.     Patient denies suicidal ideation, intent, or plan at time of interview. No HI. Denies any prior NSSIB. Patient denies acute symptoms of brent, PTSD, or psychosis at this time.     Substances  - Patient denies recent use of alcohol, nicotine, marijuana, or any illicit substances.    Psychiatric history:   - prior psychiatric Dx: denies; schizophrenia per [facility]  - psychiatrist: denies   - therapist: denies   - prior psychiatric medications: initially denied, but then stated he was on haldol and cogentin before    Family psychiatric history: denies; appeared upset about being asked    Home medications: denies    Allergies (+rxn): denies     Social history:   - born in:   - highest education level:   - school/employment:     Legal history: claims "I've been incarcerated half my life"  but declined to explain why     Collateral (relation: , #): 67 year-old M, single, on SSI due to disability, lives in a [facility] shelter, with PMH of HTN, infra-renal AAA, PSH of hernia repair, bilateral knee surgery, neck surgery, and open heart surgery, past psychiatric history of schizophrenia (paranoid type; noncompliant on medications), hx of prior psychiatric hospitalizations (most recentlya at [place] for 27d, discharged July 2022), no prior suicide attempts who presents to the ED BIB by police for psych evaluation in the setting of making statements expressing SI at [facility]. Per ED, "patient denies making statements, denies SI/HI at this time.  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  1:1 initiated." Telepsychiatry consulted for mental health evaluation. No psychiatric PRNs or acute behavioral events reported. Per ED staff, patient was refusing to answer interview questions in the ED.     Chart reviewed, patient seen and examined in a private room on 1:1 using a telepsychiatry cart. On approach, patient had arms crossed, eyebrows furrowed, and appeared irritable and argumentative with a high volume on interview. Patient would intermittently either refused to answer interview questions or give illogical answers to questions. Patient would often punctuate his speech with an angry and exasperated, "OK!?," while quickly nodding his head while he was speaking on interview. Patient states, "I am here for a cough." Patient states that he has had a cough for the past 2-3 weeks," then began stating that his social security check is months overdue. Patient ultimately stated that he has been trying to come to the hospital for the past week, but didn't have money for a bus pas. Patient initially claimed that he brought himself into the ED, but ultimately stated that "the police brought me here" and that they "just sprung on me," but declined to provide additional information. Patient states that he wants an aspirin or a cough drop. Patient states, "I'm an educated man with 27 degrees" and claimed that he has a medical degree, a pharmacy degree, a [employer] degree, and a dental degree. 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 Patient endorsed a prior psychiatric hospitalization, but declined to provide additional details. Patient claimed that he has a large prison from [employer] which is reportedly overdue, and which reportedly includes real estate.     Patient denies suicidal ideation, intent, or plan at time of interview. Denies prior SI or SA. Denies HI. Denies any prior NSSIB.  Denied acute symptoms of depression, but complained of poor quality sleep "all my life" and decreased appetite chronically. Patient when asked about decreased sleep need in the context of brent, patient claimed that he has a lot of energy when he eats food, but states that he has not had money to eat recently. Declined to answer additional questions regarding brent symptoms. Denied acute symptoms of PTSD. Declined AH/VH. When asked about paranoid ideation, states that sometimes he thinks people are out to kill him (declined to explain who) but stated that he is not going anywhere.      Substances  - Patient denies recent use of alcohol, nicotine, marijuana, or any illicit substances.    Psychiatric history:   - prior psychiatric Dx: denies; schizophrenia per [facility]  - psychiatrist/therapist: denies   - prior psychiatric medications: initially denied, but then stated he was on haldol and cogentin before    Family psychiatric history: denies; appeared upset about being asked    Home medications: denies    Allergies (+rxn):   - endorses multiple food allergies - herrera, radishes, beets, coffee, cabbage, watermelon, cantaloupe, oatmeal   - endorses reported allergy to haldol and cogentin    Social history:   - states that he has been living in a shelter at [facility] for many years   - states that he is single, unmarried, but has "many children and grandchildren"     Legal history: claims "I've been incarcerated half my life" but declined to elaborate why (became irritable when asked)     Collateral: attempted to reach [facility] in [facility] at [phone], but not available due to after hours. 67 year-old M, single, on SSI due to disability, lives in a [facility] shelter, with PMH of HTN, infra-renal AAA, PSH of hernia repair, bilateral knee surgery, neck surgery, and open heart surgery, past psychiatric history of schizophrenia (paranoid type; noncompliant on medications), hx of prior psychiatric hospitalizations (most recentlya at [place] for 27d, discharged July 2022), no prior suicide attempts who presents to the ED BIB by police for psych evaluation in the setting of making statements expressing SI at [facility]. Per ED, "patient denies making statements, denies SI/HI at this time.  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  1:1 initiated." Telepsychiatry consulted for mental health evaluation. No psychiatric PRNs or acute behavioral events reported. Per ED staff, patient was refusing to answer interview questions in the ED.     Chart reviewed, patient seen and examined in a private room on 1:1 using a telepsychiatry cart. On approach, patient had arms crossed, eyebrows furrowed, and appeared irritable and argumentative with a high volume on interview. Patient would intermittently either refused to answer interview questions or give illogical answers to questions. Patient would often punctuate his speech with an angry and exasperated, "OK!?," while quickly nodding his head while he was speaking on interview. Patient states, "I am here for a cough." Patient states that he has had a cough for the past 2-3 weeks," then began stating that his social security check is months overdue. Patient ultimately stated that he has been trying to come to the hospital for the past week, but didn't have money for a bus pas. Patient initially claimed that he brought himself into the ED, but ultimately stated that "the police brought me here" and that they "just sprung on me," but declined to provide additional information. Patient states that he wants an aspirin or a cough drop. Patient states, "I'm an educated man with 27 degrees" and claimed that he has a medical degree, a pharmacy degree, a [employer] degree, and a dental degree. 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 Patient endorsed a prior psychiatric hospitalization, but declined to provide additional details. Patient claimed that he has a large snf from [employer] which is reportedly overdue, and which reportedly includes real estate.     Patient denies suicidal ideation, intent, or plan at time of interview. Denies prior SI or SA. Denies HI. Denies any prior NSSIB.  Denied acute symptoms of depression, but complained of poor quality sleep "all my life" and decreased appetite chronically. Patient when asked about decreased sleep need in the context of brent, patient claimed that he has a lot of energy when he eats food, but states that he has not had money to eat recently. Declined to answer additional questions regarding brent symptoms. Denied acute symptoms of PTSD. Declined AH/VH. When asked about paranoid ideation, states that sometimes he thinks people are out to kill him (declined to explain who) but stated that he is not going anywhere. At some point during interview, patient made comment about not liking talking to "you people" - when asked what he meant, patient stated that he was attracted to women and complained of living in a homeless "shelter with men."     Substances  - Patient denies recent use of alcohol, nicotine, marijuana, or any illicit substances.    Psychiatric history:   - prior psychiatric Dx: denies; schizophrenia per [facility]  - psychiatrist/therapist: denies   - prior psychiatric medications: initially denied, but then stated he was on haldol and cogentin before    Family psychiatric history: denies; appeared upset about being asked    Home medications: denies    Allergies (+rxn):   - endorses multiple food allergies - herrera, radishes, beets, coffee, cabbage, watermelon, cantaloupe, oatmeal   - endorses reported allergy to haldol and cogentin    Social history:   - states that he has been living in a shelter at [facility] for many years   - states that he is single, unmarried, but has "many children and grandchildren"     Legal history: claims "I've been incarcerated half my life" but declined to elaborate why (became irritable when asked)     Collateral: attempted to reach [facility] in [facility] at [phone], but not available due to after hours. 67 year-old M, single, on SSI due to disability, lives in a [facility] shelter, with PMH of HTN, infra-renal AAA, PSH of hernia repair, bilateral knee surgery, neck surgery, and open heart surgery, past psychiatric history of schizophrenia (paranoid type; noncompliant on medications), hx of prior psychiatric hospitalizations (most recentlya at [place] for 27d, discharged July 2022), no prior suicide attempts who presents to the ED BIB by police for psych evaluation in the setting of making statements expressing SI at [facility]. Per ED, "patient denies making statements, denies SI/HI at this time.  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  1:1 initiated." Telepsychiatry consulted for mental health evaluation. No psychiatric PRNs or acute behavioral events reported. Per ED staff, patient was refusing to answer interview questions in the ED.     Chart reviewed, patient seen and examined in a private room on 1:1 using a telepsychiatry cart. On approach, patient had arms crossed, eyebrows furrowed, and appeared irritable and argumentative with a high volume on interview. Patient would intermittently either refused to answer interview questions or give illogical answers to questions. Patient would often punctuate his speech with an angry and exasperated, "OK!?," while quickly nodding his head while he was speaking on interview. Patient states, "I am here for a cough." Patient states that he has had a cough for the past 2-3 weeks," then began stating that his social security check is months overdue. Patient ultimately stated that he has been trying to come to the hospital for the past week, but didn't have money for a bus pas. Patient initially claimed that he brought himself into the ED, but ultimately stated that "the police brought me here" and that they "just sprung on me," but declined to provide additional information. Patient states that he wants an aspirin or a cough drop. Patient states, "I'm an educated man with 27 degrees" and claimed that he has a medical degree, a pharmacy degree, a [employer] degree, and a dental degree. 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 Patient endorsed a prior psychiatric hospitalization, but declined to provide additional details. Patient claimed that he has a large longterm from [employer] which is reportedly overdue, and which reportedly includes real estate.     Patient denies suicidal ideation, intent, or plan at time of interview. Denies prior SI or SA. Denies HI. Denies any prior NSSIB.  Denied acute symptoms of depression, but complained of poor quality sleep "all my life" and decreased appetite chronically. Patient when asked about decreased sleep need in the context of brent, patient claimed that he has a lot of energy when he eats food, but states that he has not had money to eat recently. Declined to answer additional questions regarding brent symptoms. Denied acute symptoms of PTSD. Declined AH/VH. When asked about paranoid ideation, states that sometimes he thinks people are out to kill him (declined to explain who) but stated that he is not going anywhere. At some point during interview, patient made comment about not liking talking to "you people" - when asked what he meant, patient stated that he was attracted to women and complained of living in a homeless "shelter with men."     Substances  - Patient denies recent use of alcohol, nicotine, marijuana, or any illicit substances.    Psychiatric history:   - prior psychiatric Dx: denies; schizophrenia per [facility]  - psychiatrist/therapist: denies   - prior psychiatric medications: initially denied, but then stated he was on haldol and cogentin before    Family psychiatric history: denies; appeared upset about being asked    Home medications: denies    Allergies (+rxn):   - endorses multiple food allergies - herrera, radishes, beets, coffee, cabbage, watermelon, cantaloupe, oatmeal   - endorses reported allergy to haldol and cogentin    Social history:   - states that he has been living in a shelter at [facility] for many years   - states that he is single, unmarried, but has "many children and grandchildren"     Legal history: claims "I've been incarcerated half my life" but declined to elaborate why (became irritable when asked)     Collateral: attempted to reach [facility] in [facility] at [phone], but not available due to after hours.

## 2022-11-11 PROBLEM — F20.9 SCHIZOPHRENIA, UNSPECIFIED: Chronic | Status: ACTIVE | Noted: 2018-04-11

## 2022-11-11 PROBLEM — I10 ESSENTIAL (PRIMARY) HYPERTENSION: Chronic | Status: ACTIVE | Noted: 2018-03-20

## 2022-11-11 LAB
A1C WITH ESTIMATED AVERAGE GLUCOSE RESULT: 5.6 % — SIGNIFICANT CHANGE UP (ref 4–5.6)
ALBUMIN SERPL ELPH-MCNC: 3.2 G/DL — LOW (ref 3.3–5)
ALP SERPL-CCNC: 91 U/L — SIGNIFICANT CHANGE UP (ref 40–120)
ALT FLD-CCNC: 19 U/L — SIGNIFICANT CHANGE UP (ref 12–78)
ANION GAP SERPL CALC-SCNC: 4 MMOL/L — LOW (ref 5–17)
AST SERPL-CCNC: 19 U/L — SIGNIFICANT CHANGE UP (ref 15–37)
BASOPHILS # BLD AUTO: 0.05 K/UL — SIGNIFICANT CHANGE UP (ref 0–0.2)
BASOPHILS NFR BLD AUTO: 1.1 % — SIGNIFICANT CHANGE UP (ref 0–2)
BILIRUB SERPL-MCNC: 0.7 MG/DL — SIGNIFICANT CHANGE UP (ref 0.2–1.2)
BUN SERPL-MCNC: 18 MG/DL — SIGNIFICANT CHANGE UP (ref 7–23)
CALCIUM SERPL-MCNC: 9.2 MG/DL — SIGNIFICANT CHANGE UP (ref 8.5–10.1)
CHLORIDE SERPL-SCNC: 109 MMOL/L — HIGH (ref 96–108)
CO2 SERPL-SCNC: 27 MMOL/L — SIGNIFICANT CHANGE UP (ref 22–31)
CREAT SERPL-MCNC: 1 MG/DL — SIGNIFICANT CHANGE UP (ref 0.5–1.3)
EGFR: 83 ML/MIN/1.73M2 — SIGNIFICANT CHANGE UP
EOSINOPHIL # BLD AUTO: 0.2 K/UL — SIGNIFICANT CHANGE UP (ref 0–0.5)
EOSINOPHIL NFR BLD AUTO: 4.4 % — SIGNIFICANT CHANGE UP (ref 0–6)
ESTIMATED AVERAGE GLUCOSE: 114 MG/DL — SIGNIFICANT CHANGE UP (ref 68–114)
GLUCOSE SERPL-MCNC: 94 MG/DL — SIGNIFICANT CHANGE UP (ref 70–99)
HCT VFR BLD CALC: 40.9 % — SIGNIFICANT CHANGE UP (ref 39–50)
HGB BLD-MCNC: 13.1 G/DL — SIGNIFICANT CHANGE UP (ref 13–17)
IMM GRANULOCYTES NFR BLD AUTO: 0.4 % — SIGNIFICANT CHANGE UP (ref 0–0.9)
LYMPHOCYTES # BLD AUTO: 0.99 K/UL — LOW (ref 1–3.3)
LYMPHOCYTES # BLD AUTO: 21.7 % — SIGNIFICANT CHANGE UP (ref 13–44)
MCHC RBC-ENTMCNC: 29.4 PG — SIGNIFICANT CHANGE UP (ref 27–34)
MCHC RBC-ENTMCNC: 32 GM/DL — SIGNIFICANT CHANGE UP (ref 32–36)
MCV RBC AUTO: 91.7 FL — SIGNIFICANT CHANGE UP (ref 80–100)
MONOCYTES # BLD AUTO: 0.64 K/UL — SIGNIFICANT CHANGE UP (ref 0–0.9)
MONOCYTES NFR BLD AUTO: 14 % — SIGNIFICANT CHANGE UP (ref 2–14)
NEUTROPHILS # BLD AUTO: 2.67 K/UL — SIGNIFICANT CHANGE UP (ref 1.8–7.4)
NEUTROPHILS NFR BLD AUTO: 58.4 % — SIGNIFICANT CHANGE UP (ref 43–77)
PLATELET # BLD AUTO: 234 K/UL — SIGNIFICANT CHANGE UP (ref 150–400)
POTASSIUM SERPL-MCNC: 3.5 MMOL/L — SIGNIFICANT CHANGE UP (ref 3.5–5.3)
POTASSIUM SERPL-SCNC: 3.5 MMOL/L — SIGNIFICANT CHANGE UP (ref 3.5–5.3)
PROT SERPL-MCNC: 7.2 GM/DL — SIGNIFICANT CHANGE UP (ref 6–8.3)
RBC # BLD: 4.46 M/UL — SIGNIFICANT CHANGE UP (ref 4.2–5.8)
RBC # FLD: 14.3 % — SIGNIFICANT CHANGE UP (ref 10.3–14.5)
SODIUM SERPL-SCNC: 140 MMOL/L — SIGNIFICANT CHANGE UP (ref 135–145)
WBC # BLD: 4.57 K/UL — SIGNIFICANT CHANGE UP (ref 3.8–10.5)
WBC # FLD AUTO: 4.57 K/UL — SIGNIFICANT CHANGE UP (ref 3.8–10.5)

## 2022-11-11 PROCEDURE — 99232 SBSQ HOSP IP/OBS MODERATE 35: CPT

## 2022-11-11 RX ORDER — ATORVASTATIN CALCIUM 80 MG/1
20 TABLET, FILM COATED ORAL AT BEDTIME
Refills: 0 | Status: DISCONTINUED | OUTPATIENT
Start: 2022-11-11 | End: 2022-11-21

## 2022-11-11 RX ORDER — ASPIRIN/CALCIUM CARB/MAGNESIUM 324 MG
81 TABLET ORAL DAILY
Refills: 0 | Status: DISCONTINUED | OUTPATIENT
Start: 2022-11-11 | End: 2022-11-21

## 2022-11-11 RX ORDER — METOPROLOL TARTRATE 50 MG
12.5 TABLET ORAL
Refills: 0 | Status: DISCONTINUED | OUTPATIENT
Start: 2022-11-11 | End: 2022-11-21

## 2022-11-11 RX ORDER — DIPHENHYDRAMINE HCL 50 MG
50 CAPSULE ORAL EVERY 6 HOURS
Refills: 0 | Status: DISCONTINUED | OUTPATIENT
Start: 2022-11-11 | End: 2022-11-18

## 2022-11-11 RX ORDER — OLANZAPINE 15 MG/1
5 TABLET, FILM COATED ORAL EVERY 6 HOURS
Refills: 0 | Status: DISCONTINUED | OUTPATIENT
Start: 2022-11-11 | End: 2022-11-14

## 2022-11-11 RX ORDER — NICOTINE POLACRILEX 2 MG
14 GUM BUCCAL DAILY
Refills: 0 | Status: DISCONTINUED | OUTPATIENT
Start: 2022-11-11 | End: 2022-11-21

## 2022-11-11 RX ADMIN — Medication 50 MILLIGRAM(S): at 00:21

## 2022-11-11 RX ADMIN — OLANZAPINE 5 MILLIGRAM(S): 15 TABLET, FILM COATED ORAL at 00:20

## 2022-11-11 NOTE — BH SAFETY PLAN - INTERNAL COPING STRATEGY 1
On unit: Movies, music, crafts, healing room, exercise, video games. aroma therapy, talk to a staff member.

## 2022-11-11 NOTE — ED ADULT NURSE REASSESSMENT NOTE - NS ED NURSE REASSESS COMMENT FT1
Pt is being admitted to Psychiatric unit, report given to JUVENAL Quiñones, pt hx, reason for admission and brief overview of symptoms and current medication, pt refused to receive the benadryl IM, pt is agitated leaving the unit and is not compliant with medication administration, pt received Zyprexa IM, pt not in any pain or distress at this time. VS recorded prior to transfer. Transport arrived to transport patient to the floor. Pt wanded and transported with security, 1:1 and transport.

## 2022-11-11 NOTE — PATIENT PROFILE ADULT - VISION (WITH CORRECTIVE LENSES IF THE PATIENT USUALLY WEARS THEM):
Verbal order and read back per Ward Hamlin NP  The INR is below the therapeutic range. Please make the following adjustments to Coumadin dosing: Increase Coumadin to 5 mg daily except 2.5 mg on Mon, Wed, and Fri  Repeat the INR in 2 weeks.   Patient informed of instructions, read back and verbalized understanding Isabela Zurita LPN Normal vision: sees adequately in most situations; can see medication labels, newsprint

## 2022-11-11 NOTE — BH INPATIENT PSYCHIATRY ASSESSMENT NOTE - HPI (INCLUDE ILLNESS QUALITY, SEVERITY, DURATION, TIMING, CONTEXT, MODIFYING FACTORS, ASSOCIATED SIGNS AND SYMPTOMS)
67 year-old M, single, on SSI due to disability, lives in a [facility] shelter, with PMH of HTN, infra-renal AAA, PSH of hernia repair, bilateral knee surgery, neck surgery, and open heart surgery, past psychiatric history of schizophrenia (paranoid type; noncompliant on medications), hx of prior psychiatric hospitalizations (most recentlya at [facility] for 27d, discharged July 2022), no prior suicide attempts who presents to the ED BIB by police for psych evaluation in the setting of making statements expressing SI at [facility]. Per ED, "patient denies making statements, denies SI/HI at this time.  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  1:1 initiated." Telepsychiatry consulted for mental health evaluation. No psychiatric PRNs or acute behavioral events reported. Per ED staff, patient was refusing to answer interview questions in the ED.     Chart reviewed, patient seen and examined in a private room on 1:1 using a telepsychiatry cart. On approach, patient had arms crossed, eyebrows furrowed, and appeared irritable and argumentative with a high volume on interview. Patient would intermittently either refused to answer interview questions or give illogical answers to questions. Patient would often punctuate his speech with an angry and exasperated, "OK!?," while quickly nodding his head while he was speaking on interview. Patient states, "I am here for a cough." Patient states that he has had a cough for the past 2-3 weeks," then began stating that his social security check is months overdue. Patient ultimately stated that he has been trying to come to the hospital for the past week, but didn't have money for a bus pas. Patient initially claimed that he brought himself into the ED, but ultimately stated that "the police brought me here" and that they "just sprung on me," but declined to provide additional information. Patient states that he wants an aspirin or a cough drop. Patient states, "I'm an educated man with 27 degrees" and claimed that he has a medical degree, a pharmacy degree, a [employer] degree, and a dental degree. 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 Patient endorsed a prior psychiatric hospitalization, but declined to provide additional details. Patient claimed that he has a large long term from [employer] which is reportedly overdue, and which reportedly includes real estate.     Patient denies suicidal ideation, intent, or plan at time of interview. Denies prior SI or SA. Denies HI. Denies any prior NSSIB.  Denied acute symptoms of depression, but complained of poor quality sleep "all my life" and decreased appetite chronically. Patient when asked about decreased sleep need in the context of brent, patient claimed that he has a lot of energy when he eats food, but states that he has not had money to eat recently. Declined to answer additional questions regarding brent symptoms. Denied acute symptoms of PTSD. Declined AH/VH. When asked about paranoid ideation, states that sometimes he thinks people are out to kill him (declined to explain who) but stated that he is not going anywhere. At some point during interview, patient made comment about not liking talking to "you people" - when asked what he meant, patient stated that he was attracted to women and complained of living in a homeless "shelter with men."     Substances  - Patient denies recent use of alcohol, nicotine, marijuana, or any illicit substances.    Psychiatric history:   - prior psychiatric Dx: denies; schizophrenia per [facility]  - psychiatrist/therapist: denies   - prior psychiatric medications: initially denied, but then stated he was on haldol and cogentin before    Family psychiatric history: denies; appeared upset about being asked    Home medications: denies    Allergies (+rxn):   - endorses multiple food allergies - herrera, radishes, beets, coffee, cabbage, watermelon, cantaloupe, oatmeal   - endorses reported allergy to haldol and cogentin    Social history:   - states that he has been living in a shelter at [facility] for many years   - states that he is single, unmarried, but has "many children and grandchildren"     Legal history: claims "I've been incarcerated half my life" but declined to elaborate why (became irritable when asked)     Collateral: attempted to reach [facility] in [facility] at [phone], but not available due to after hours.

## 2022-11-11 NOTE — H&P ADULT - ASSESSMENT
#Schizophrenia  #suicidal ideation previously expressed  #Opiate use  1. mgmt as per psych      #Tobacco use  1. nicotine patch      #Hx AAA and other aneurysm repairs 2018  diwscussed the importance of taking the medication to decrease rate of aging on these structures; pt agrees to restart  1. asa 81 mg  2. atorvastatin 20 mg  3. metoprolol 12.5 mg BID w parameters      #Tender foot callouses  1. podiatry consult      will follow to make sure medications are tolerated       #Schizophrenia  #suicidal ideation previously expressed  #Opiate use  1. mgmt as per psych      #Tobacco use  1. nicotine patch      #Hx AAA and other aneurysm repairs 2018  discussed the importance of taking the medication to decrease rate of aging on these structures; pt agrees to restart  1. asa 81 mg  2. atorvastatin 20 mg  3. metoprolol 12.5 mg BID w parameters      #Tender foot callouses  1. podiatry consult      will follow to make sure medications are tolerated

## 2022-11-11 NOTE — H&P ADULT - CARDIOVASCULAR
Stable on duloxetine. Seeing psychology. Ok to see psychiatry.   regular rate and rhythm/S1 S2 present/no murmur/no JVD/no pedal edema details…

## 2022-11-11 NOTE — BH INPATIENT PSYCHIATRY ASSESSMENT NOTE - NSBHMETABOLIC_PSY_ALL_CORE_FT
Treatment Type (Optional): Anti Aging Facial Comments (Sticky): *Brightening/jojoba beads cleanser, warm towel off, papaya/lightening, extractions, alcohol swab, 20+ peel, equalizing toner, stem cell mask, cold towel off , soothing rinse, Phloretin, mandellic, metacell, intellishade, ormedic  lip.\\n\\n*Using Abhilash for 2ret peels, 2 Microneedling, 1 dermaplaning Detail Level: Zone Mask Type (Optional): cream based BMI: BMI (kg/m2): 24.1 (11-11-22 @ 00:52)  HbA1c: A1C with Estimated Average Glucose Result: 5.6 % (11-11-22 @ 09:00)    Glucose:   BP: --  Lipid Panel: Date/Time: 11-12-22 @ 08:36  Cholesterol, Serum: 234  Direct LDL: --  HDL Cholesterol, Serum: 51  Total Cholesterol/HDL Ration Measurement: --  Triglycerides, Serum: 76

## 2022-11-11 NOTE — H&P ADULT - HISTORY OF PRESENT ILLNESS
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,      followed by endovascular aortic aneurysm repair with GORE Excluder device  Open heart surgery  Neck surgery  Knee surgery Bilateral   Hernia repair 67 jimmy old male w hx AAA s/p repair, HTN, smoker, schizophrenia who presented to ED w [facility] due to suicidal statements at [facility]  Now admitted to psych      PAST MEDICAL HX  AAA intra-renal abdominal aortic aneurysm  HLD hyperlipidemia  HTN hypertension  Smoker      PAST SURGICAL HX  AAA endovascular repair   LIJ     coil embolization of right internal iliac artery,      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

## 2022-11-11 NOTE — BH INPATIENT PSYCHIATRY ASSESSMENT NOTE - CURRENT MEDICATION
MEDICATIONS  (STANDING):  aspirin  chewable 81 milliGRAM(s) Oral daily  atorvastatin 20 milliGRAM(s) Oral at bedtime  metoprolol tartrate 12.5 milliGRAM(s) Oral two times a day  nicotine -  14 mG/24Hr(s) Patch 14 Patch Transdermal daily  risperiDONE   Tablet 2 milliGRAM(s) Oral two times a day    MEDICATIONS  (PRN):  benzocaine 15 mG/menthol 3.6 mG Lozenge 1 Lozenge Oral every 6 hours PRN Sore Throat  diphenhydrAMINE Injectable 50 milliGRAM(s) IntraMuscular every 6 hours PRN Menacing behavior  OLANZapine Injectable 5 milliGRAM(s) IntraMuscular every 6 hours PRN agitation

## 2022-11-11 NOTE — BH INPATIENT PSYCHIATRY ASSESSMENT NOTE - NSBHCHARTREVIEWVS_PSY_A_CORE FT
Vital Signs Last 24 Hrs  T(C): 36.6 (11-14-22 @ 07:32), Max: 36.6 (11-14-22 @ 07:32)  T(F): 97.8 (11-14-22 @ 07:32), Max: 97.8 (11-14-22 @ 07:32)  HR: --  BP: --  BP(mean): --  RR: 18 (11-14-22 @ 07:32) (18 - 18)  SpO2: 100% (11-14-22 @ 07:32) (100% - 100%)    Orthostatic VS  11-14-22 @ 07:32  Lying BP: --/-- HR: --  Sitting BP: 143/84 HR: 80  Standing BP: 133/81 HR: 80  Site: upper right arm  Mode: electronic  Orthostatic VS  11-13-22 @ 08:07  Lying BP: --/-- HR: --  Sitting BP: 135/73 HR: 85  Standing BP: 140/81 HR: 83  Site: upper right arm  Mode: electronic

## 2022-11-11 NOTE — BH INPATIENT PSYCHIATRY ASSESSMENT NOTE - NSTXDCOTHRGOAL_PSY_ALL_CORE
Patient will be referred to the appropriate level of outpatient treatment and have appointments within 3-5 days of discharge.   will explore need for duel diagnosis tx with appointments if needed.  Benefits in place

## 2022-11-11 NOTE — BH INPATIENT PSYCHIATRY ASSESSMENT NOTE - RISK ASSESSMENT
risk factors: acute psychosis, impulsivity, irritability, hx agitation   protective factors: currently in tx

## 2022-11-12 LAB
CHOLEST SERPL-MCNC: 234 MG/DL — HIGH
HCV AB S/CO SERPL IA: 0.12 S/CO — SIGNIFICANT CHANGE UP (ref 0–0.99)
HCV AB SERPL-IMP: SIGNIFICANT CHANGE UP
HDLC SERPL-MCNC: 51 MG/DL — SIGNIFICANT CHANGE UP
LIPID PNL WITH DIRECT LDL SERPL: 168 MG/DL — HIGH
NON HDL CHOLESTEROL: 183 MG/DL — HIGH
TRIGL SERPL-MCNC: 76 MG/DL — SIGNIFICANT CHANGE UP
TSH SERPL-MCNC: 3.06 UU/ML — SIGNIFICANT CHANGE UP (ref 0.34–4.82)

## 2022-11-12 RX ADMIN — ATORVASTATIN CALCIUM 20 MILLIGRAM(S): 80 TABLET, FILM COATED ORAL at 20:41

## 2022-11-12 RX ADMIN — Medication 81 MILLIGRAM(S): at 10:01

## 2022-11-12 RX ADMIN — Medication 12.5 MILLIGRAM(S): at 10:01

## 2022-11-12 RX ADMIN — Medication 12.5 MILLIGRAM(S): at 20:41

## 2022-11-13 LAB — SARS-COV-2 RNA SPEC QL NAA+PROBE: SIGNIFICANT CHANGE UP

## 2022-11-13 RX ORDER — BENZOCAINE AND MENTHOL 5; 1 G/100ML; G/100ML
1 LIQUID ORAL EVERY 6 HOURS
Refills: 0 | Status: DISCONTINUED | OUTPATIENT
Start: 2022-11-13 | End: 2022-11-21

## 2022-11-13 RX ADMIN — Medication 12.5 MILLIGRAM(S): at 09:30

## 2022-11-13 RX ADMIN — ATORVASTATIN CALCIUM 20 MILLIGRAM(S): 80 TABLET, FILM COATED ORAL at 20:50

## 2022-11-13 RX ADMIN — Medication 81 MILLIGRAM(S): at 09:30

## 2022-11-13 RX ADMIN — BENZOCAINE AND MENTHOL 1 LOZENGE: 5; 1 LIQUID ORAL at 14:25

## 2022-11-13 RX ADMIN — Medication 12.5 MILLIGRAM(S): at 20:51

## 2022-11-14 DIAGNOSIS — F20.0 PARANOID SCHIZOPHRENIA: ICD-10-CM

## 2022-11-14 PROCEDURE — 99232 SBSQ HOSP IP/OBS MODERATE 35: CPT

## 2022-11-14 RX ORDER — RISPERIDONE 4 MG/1
2 TABLET ORAL
Refills: 0 | Status: DISCONTINUED | OUTPATIENT
Start: 2022-11-14 | End: 2022-11-14

## 2022-11-14 RX ORDER — RISPERIDONE 4 MG/1
2 TABLET ORAL
Refills: 0 | Status: DISCONTINUED | OUTPATIENT
Start: 2022-11-14 | End: 2022-11-21

## 2022-11-14 RX ORDER — TRAZODONE HCL 50 MG
50 TABLET ORAL AT BEDTIME
Refills: 0 | Status: DISCONTINUED | OUTPATIENT
Start: 2022-11-14 | End: 2022-11-21

## 2022-11-14 RX ORDER — CHLORPROMAZINE HCL 10 MG
50 TABLET ORAL ONCE
Refills: 0 | Status: DISCONTINUED | OUTPATIENT
Start: 2022-11-14 | End: 2022-11-18

## 2022-11-14 RX ADMIN — BENZOCAINE AND MENTHOL 1 LOZENGE: 5; 1 LIQUID ORAL at 16:46

## 2022-11-14 RX ADMIN — Medication 12.5 MILLIGRAM(S): at 20:43

## 2022-11-14 RX ADMIN — Medication 50 MILLIGRAM(S): at 20:43

## 2022-11-14 RX ADMIN — ATORVASTATIN CALCIUM 20 MILLIGRAM(S): 80 TABLET, FILM COATED ORAL at 20:44

## 2022-11-14 RX ADMIN — RISPERIDONE 2 MILLIGRAM(S): 4 TABLET ORAL at 20:43

## 2022-11-14 RX ADMIN — Medication 81 MILLIGRAM(S): at 09:29

## 2022-11-14 RX ADMIN — BENZOCAINE AND MENTHOL 1 LOZENGE: 5; 1 LIQUID ORAL at 09:41

## 2022-11-14 RX ADMIN — Medication 12.5 MILLIGRAM(S): at 09:29

## 2022-11-14 NOTE — BH SOCIAL WORK INITIAL PSYCHOSOCIAL EVALUATION - OTHER PAST PSYCHIATRIC HISTORY (INCLUDE DETAILS REGARDING ONSET, COURSE OF ILLNESS, INPATIENT/OUTPATIENT TREATMENT)
Patient was admitted to  via the ED where he was bib scpd from the [facility] shelter, pt reports due to a cough. Patient presents as uncooperative, disorganized with grandiose delusional thinking that he is a doctor and has 23 degrees. Speech is loud at times, difficult to understand with psychotic content. He does report prior psychiatric admissions, the last at [facility] in July 2022, but states that he is not currently in psychiatric outpatient treatment.

## 2022-11-14 NOTE — BH SOCIAL WORK INITIAL PSYCHOSOCIAL EVALUATION - NSBHBARRIERS_PSY_ALL_CORE
Financial difficulties/Lack of support/Lack of insight/Low motivation/Medical co-morbidities/Non-compliant with treatment/Poor judgement

## 2022-11-15 PROCEDURE — 99232 SBSQ HOSP IP/OBS MODERATE 35: CPT

## 2022-11-15 RX ADMIN — Medication 12.5 MILLIGRAM(S): at 09:19

## 2022-11-15 RX ADMIN — Medication 81 MILLIGRAM(S): at 09:19

## 2022-11-15 RX ADMIN — RISPERIDONE 2 MILLIGRAM(S): 4 TABLET ORAL at 09:19

## 2022-11-15 RX ADMIN — RISPERIDONE 2 MILLIGRAM(S): 4 TABLET ORAL at 20:32

## 2022-11-15 RX ADMIN — ATORVASTATIN CALCIUM 20 MILLIGRAM(S): 80 TABLET, FILM COATED ORAL at 20:32

## 2022-11-15 RX ADMIN — Medication 12.5 MILLIGRAM(S): at 20:32

## 2022-11-15 RX ADMIN — Medication 50 MILLIGRAM(S): at 20:31

## 2022-11-15 NOTE — BH INPATIENT PSYCHIATRY PROGRESS NOTE - NSBHCHARTREVIEWVS_PSY_A_CORE FT
Vital Signs Last 24 Hrs  T(C): 36.4 (11-15-22 @ 07:40), Max: 36.4 (11-15-22 @ 07:40)  T(F): 97.6 (11-15-22 @ 07:40), Max: 97.6 (11-15-22 @ 07:40)  HR: --  BP: --  BP(mean): --  RR: 16 (11-15-22 @ 07:40) (16 - 16)  SpO2: 100% (11-15-22 @ 07:40) (100% - 100%)    Orthostatic VS  11-15-22 @ 07:40  Lying BP: --/-- HR: --  Sitting BP: 117/61 HR: 58  Standing BP: 115/67 HR: 83  Site: upper right arm  Mode: electronic  Orthostatic VS  11-14-22 @ 07:32  Lying BP: --/-- HR: --  Sitting BP: 143/84 HR: 80  Standing BP: 133/81 HR: 80  Site: upper right arm  Mode: electronic

## 2022-11-16 PROCEDURE — 99232 SBSQ HOSP IP/OBS MODERATE 35: CPT

## 2022-11-16 RX ADMIN — Medication 12.5 MILLIGRAM(S): at 20:52

## 2022-11-16 RX ADMIN — RISPERIDONE 2 MILLIGRAM(S): 4 TABLET ORAL at 09:39

## 2022-11-16 RX ADMIN — Medication 81 MILLIGRAM(S): at 09:39

## 2022-11-16 RX ADMIN — ATORVASTATIN CALCIUM 20 MILLIGRAM(S): 80 TABLET, FILM COATED ORAL at 20:52

## 2022-11-16 RX ADMIN — Medication 12.5 MILLIGRAM(S): at 09:39

## 2022-11-16 RX ADMIN — RISPERIDONE 2 MILLIGRAM(S): 4 TABLET ORAL at 20:52

## 2022-11-16 NOTE — BH TREATMENT PLAN - NSTXCOPEINTERPR_PSY_ALL_CORE
Encourage patient to actively participate in 1-2 psych rehab groups to improve coping skills.
Encourage patient to actively participate in 1-2 psych rehab groups to improve coping skills.

## 2022-11-16 NOTE — BH TREATMENT PLAN - NSTXPSYCHOGOAL_PSY_ALL_CORE
State that he/she is only about 50% sure of the certainty of the delusions instead of 100% certain
State that he/she is only about 50% sure of the certainty of the delusions instead of 100% certain

## 2022-11-16 NOTE — BH PATIENT PROFILE - NSPROMEDSPATCH_GEN_A_NUR
CARDIOLOGY CLINIC NOTE   DATE:  10/3/2022    PCP: Oh Gomez MD    HISTORY:  Isa Jones is a 69 year old male with history of severe nonischemic cardiomyopathy with chronic congestive heart failure, hypertension, hyperlipidemia and obesity.     ASSESSMENT/PLAN:   1. Severe nonischemic-dilated cardiomyopathy. ORAW80-26% with severely increased LV cavity size(8.5 cm end diastole).   - Limited Echo in 5/2014:  LVEF 25%   - Echo 2/26/2019:  Severely increased left ventricular cavity size(LV MAIKOL 8.2 cm). Mild LVH.  Essentially akinetic anterior/anteroseptal walls, inferior/inferolateral walls. Other walls severely hypokinetic.  Overall severe left ventricular systolic dysfunction. Left ventricular ejection fraction, 18 %.  Grade I/IV diastolic dysfunction.  Mildly increased left atrial size. Normal size right atrium and right ventricle, with normal RV systolic function  - Echo 12/3/19:  Severely increased left ventricular cavity size. Mild left ventricular hypertrophy.  Essentially akinetic anterior/anteroseptal walls, inferior/inferolateral walls.  Other walls of variable degrees of hypokinesis.  Overall severe left ventricular systolic dysfunction.   Estimated left ventricular ejection fraction 20-25%.  Grade I/IV diastolic dysfunction .  Mildly increased left atrial size. Normal right atrial size.  Normal right ventricular size and systolic function.  Mild mitral annular calcification.  Mild mitral valve and aortic valve sclerosis without stenosis.  Mild-moderate MV regurgitation. Trace TV regurgitation. Mild PV regurgitation.  Unable to estimate PA pressure.  Mildly dilated aortic root, 4.1 cm. Mildly dilated proximal ascending aorta, 4.2 cm.  Pacemaker/ICD catheter(s) visualized in the right atrium and right ventricle.  When compared [id number] with 2/26/2019:  Overall no significant change.  - Limited Echo 6/2/20:   Severely increased left ventricular cavity size.  Mild left ventricular hypertrophy with  heavy apical trabeculations.  Severely decreased left ventricular systolic function is globallty depressed -LVEF, 25 %.  Diastolic dysfunction.  Normal right ventricular size and systolic function. Mildly increased left atrial size.  Mild mitral valve regurgitation. No pericardial effusion.  No significant change since the prior study 12/03/2019 by direct comparison. Less MR on today's study.  - Echo 3/18/21:  Severe LV enlargement with severe global LV systolic dysfunction. EF approx 25%.   Diastolic dysfunction with elevated left sided filling pressures.  Normal right ventricular size and systolic function. RV lead is seen.  Unremarkable valvular structures.  Incomplete TR spectral Doppler envelope precludes accurate assessment of PASP.  - Echo 09/02/2022:  Definity contrast administered to better visualize endocardial definition.  Marked left ventricular enlargement. Severe global left ventricular systolic dysfunction with near akinesis of the ventricular septum.  Left ventricular ejection fraction; 23 %.  Grade I diastolic dysfunction of the left ventricle (impaired relaxation pattern).  Normal right ventricular size and systolic function.  Moderate left atrial enlargement.  Normal right atrial chamber size.  Moderate mitral annular calcification.  Mild mitral valve sclerosis with tethered posterior leaflet.  Mild to moderate, perhaps moderate, eccentric mitral valve regurgitation.  Mild aortic valve sclerosis without stenosis or regurgitation.  No significant tricuspid valve regurgitation seen. Unable to estimate PA pressure.  Moderately dilated sinus of Valsalva, 4.4 cm. Moderately dilated proximal ascending aorta, 4.5 cm.  Mildly dilated IVC.  Trivial pericardial effusion.  Pacemaker/ICD wires present in right chambers.  Compared with 03/18/2021:.  Left ventricular size and systolic function is similar.  Mitral valve regurgitation appears worse.  - BiV-ICD placed 12/19/13 followed by Dr Epperson. Follows up w/  Nicanor.  - ICD Bi Ventric Generator Replaced 5/5/21 with Dr. Vick.  - Noncompliant with follow-up in advanced CHF clinic in the past.  - Continue current medications. Continue CPAP(repeat sleep study). Good diet and fluid and salt restriction.  - On Carvedilol. Previously on ARB/?ACE, which I believe was stopped secondary to worsening renal function.  Currently on low-dose Demadex.  - Follow-up with Camille Rosen NP, See Dr. Porras, in Advanced [facility]F clinic.  - Repeat Echocardiogram soon. Follow-up BMP and ProBNP soon.  2. Chronic systolic congestive heart failure, Class III-IV.  -EKG 09/01/2022:  Atrial-sensed ventricular-paced rhythm with occasional premature ventricular complexes  Abnormal ECG  When compared with ECG of 01-SEP-2022 07:20,  premature ventricular complexes are now present  aberrant conduction is no longer present  Vent. rate has increased BY 14 BPM  - Previously less SUN with Bi-ventricular pacing.  No worsening SUN.  - ICD Bi-Abdirahman Generator Replaced 5/5/21 with Dr. Vick  - Follow-up on labs.  - Follow-up Echocardiogram soon.  - Follow-up with CHF clinic.  3. Hyperlipidemia   - Adequate control.   - Followed by PCP.   4. Sleep Apnea  - Patient states that he's not been wearing CPAP for a year. Snores and apnea per wife.  - Needs to get back on CPAP every night and all night.  5. Cholangiocarcinoma  - Plans per oncology.    RECOMMEND:  Check BMP and proBNP.  Consider adjustment of Demadex.  Continue carvedilol.  Follow up with Camille Rosen NP/Dr. Porras in CHF clinic.  Low-sodium intake.  Restricted fluid intake.  Follow-up with Dr. Vick and pacemaker/ICD clinic.  Labs soon.   Echo in *** months   Follow up with Sleep study and get appropriate CPAP equipment and wear every and all night.  RTC 4 months.  ________________________________________________     SUBJECTIVE:  Isa Jones 69 year old male here for a 1 month follow up. Today he states that he feels \"***\". 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    Patient presented in ED on 09/01/2022 for complaints of SOB. See below.     Pt presented in ED on 09/15/2022 -09/17/2022 with pneumonia. He was not undergoing chemotherapy at this time. He was discharged on a high dose of torsemide 10 mg 1 tab mondays and Friday's.       He was not able to tolerate his CPAP during this time frame.  He did have a sleep study scheduled on 09/23/2022 and it was cancelled by that department to adjust his CPAP equipment.     Patient was advised to decrease Torsemide to 0.5 mg on Mondays and Friday's and to take 1 tablet  The other 5 days of the week. No change in digoxin. Check B/B in 4 weeks and dig in 3 months.     Patient is scheduled to follow up with Dr. Nicanor YODER on 03/20/2023. Last device check was on 05/20/2022.       Patient states that his SOB is worse then last visit. It is difficult for him to walk long distances without having to take breaks. He is requesting a wheel chair for on the way out of today's visit due to the amount of SOB he is experiencing at today's visit. He states that he still uses his inhaler as needed and it helps.     His wife is noticing him taking breaks of breathing while asleep for a few seconds at a time. Wife states that this is not occurring as much.     He states that when he is walking too fast he can feel palpitations that last until he sits down and catches his breathe. He experiences this while laying down at times. This occurs when he's having to move quickly.     Left leg swelling that comes and goes depending on activity or day.     He has a sleep study coming up on the Sept. 23rd, for his CPAP to be readjusted and fitted for his face after loosing a lot of weight, he states he does not wear his CPAP due to this.     Denies Chest pain, Dizziness, Syncope, Ankle Edema, Claudication., fatigue.     Recent Hospitalizations: 09/01/2022  Pt presented with acute hypoxic respiratory failure felt to be secondary to community-acquired  pneumonia setting of severe dilated nonischemic cardiomyopathy.  He was placed on azithromycin and ceftriaxone.  He showed clinical improvement some.  Also during the hospitalization he did offload about 5 L of fluid which contributed to his improvement.  Echocardiogram was performed showing a dilated IVC suggesting mild fluid overload state.  More aggressive diuresis was then instituted with IV diuresis and the next day patient felt even better.  I will not continue antibiotics at this time due to this.  His torsemide dose was increased from 5 mg daily to 10 mg daily.  He was instructed to follow-up with Heart failure Clinic soon. If his weight increases dramatically he will call Heart failure Clinic to get recommendations.  09/15/2022-09/17/2022 Patient presented in ED with Pneumonia. He presented with shortness of breathe On the day of admission his shortness of breath worsened to the point where he was unable to catch his breath, position did not make a difference, associated with sweating. EMS was called and when they arrived, the patient was on oxygen 73% on room air and blood pressure was 195/111. They started him on high flow oxygen, gave 3 albuterol treatments, sublingual nitroglycerin tab and dexamethasone; the patient was unable to tolerate CPAP.       Exercise: No routine, active in the house.   Sleep: Okay, Has not been wearing  Wife does notice snoring and she has noticed apnea.   Appetite: Good, watching sodium intake    Caffeine: 2 cups of coffee per day   ETOH: None     PHYSICAL EXAM:  There were no vitals taken for this visit.282 lbs   Neck: Supple. No JVD. 2+ Carotid pulses  Lungs: Resp effort good, crackles left lower half and right base  Heart: RRR, normal S1S2 without S3 or murmur  Abd: Positive BS, soft, nontender   Ext: Trace edema BL, 1-2+DP/PT pulses     DIAGNOSTIC LABS/DATA:    Echo 09/02/2022:  Definity contrast administered to better visualize endocardial definition.  Marked left  ventricular enlargement. Severe global left ventricular systolic dysfunction with near akinesis of the ventricular septum.  Left ventricular ejection fraction; 23 %.  Grade I diastolic dysfunction of the left ventricle (impaired relaxation pattern).  Normal right ventricular size and systolic function.  Moderate left atrial enlargement.  Normal right atrial chamber size.  Moderate mitral annular calcification.  Mild mitral valve sclerosis with tethered posterior leaflet.  Mild to moderate, perhaps moderate, eccentric mitral valve regurgitation.  Mild aortic valve sclerosis without stenosis or regurgitation.  No significant tricuspid valve regurgitation seen. Unable to estimate PA pressure.  Moderately dilated sinus of Valsalva, 4.4 cm. Moderately dilated proximal ascending aorta, 4.5 cm.  Mildly dilated IVC.  Trivial pericardial effusion.  Pacemaker/ICD wires present in right chambers.  Compared with 03/18/2021:.  Left ventricular size and systolic function is similar.  Mitral valve regurgitation appears worse.    EKG 09/01/2022:   Atrial-sensed ventricular-paced rhythm with occasional premature ventricular complexes  Abnormal ECG  When compared with ECG of 01-SEP-2022 07:20,  premature ventricular complexes are now present  aberrant conduction is no longer present  Vent. rate has increased BY 14 BPM      Chest CT 4/13/22:  IMPRESSION: Decreased density in the left lobe of the liver as described  compatible with the patient's known cholangiocarcinoma.  Stable nonenlarged para-aortic lymph nodes. Evaluation of mesenteric and  sindy hepatis lymph nodes is limited without intravenous contrast.  Tiny pulmonary parenchymal nodules right and left upper lobe similar to the  previous study. No evidence of progression of metastatic disease in the chest.  Calcified mediastinal and hilar lymph nodes and scattered calcified granulomata in both lungs.    EKG 9/14/2021:        Electronic ventricular pacemaker   Pacemaker ECG, No  further analysis   INSUFFICIENT DATA    EKG 8/15/21:      Echo 3/18/21:  Severe LV enlargement with severe global LV systolic dysfunction. EF approx 25% Diastolic dysfunction with elevated left sided filling  pressures.  Normal right ventricular size and systolic function. RV lead is seen.  Unremarkable valvular structures.  Incomplete TR spectral Doppler envelope precludes accurate assessment of PASP.    Limited Echo 6/2/20:  LIMITED ECHOCARDIOGRAM  Severely increased left ventricular cavity size.  Mild left ventricular hypertrophy with heavy apical trabeculations.  Severely decreased left ventricular systolic function is globallty depressed - left ventricular ejection fraction, 25 %.  Diastolic dysfunction.  Normal right ventricular size and systolic function.  Mildly increased left atrial size.  Mild mitral valve regurgitation.  No pericardial effusion.  No significant change since the prior study 12/03/2019 by direct comparison. Less MR on today's study.    US Aorta 2/11/20:  IMPRESSION:  No areas of aneurysmal dilatation within the visualized abdominal aorta.  Negative - No infrarenal abdominal aortic aneurysm.  Recommendations: No followup needed.    Echo 12/3/19:  Severely increased left ventricular cavity size. Mild left ventricular hypertrophy.  Essentially akinetic anterior/anteroseptal walls, inferior/inferolateral walls.  Other walls of variable degrees of hypokinesis.  Overall severe left ventricular systolic dysfunction. Estimated left ventricular ejection fraction 20-25%.  Grade I/IV diastolic dysfunction .  Mildly increased left atrial size. Normal right atrial size.  Normal right ventricular size and systolic function.  Mild mitral annular calcification.  Mild mitral valve and aortic valve sclerosis without stenosis.  Mild-moderate MV regurgitation. Trace TV regurgitation. Mild PV regurgitation.  Unable to estimate PA pressure.  Mildly dilated aortic root, 4.1 cm. Mildly dilated proximal ascending  aorta, 4.2 cm.  Pacemaker/ICD catheter(s) visualized in the right atrium and right ventricle.  When compared [id number] with 2/26/2019:  Overall no significant change.    ECHO 2/26/19:  Severely increased left ventricular cavity size(LV MAIKOL 8.2 cm). Mild LVH.  Essentially akinetic anterior/anteroseptal walls, inferior/inferolateral walls.  Other walls severely hypokinetic.  Overall severe left ventricular systolic dysfunction. Left ventricular ejection fraction, 18 %.  Grade I/IV diastolic dysfunction.  Mildly increased left atrial size.  Normal size right atrium and right ventricle, with normal RV systolic function.  Mild mitral annular calcification.  Mild mitral valve and aortic valve sclerosis without stenosis.  Mild-moderate MV regurgitation. Mild PV regurgitation.  Unable to estimate PA pressure.  Mildly dilated aortic root, 4.1 cm. Mildly dilated proximal ascending aorta, 4.2 cm.  Mild-moderate IVC enlargement.  Mildly dilated pulmonary artery.  Pacemaker/ICD catheter(s) visualized in the right atrium and right ventricle.  Compared with 10/09/2017:  No significant change.    EKG 1/1/18:  AV sequential or dual chamber electronic pacemaker   Sinus tachycardia with Atrial sensing and ventricular pacing   When compared with ECG of 25-OCT-2016 13:58,   no significant change    ECHO:10/9/2017  Definity contrast was utilized to better visualize the endocardial definition.  Severely increased left ventricular cavity size(LVEDD 8.2 cm). Mild left ventricular hypertrophy.  Severe global left ventricular hypokinesis, w/regional wall variabilities.  Left ventricular ejection fraction, 17 %.  Grade I/IV diastolic dysfunction .  Mildly increased left atrial size. Normal size right atrium.  Right ventricle not well visualized. Appears to be of normal size with normal systolic function.  Aortic valve difficult to visualize. No aortic valve stenosis or regurgitation seen.  Mild antral valve annular calcification. Mild  mitral valve sclerosis without stenosis.  Mild-moderate MV regurgitation. Trace TV and PV regurgitation.  Unable to estimate PA pressure.  Pacemaker/ICD catheters visualized in the right atrium and right ventricle.  Mildly dilated IVC.  Mildly dilated aortic root, 4.1 cm.  Compared with 9/15/2016:  Left ventricle is larger, with similar poor function.    SLEEP STUDY 5/23/2016:  A CPAP/BiPAP titration study suggests utilization of an auto  titrate CPAP unit incorporating a set pressure of 5-15 cm.  This  study does not indicate the need for BiPAP.  ResMed large full  face Quatro Air CPAP mask toleration was good.  Clinical  correlation is advised.    LABS:  Recent Labs   Lab 09/21/22  1600 09/03/22  0415 09/03/22  0052 09/02/22  0415 09/01/22  0744 09/01/22  0734 08/30/22  1632 08/12/22  1623 07/08/22  1613 04/22/22  1430 04/15/22  1534 04/05/22  1438 11/30/21  1504 11/03/21  0950   HGB  --  9.6*  --  8.7*  --  9.3* 9.0* 8.3* 8.4*  --    < > 9.3*   < > 9.3*   BUN 41* 23*  --  20 21*  --  30* 21* 29* 25*   < > 33*   < > 23*   Creatinine 2.93* 2.24*  --  2.20* 2.58*  --  2.62* 2.41* 2.47* 2.31*   < > 2.70*   < > 2.13*   Hemoglobin A1C  --   --   --   --   --   --   --   --   --   --   --  5.9*  --   --    Potassium 3.9 3.8 3.9 3.9 4.3  --  4.6 4.0 4.5 4.5   < > 4.2   < > 3.9   NT-proBNP 3,557*  --   --   --  6,421*  --  4,626*  --   --  6,489*  --   --   --   --    GOT/AST  --  12  --  13 15  --   --  15 16  --    < > 20   < > 13   GPT  --  12  --  10 11  --   --  13 14  --    < > 14   < > 10   INR  --   --   --   --   --   --   --   --   --   --   --   --   --  1.2   Digoxin 1.0  --   --   --   --   --  1.5  --   --  1.1  --   --   --   --    LDL Direct  --   --   --   --   --   --   --   --   --   --   --  94  --   --     < > = values in this interval not displayed.       ALLERGIES:  Losartan, Metformin, and Tramadol    Current Outpatient Medications   Medication Sig   • oxyCODONE, IMM REL, (ROXICODONE) 5 MG  immediate release tablet Take 1 tablet by mouth every 4 hours as needed for Pain.   • DULoxetine (CYMBALTA) 30 MG capsule Take 1 capsule by mouth daily.   • mirtazapine (REMERON) 15 MG tablet Take 1 tablet by mouth nightly. Take when taking the chemo pill   • torsemide (DEMADEX) 10 MG tablet Take 0.5 tablet by mouth on Mondays and Fridays AND take 1 tablet by mouth the other 5 days of the week.   • zolpidem (AMBIEN) 10 MG tablet Take 1 tablet by mouth at bedtime as needed for Sleep.   • digoxin (LANOXIN) 0.125 MG tablet Take 1 tablet by mouth 4 days a week only. Or as directed.   • acetaminophen (TYLENOL) 325 MG tablet Take 650 mg by mouth every 4 hours as needed for Pain.   • oxyCODONE ER (Xtampza ER) 9 MG Capsule Extended Release 12 hour Abuse-Deterrent Take 1 capsule by mouth in the morning and 1 capsule in the evening.   • naLOXone (NARCAN) 4 MG/0.1ML nasal spray Spray the content of 1 device into 1 nostril. Call 911. May repeat with 2nd device in alternate nostril if no response in 2-3 minutes.   • Sennosides (Senna) 8.6 MG Tab Take 1 tablet once daily when taking pain medication to prevent constipation. Can take up to 2 tabs three times daily to keep one soft bowel movement per day   • rivaroxaban (XARELTO) 15 MG Tab Take 1 tablet by mouth daily (with dinner).   • ondansetron (ZOFRAN) 4 MG tablet TAKE 1 TABLET BY MOUTH EVERY 8 HOURS AS NEEDED FOR NAUSEA   • finasteride (PROSCAR) 5 MG tablet TAKE 1 TABLET EVERY DAY   • albuterol 108 (90 Base) MCG/ACT inhaler INHALE 2 PUFFS INTO THE LUNGS EVERY 4 HOURS AS NEEDED FOR SHORTNESS OF BREATH OR WHEEZING.   • tamsulosin (FLOMAX) 0.4 MG Cap TAKE 1 CAPSULE DAILY 30 MINUTES AFTER EVENING MEAL.   • carvedilol (COREG) 25 MG tablet TAKE 1 TABLET TWICE DAILY WITH MEALS   • True Metrix Blood Glucose Test test strip TEST BLOOD SUGAR ONE TIME DAILY   • amLODIPine (NORVASC) 5 MG tablet Take 1 tablet by mouth once daily   • Lidocaine 5 % Cream Apply 2 g topically 5 times daily.  Apply up to 5 times daily as needed   • pantoprazole (PROTONIX) 40 MG tablet Take 1 tablet by mouth every 12 hours.   • lactulose (CHRONULAC) 10 GM/15ML solution Take 30 mLs by mouth 2 times daily. (Patient taking differently: Take 20 g by mouth 2 times daily as needed.)   • Blood Glucose Monitoring Suppl (True Metrix Meter) Device 1 each as directed. Use once daily. DX: E11.9   • TRUEplus Lancets 30G Misc USE ONCE DAILY. DX: E11.9   • Alcohol Swabs (B-D SINGLE USE SWABS REGULAR) Pads USE ONCE DAILY. DX :E11.9   • ondansetron (ZOFRAN ODT) 8 MG disintegrating tablet Place 1 tablet onto the tongue every 8 hours as needed for Nausea.   • Garlic (GARLIQUE PO) Take 1 tablet by mouth daily.   • Multiple Vitamins-Minerals (Multivitamin Adults) Tab Take 1 tablet by mouth daily.     No current facility-administered medications for this visit.          none

## 2022-11-16 NOTE — BH INPATIENT PSYCHIATRY PROGRESS NOTE - NSBHCHARTREVIEWVS_PSY_A_CORE FT
Vital Signs Last 24 Hrs  T(C): 36.4 (11-16-22 @ 07:57), Max: 36.4 (11-16-22 @ 07:57)  T(F): 97.5 (11-16-22 @ 07:57), Max: 97.5 (11-16-22 @ 07:57)  HR: --  BP: --  BP(mean): --  RR: 18 (11-16-22 @ 07:57) (18 - 18)  SpO2: 100% (11-16-22 @ 07:57) (100% - 100%)    Orthostatic VS  11-16-22 @ 07:57  Lying BP: --/-- HR: --  Sitting BP: 111/66 HR: 89  Standing BP: 101/65 HR: 91  Site: upper right arm  Mode: electronic  Orthostatic VS  11-15-22 @ 07:40  Lying BP: --/-- HR: --  Sitting BP: 117/61 HR: 58  Standing BP: 115/67 HR: 83  Site: upper right arm  Mode: electronic

## 2022-11-16 NOTE — BH INPATIENT PSYCHIATRY PROGRESS NOTE - NSBHMSESPABN_PSY_A_CORE
Loud volume/Increased productivity/Impaired articulation

## 2022-11-16 NOTE — BH TREATMENT PLAN - NSTXMANICGOAL_PSY_ALL_CORE
Exhibit a substantial reduction in elated/angry acting out, and pressured speech that prevents mutual conversation
Exhibit a substantial reduction in elated/angry acting out, and pressured speech that prevents mutual conversation

## 2022-11-16 NOTE — BH PATIENT PROFILE - ALLERGIES
Allergies:-  penicillin  Haldol  cinnamon  Mayonnaise  <Other>  penicillins  Haldol  penicillin  Haldol  <Other>  penicillin

## 2022-11-16 NOTE — BH TREATMENT PLAN - NSTXPLANTHERAPYSESSIONSFT_PSY_ALL_CORE
11-14-22  Type of therapy: N/A  Type of session: N/A  Level of patient participation: Resistance to participation  Duration of participation: 0  Therapy conducted by: Psych rehab  Therapy Summary: Patient declined to attend group programming although encouraged.  
  11-14-22  Type of therapy: N/A  Type of session: N/A  Level of patient participation: Resistance to participation  Duration of participation: 0  Therapy conducted by: Psych rehab  Therapy Summary: Patient declined to attend group programming although encouraged.

## 2022-11-16 NOTE — BH TREATMENT PLAN - NSTXDCOTHRGOAL_PSY_ALL_CORE
Patient will be referred to the appropriate level of outpatient treatment and have appointments within 3-5 days of discharge.   will explore need for duel diagnosis tx with appointments if needed.  Benefits in place
Patient will be referred to the appropriate level of outpatient treatment and have appointments within 3-5 days of discharge.   will explore need for duel diagnosis tx with appointments if needed.  Benefits in place

## 2022-11-16 NOTE — BH TREATMENT PLAN - NSTXPSYCHOINTERRN_PSY_ALL_CORE
Patient will state that he is only 50% sure of the certainty of the delusions instead of 100% sure.
Patient will state that he is only 50% sure of the certainty of the delusions instead of 100% sure.

## 2022-11-17 LAB — SARS-COV-2 RNA SPEC QL NAA+PROBE: SIGNIFICANT CHANGE UP

## 2022-11-17 PROCEDURE — 99232 SBSQ HOSP IP/OBS MODERATE 35: CPT

## 2022-11-17 RX ADMIN — BENZOCAINE AND MENTHOL 1 LOZENGE: 5; 1 LIQUID ORAL at 09:19

## 2022-11-17 RX ADMIN — Medication 81 MILLIGRAM(S): at 09:18

## 2022-11-17 RX ADMIN — RISPERIDONE 2 MILLIGRAM(S): 4 TABLET ORAL at 09:19

## 2022-11-17 RX ADMIN — Medication 12.5 MILLIGRAM(S): at 09:18

## 2022-11-17 RX ADMIN — RISPERIDONE 2 MILLIGRAM(S): 4 TABLET ORAL at 20:37

## 2022-11-17 RX ADMIN — Medication 12.5 MILLIGRAM(S): at 20:10

## 2022-11-17 RX ADMIN — ATORVASTATIN CALCIUM 20 MILLIGRAM(S): 80 TABLET, FILM COATED ORAL at 20:10

## 2022-11-17 NOTE — BH INPATIENT PSYCHIATRY PROGRESS NOTE - NSBHCHARTREVIEWVS_PSY_A_CORE FT
Vital Signs Last 24 Hrs  T(C): 36.3 (11-17-22 @ 07:32), Max: 36.3 (11-17-22 @ 07:32)  T(F): 97.3 (11-17-22 @ 07:32), Max: 97.3 (11-17-22 @ 07:32)  HR: --  BP: --  BP(mean): --  RR: 16 (11-17-22 @ 07:32) (16 - 16)  SpO2: 98% (11-17-22 @ 07:32) (98% - 98%)    Orthostatic VS  11-17-22 @ 07:32  Lying BP: --/-- HR: --  Sitting BP: 130/79 HR: 99  Standing BP: 123/85 HR: 89  Site: upper right arm  Mode: electronic  Orthostatic VS  11-16-22 @ 07:57  Lying BP: --/-- HR: --  Sitting BP: 111/66 HR: 89  Standing BP: 101/65 HR: 91  Site: upper right arm  Mode: electronic

## 2022-11-17 NOTE — BH DISCHARGE NOTE NURSING/SOCIAL WORK/PSYCH REHAB - NSDPDISTO_PSY_ALL_CORE
Homeless shelter Patient has been placed by [facility] at: [address]  [address]/[facility] shelter

## 2022-11-17 NOTE — BH DISCHARGE NOTE NURSING/SOCIAL WORK/PSYCH REHAB - NSDCPRRECOMMEND_PSY_ALL_CORE
Patient to follow aftercare recommendations and attend appointments set up by treatment team for continued care.

## 2022-11-17 NOTE — BH DISCHARGE NOTE NURSING/SOCIAL WORK/PSYCH REHAB - NSDCPEWEB_GEN_ALL_CORE
[facility] for Tobacco Control website --- [email or web address] website --- [email or web address]

## 2022-11-17 NOTE — BH DISCHARGE NOTE NURSING/SOCIAL WORK/PSYCH REHAB - PATIENT PORTAL LINK FT
You can access the FollowMyHealth Patient Portal offered by [facility] by registering at the following website: [email or web address]. By joining [employer]’s FollowMyHealth portal, you will also be able to view your health information using other applications (apps) compatible with our system.

## 2022-11-17 NOTE — BH DISCHARGE NOTE NURSING/SOCIAL WORK/PSYCH REHAB - NSBHCRISISRESOURCESOTHER_PSY_ALL_CORE_FT
[facility] 5N Nurse Manager, Alex Rogers (([phone]) [facility] 5N Nurse Manager, Alex Boateng (([phone])

## 2022-11-17 NOTE — BH DISCHARGE NOTE NURSING/SOCIAL WORK/PSYCH REHAB - NSDCPLANREVIEWED_PSY_ALL_CORE
The patient agrees to receive a copy of the discharge not through the patient portal./Yes The discharge note was reviewed with the patient and the patient has agreed to receive a copy of the discharge note through the patient portal./Yes

## 2022-11-17 NOTE — BH DISCHARGE NOTE NURSING/SOCIAL WORK/PSYCH REHAB - NSDCADDINFO1FT_PSY_ALL_CORE
Patient has a TELEHEALTH appointment for intake for continuing in outpatient psychiatric treatment with therapist, Ines Avalos, at [facility] on Tuesday, 11/22/2022 at 12:00 noon. If you do not have access to a phone or to a computer, you can go in person to [facility] and they will assist you.

## 2022-11-17 NOTE — BH DISCHARGE NOTE NURSING/SOCIAL WORK/PSYCH REHAB - NSCDUDCCRISIS_PSY_A_CORE
.National Suicide Prevention Lifeline 3 [phone]) 258-8293/.  [employer]et  1 (675) LIFENET (295-8903)/.  [facility] [place]  [phone] 244-7617/.  [facility]  [address] [place] [address]  [phone]   Hours:  Monday through Friday from 9 AM to 3 PM/Other.../988 Suicide and Crisis Lifeline

## 2022-11-17 NOTE — BH DISCHARGE NOTE NURSING/SOCIAL WORK/PSYCH REHAB - NSDCPEEMAIL_GEN_ALL_CORE
[facility] for Tobacco Control email [email or web address]

## 2022-11-18 PROCEDURE — 99232 SBSQ HOSP IP/OBS MODERATE 35: CPT

## 2022-11-18 RX ADMIN — RISPERIDONE 2 MILLIGRAM(S): 4 TABLET ORAL at 20:25

## 2022-11-18 RX ADMIN — Medication 12.5 MILLIGRAM(S): at 09:26

## 2022-11-18 RX ADMIN — RISPERIDONE 2 MILLIGRAM(S): 4 TABLET ORAL at 09:26

## 2022-11-18 RX ADMIN — Medication 50 MILLIGRAM(S): at 20:25

## 2022-11-18 RX ADMIN — Medication 12.5 MILLIGRAM(S): at 20:25

## 2022-11-18 RX ADMIN — Medication 81 MILLIGRAM(S): at 09:26

## 2022-11-18 RX ADMIN — ATORVASTATIN CALCIUM 20 MILLIGRAM(S): 80 TABLET, FILM COATED ORAL at 20:25

## 2022-11-18 RX ADMIN — BENZOCAINE AND MENTHOL 1 LOZENGE: 5; 1 LIQUID ORAL at 09:26

## 2022-11-18 NOTE — BH INPATIENT PSYCHIATRY DISCHARGE NOTE - NSBHMETABOLIC_PSY_ALL_CORE_FT
BMI: BMI (kg/m2): 24.1 (11-11-22 @ 00:52)  HbA1c: A1C with Estimated Average Glucose Result: 5.6 % (11-11-22 @ 09:00)    Glucose:   BP: --  Lipid Panel: Date/Time: 11-12-22 @ 08:36  Cholesterol, Serum: 234  Direct LDL: --  HDL Cholesterol, Serum: 51  Total Cholesterol/HDL Ration Measurement: --  Triglycerides, Serum: 76

## 2022-11-18 NOTE — BH INPATIENT PSYCHIATRY DISCHARGE NOTE - OTHER PAST PSYCHIATRIC HISTORY (INCLUDE DETAILS REGARDING ONSET, COURSE OF ILLNESS, INPATIENT/OUTPATIENT TREATMENT)
Patient was admitted to  via the ED where he was bib scpd from the [facility] shelter, pt reports due to a cough. Patient presents as uncooperative, disorganized with grandiose delusional thinking that he is a doctor and has 23 degrees. Speech is loud at times, difficult to understand with psychotic content. He does report prior psychiatric admissions, the last at [facility] in July 2022, but states that he is not currently in psychiatric outpatient treatment.

## 2022-11-18 NOTE — BH INPATIENT PSYCHIATRY DISCHARGE NOTE - NSDCMRMEDTOKEN_GEN_ALL_CORE_FT
aspirin 81 mg oral tablet, chewable: 1 tab(s) orally once a day  atorvastatin 20 mg oral tablet: 1 tab(s) orally once a day (at bedtime)  metoprolol tartrate 25 mg oral tablet: 1 tab(s) orally once a day   nicotine 14 mg/24 hr transdermal film, extended release: 1 patch transdermal once a day   risperiDONE 2 mg oral tablet: 1 tab(s) orally 2 times a day  traZODone 50 mg oral tablet: 1 tab(s) orally once a day (at bedtime), As needed, insomnia

## 2022-11-18 NOTE — BH INPATIENT PSYCHIATRY DISCHARGE NOTE - NSICDXPASTSURGICALHX_GEN_ALL_CORE_FT
PAST SURGICAL HISTORY:  AAA (abdominal aortic aneurysm) Endoscopic repair    H/O hernia repair     H/O knee surgery bilateral    H/O left knee surgery 1980    H/O neck surgery 2007    H/O right knee surgery 1990    History of left knee surgery     History of open heart surgery     History of right knee surgery

## 2022-11-18 NOTE — BH INPATIENT PSYCHIATRY DISCHARGE NOTE - HPI (INCLUDE ILLNESS QUALITY, SEVERITY, DURATION, TIMING, CONTEXT, MODIFYING FACTORS, ASSOCIATED SIGNS AND SYMPTOMS)
67 year-old M, single, on SSI due to disability, lives in a [facility] shelter, with PMH of HTN, infra-renal AAA, PSH of hernia repair, bilateral knee surgery, neck surgery, and open heart surgery, past psychiatric history of schizophrenia (paranoid type; noncompliant on medications), hx of prior psychiatric hospitalizations (most recentlya at [place] for 27d, discharged July 2022), no prior suicide attempts who presents to the ED BIB by police for psych evaluation in the setting of making statements expressing SI at [facility]. Per ED, "patient denies making statements, denies SI/HI at this time.  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  1:1 initiated." Telepsychiatry consulted for mental health evaluation. No psychiatric PRNs or acute behavioral events reported. Per ED staff, patient was refusing to answer interview questions in the ED.     Chart reviewed, patient seen and examined in a private room on 1:1 using a telepsychiatry cart. On approach, patient had arms crossed, eyebrows furrowed, and appeared irritable and argumentative with a high volume on interview. Patient would intermittently either refused to answer interview questions or give illogical answers to questions. Patient would often punctuate his speech with an angry and exasperated, "OK!?," while quickly nodding his head while he was speaking on interview. Patient states, "I am here for a cough." Patient states that he has had a cough for the past 2-3 weeks," then began stating that his social security check is months overdue. Patient ultimately stated that he has been trying to come to the hospital for the past week, but didn't have money for a bus pas. Patient initially claimed that he brought himself into the ED, but ultimately stated that "the police brought me here" and that they "just sprung on me," but declined to provide additional information. Patient states that he wants an aspirin or a cough drop. Patient states, "I'm an educated man with 27 degrees" and claimed that he has a medical degree, a pharmacy degree, a [employer] degree, and a dental degree. 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 Patient endorsed a prior psychiatric hospitalization, but declined to provide additional details. Patient claimed that he has a large FDC from [employer] which is reportedly overdue, and which reportedly includes real estate.     Patient denies suicidal ideation, intent, or plan at time of interview. Denies prior SI or SA. Denies HI. Denies any prior NSSIB.  Denied acute symptoms of depression, but complained of poor quality sleep "all my life" and decreased appetite chronically. Patient when asked about decreased sleep need in the context of brent, patient claimed that he has a lot of energy when he eats food, but states that he has not had money to eat recently. Declined to answer additional questions regarding brent symptoms. Denied acute symptoms of PTSD. Declined AH/VH. When asked about paranoid ideation, states that sometimes he thinks people are out to kill him (declined to explain who) but stated that he is not going anywhere. At some point during interview, patient made comment about not liking talking to "you people" - when asked what he meant, patient stated that he was attracted to women and complained of living in a homeless "shelter with men."     Substances  - Patient denies recent use of alcohol, nicotine, marijuana, or any illicit substances.    Psychiatric history:   - prior psychiatric Dx: denies; schizophrenia per SCHUYLER  - psychiatrist/therapist: denies   - prior psychiatric medications: initially denied, but then stated he was on haldol and cogentin before    Family psychiatric history: denies; appeared upset about being asked    Home medications: denies    Allergies (+rxn):   - endorses multiple food allergies - herrera, radishes, beets, coffee, cabbage, watermelon, cantaloupe, oatmeal   - endorses reported allergy to haldol and cogentin    Social history:   - states that he has been living in a shelter at [facility] for many years   - states that he is single, unmarried, but has "many children and grandchildren"     Legal history: claims "I've been incarcerated half my life" but declined to elaborate why (became irritable when asked)     Collateral: attempted to reach [facility] in [facility] at [phone], but not available due to after hours.

## 2022-11-18 NOTE — BH INPATIENT PSYCHIATRY DISCHARGE NOTE - REASON FOR ADMISSION
67 year-old M, single lives in a [facility] shelter, with PMH of HTN, infra-renal AAA, PSH of hernia repair, bilateral knee surgery, neck surgery, and open heart surgery, past psychiatric history of schizophrenia (paranoid type; noncompliant on medications), hx of prior psychiatric hospitalizations (most recentlya at [place] for 27d, discharged July 2022), no prior suicide attempts who presents to the ED BIB by police for psych evaluation in the setting of making statements expressing SI at [facility].

## 2022-11-18 NOTE — BH INPATIENT PSYCHIATRY DISCHARGE NOTE - HOSPITAL COURSE
Pt with improved goal directed speech.  Pt with denial of any hallucinations. He denies any suicidal or homicidal ideation intent or plan . Pt able to maintain behavioral control. and can be redirected.

## 2022-11-18 NOTE — BH INPATIENT PSYCHIATRY PROGRESS NOTE - NSBHCHARTREVIEWVS_PSY_A_CORE FT
Vital Signs Last 24 Hrs  T(C): 36.3 (11-18-22 @ 07:22), Max: 36.3 (11-18-22 @ 07:22)  T(F): 97.4 (11-18-22 @ 07:22), Max: 97.4 (11-18-22 @ 07:22)  HR: --  BP: --  BP(mean): --  RR: 16 (11-18-22 @ 07:22) (16 - 16)  SpO2: 100% (11-18-22 @ 07:22) (100% - 100%)    Orthostatic VS  11-18-22 @ 07:22  Lying BP: --/-- HR: --  Sitting BP: 124/64 HR: 70  Standing BP: 111/75 HR: 90  Site: upper right arm  Mode: electronic  Orthostatic VS  11-17-22 @ 07:32  Lying BP: --/-- HR: --  Sitting BP: 130/79 HR: 99  Standing BP: 123/85 HR: 89  Site: upper right arm  Mode: electronic   Vital Signs Last 24 Hrs  T(C): 36.3 (11-21-22 @ 07:31), Max: 36.3 (11-21-22 @ 07:31)  T(F): 97.4 (11-21-22 @ 07:31), Max: 97.4 (11-21-22 @ 07:31)  HR: --  BP: --  BP(mean): --  RR: 18 (11-21-22 @ 07:31) (18 - 18)  SpO2: 99% (11-21-22 @ 07:31) (99% - 99%)    Orthostatic VS  11-21-22 @ 07:31  Lying BP: --/-- HR: --  Sitting BP: 141/75 HR: 92  Standing BP: --/-- HR: --  Site: upper right arm  Mode: electronic  Orthostatic VS  11-20-22 @ 07:29  Lying BP: --/-- HR: --  Sitting BP: 105/64 HR: 94  Standing BP: 107/70 HR: 98  Site: upper right arm  Mode: electronic

## 2022-11-19 RX ADMIN — Medication 81 MILLIGRAM(S): at 09:53

## 2022-11-19 RX ADMIN — RISPERIDONE 2 MILLIGRAM(S): 4 TABLET ORAL at 20:33

## 2022-11-19 RX ADMIN — Medication 12.5 MILLIGRAM(S): at 09:53

## 2022-11-19 RX ADMIN — Medication 12.5 MILLIGRAM(S): at 20:32

## 2022-11-19 RX ADMIN — ATORVASTATIN CALCIUM 20 MILLIGRAM(S): 80 TABLET, FILM COATED ORAL at 20:33

## 2022-11-19 RX ADMIN — RISPERIDONE 2 MILLIGRAM(S): 4 TABLET ORAL at 09:53

## 2022-11-19 RX ADMIN — Medication 50 MILLIGRAM(S): at 20:32

## 2022-11-20 PROCEDURE — 99231 SBSQ HOSP IP/OBS SF/LOW 25: CPT

## 2022-11-20 RX ADMIN — ATORVASTATIN CALCIUM 20 MILLIGRAM(S): 80 TABLET, FILM COATED ORAL at 20:38

## 2022-11-20 RX ADMIN — RISPERIDONE 2 MILLIGRAM(S): 4 TABLET ORAL at 09:33

## 2022-11-20 RX ADMIN — Medication 12.5 MILLIGRAM(S): at 09:33

## 2022-11-20 RX ADMIN — Medication 81 MILLIGRAM(S): at 09:33

## 2022-11-20 RX ADMIN — RISPERIDONE 2 MILLIGRAM(S): 4 TABLET ORAL at 20:38

## 2022-11-20 RX ADMIN — Medication 12.5 MILLIGRAM(S): at 20:38

## 2022-11-20 NOTE — BH INPATIENT PSYCHIATRY PROGRESS NOTE - NSBHMSESPEECH_PSY_A_CORE
Normal volume, rate, productivity, spontaneity and articulation
Abnormal as indicated, otherwise normal...
Normal volume, rate, productivity, spontaneity and articulation
Normal volume, rate, productivity, spontaneity and articulation
Abnormal as indicated, otherwise normal...
Abnormal as indicated, otherwise normal...
Normal volume, rate, productivity, spontaneity and articulation

## 2022-11-20 NOTE — BH INPATIENT PSYCHIATRY PROGRESS NOTE - NSTXMANICINTERMD_PSY_ALL_CORE
pt on Risperdal for mood and affect stability 

## 2022-11-20 NOTE — BH INPATIENT PSYCHIATRY PROGRESS NOTE - NSTXCOPEINTERMD_PSY_ALL_CORE
encourage pt to attend groups  for insight and coping skills , pt is not believing that he has a mental disorder 

## 2022-11-20 NOTE — BH INPATIENT PSYCHIATRY PROGRESS NOTE - NSTXPSYCHOGOAL_PSY_ALL_CORE
State that he/she is only about 50% sure of the certainty of the delusions instead of 100% certain

## 2022-11-20 NOTE — BH INPATIENT PSYCHIATRY PROGRESS NOTE - CURRENT MEDICATION
MEDICATIONS  (STANDING):  aspirin  chewable 81 milliGRAM(s) Oral daily  atorvastatin 20 milliGRAM(s) Oral at bedtime  chlorproMAZINE    Injectable 50 milliGRAM(s) IntraMuscular once  metoprolol tartrate 12.5 milliGRAM(s) Oral two times a day  nicotine -  14 mG/24Hr(s) Patch 14 Patch Transdermal daily  risperiDONE   Tablet 2 milliGRAM(s) Oral two times a day    MEDICATIONS  (PRN):  benzocaine 15 mG/menthol 3.6 mG Lozenge 1 Lozenge Oral every 6 hours PRN Sore Throat  diphenhydrAMINE Injectable 50 milliGRAM(s) IntraMuscular every 6 hours PRN Menacing behavior  traZODone 50 milliGRAM(s) Oral at bedtime PRN insomnia  
MEDICATIONS  (STANDING):  aspirin  chewable 81 milliGRAM(s) Oral daily  atorvastatin 20 milliGRAM(s) Oral at bedtime  metoprolol tartrate 12.5 milliGRAM(s) Oral two times a day  nicotine -  14 mG/24Hr(s) Patch 14 Patch Transdermal daily  risperiDONE   Tablet 2 milliGRAM(s) Oral two times a day    MEDICATIONS  (PRN):  benzocaine 15 mG/menthol 3.6 mG Lozenge 1 Lozenge Oral every 6 hours PRN Sore Throat  traZODone 50 milliGRAM(s) Oral at bedtime PRN insomnia  
MEDICATIONS  (STANDING):  aspirin  chewable 81 milliGRAM(s) Oral daily  atorvastatin 20 milliGRAM(s) Oral at bedtime  chlorproMAZINE    Injectable 50 milliGRAM(s) IntraMuscular once  metoprolol tartrate 12.5 milliGRAM(s) Oral two times a day  nicotine -  14 mG/24Hr(s) Patch 14 Patch Transdermal daily  risperiDONE   Tablet 2 milliGRAM(s) Oral two times a day    MEDICATIONS  (PRN):  benzocaine 15 mG/menthol 3.6 mG Lozenge 1 Lozenge Oral every 6 hours PRN Sore Throat  diphenhydrAMINE Injectable 50 milliGRAM(s) IntraMuscular every 6 hours PRN Menacing behavior  traZODone 50 milliGRAM(s) Oral at bedtime PRN insomnia  
MEDICATIONS  (STANDING):  aspirin  chewable 81 milliGRAM(s) Oral daily  atorvastatin 20 milliGRAM(s) Oral at bedtime  metoprolol tartrate 12.5 milliGRAM(s) Oral two times a day  nicotine -  14 mG/24Hr(s) Patch 14 Patch Transdermal daily  risperiDONE   Tablet 2 milliGRAM(s) Oral two times a day    MEDICATIONS  (PRN):  benzocaine 15 mG/menthol 3.6 mG Lozenge 1 Lozenge Oral every 6 hours PRN Sore Throat  traZODone 50 milliGRAM(s) Oral at bedtime PRN insomnia

## 2022-11-20 NOTE — BH INPATIENT PSYCHIATRY PROGRESS NOTE - NSTXIMPULSINTERMD_PSY_ALL_CORE
pt on risperdal for mood and affect lability

## 2022-11-20 NOTE — BH INPATIENT PSYCHIATRY PROGRESS NOTE - NSICDXBHPRIMARYDX_PSY_ALL_CORE
Schizophrenia, paranoid type   F20.0  

## 2022-11-20 NOTE — BH INPATIENT PSYCHIATRY PROGRESS NOTE - NSBHFUPINTERVALHXFT_PSY_A_CORE
Pt claiming no hallucination .  Pt with improved goal directed speech.  pt is interactive but only select peers.  Pt denies any suicidal ideation intent or plan 
Pt with grandiose delusions, becomes irritable with reality testing.   Spoke with counselor at [facility] [phone]  pt reportedly was "aggressive verbally and threatening " but was not physically agitated . Ptbwas not on medication and has not been on medication for the past two years. 
Pt working on aftercare planning.  Pt was at [facility] and needed aftercare appts.  Pt denies any suicidal ideation intent or plan 
Pt is easily irritable and accusatory.  Pt refusing to get up out of bed  Pt declining any need to attend groups 
Pt wanting to be discharged and will be discharged tomorrow.  Pt denies any suicidal ideation intent or plan Pt with denial of ny side effects from medication
Pt with no behavioral issue .  Pt with goal direced speech. Pt with chronic delusions of having 23 diplomas and being qualified as a physician, ,  etc. 
Pt with improved eye contact Less irritable mood and affect is full range.   Pt with denial of any suicidal or homicidal ideation intent or plan

## 2022-11-20 NOTE — BH INPATIENT PSYCHIATRY PROGRESS NOTE - NSBHCHARTREVIEWVS_PSY_A_CORE FT
Vital Signs Last 24 Hrs  T(C): 36.4 (11-20-22 @ 07:29), Max: 36.4 (11-20-22 @ 07:29)  T(F): 97.5 (11-20-22 @ 07:29), Max: 97.5 (11-20-22 @ 07:29)  HR: --  BP: --  BP(mean): --  RR: 16 (11-20-22 @ 07:29) (16 - 16)  SpO2: 100% (11-20-22 @ 07:29) (100% - 100%)    Orthostatic VS  11-20-22 @ 07:29  Lying BP: --/-- HR: --  Sitting BP: 105/64 HR: 94  Standing BP: 107/70 HR: 98  Site: upper right arm  Mode: electronic  Orthostatic VS  11-19-22 @ 07:33  Lying BP: --/-- HR: --  Sitting BP: 102/69 HR: 63  Standing BP: 100/68 HR: 71  Site: upper right arm  Mode: electronic

## 2022-11-20 NOTE — BH INPATIENT PSYCHIATRY PROGRESS NOTE - NSTXDCOTHRINTERMD_PSY_ALL_CORE
pt willing to return to [facility]

## 2022-11-20 NOTE — BH INPATIENT PSYCHIATRY PROGRESS NOTE - NSTXMANICGOAL_PSY_ALL_CORE
Exhibit a substantial reduction in elated/angry acting out, and pressured speech that prevents mutual conversation

## 2022-11-20 NOTE — BH INPATIENT PSYCHIATRY PROGRESS NOTE - NSBHFUPINTERVALCCFT_PSY_A_CORE
"When do I go home"
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 
"when do I go home"
When do I go home."
"When am I going home"
"I really need to go home "
"I am of sound mind"

## 2022-11-20 NOTE — BH INPATIENT PSYCHIATRY PROGRESS NOTE - PRN MEDS
MEDICATIONS  (PRN):  benzocaine 15 mG/menthol 3.6 mG Lozenge 1 Lozenge Oral every 6 hours PRN Sore Throat  diphenhydrAMINE Injectable 50 milliGRAM(s) IntraMuscular every 6 hours PRN Menacing behavior  traZODone 50 milliGRAM(s) Oral at bedtime PRN insomnia  
MEDICATIONS  (PRN):  benzocaine 15 mG/menthol 3.6 mG Lozenge 1 Lozenge Oral every 6 hours PRN Sore Throat  traZODone 50 milliGRAM(s) Oral at bedtime PRN insomnia  
MEDICATIONS  (PRN):  benzocaine 15 mG/menthol 3.6 mG Lozenge 1 Lozenge Oral every 6 hours PRN Sore Throat  diphenhydrAMINE Injectable 50 milliGRAM(s) IntraMuscular every 6 hours PRN Menacing behavior  traZODone 50 milliGRAM(s) Oral at bedtime PRN insomnia  
MEDICATIONS  (PRN):  benzocaine 15 mG/menthol 3.6 mG Lozenge 1 Lozenge Oral every 6 hours PRN Sore Throat  diphenhydrAMINE Injectable 50 milliGRAM(s) IntraMuscular every 6 hours PRN Menacing behavior  traZODone 50 milliGRAM(s) Oral at bedtime PRN insomnia  
MEDICATIONS  (PRN):  benzocaine 15 mG/menthol 3.6 mG Lozenge 1 Lozenge Oral every 6 hours PRN Sore Throat  traZODone 50 milliGRAM(s) Oral at bedtime PRN insomnia

## 2022-11-20 NOTE — BH INPATIENT PSYCHIATRY PROGRESS NOTE - NSBHASSESSSUMMFT_PSY_ALL_CORE
Pt with no insight and is still with grandiose and paranoid delusions.  Pt at times accusatory.   Pt still maintaining that he has 23 degrees. 
Pt with grandiose delusions, lacking in insight, can be impulsive and labile in mood.  Pt  declining the need to be on medication . Pt verified that he initially claimed suicidal ideation but now claiming no suicidal ideation, intent or plan.  
pt with gradual decreased anxiety or irritability. Pt with denial of any side effects from medication.  Pt willing to return to [facility]. 
pt with long hx of mental disorder .  Pt currently with behavioral control and is willing to return to [facility].
Pt with improved goal directed speech.  Pt is calm, no distress and with improved frustration tolerance .  Pt denies any suicidal or homicidal ideation intent or plan 
Pt denies any suicidal ideation intent or plan Pt with increased goal directed speech

## 2022-11-20 NOTE — BH INPATIENT PSYCHIATRY PROGRESS NOTE - NSDCCRITERIA_PSY_ALL_CORE
pt able to maintain behavioral control

## 2022-11-20 NOTE — BH INPATIENT PSYCHIATRY PROGRESS NOTE - CONSULT REQUEST TIME
10-Nov-2022 20:46

## 2022-11-20 NOTE — BH INPATIENT PSYCHIATRY PROGRESS NOTE - NSBHMSEAFFRANGE_PSY_A_CORE
Blunted/Constricted
Labile/Constricted
Blunted/Constricted
Blunted/Constricted
Labile/Constricted

## 2022-11-20 NOTE — BH INPATIENT PSYCHIATRY PROGRESS NOTE - NSTXDCOTHRGOAL_PSY_ALL_CORE
Patient is homeless and did not previously have o/p treatment in place.
Patient is homeless and did not previously have o/p treatment in place.
Patient will be referred to the appropriate level of outpatient treatment and have appointments within 3-5 days of discharge.   will explore need for duel diagnosis tx with appointments if needed.  Benefits in place
Patient will be referred to the appropriate level of outpatient treatment and have appointments within 3-5 days of discharge.   will explore need for duel diagnosis tx with appointments if needed.  Benefits in place
Patient is homeless and did not previously have o/p treatment in place.
Patient will be referred to the appropriate level of outpatient treatment and have appointments within 3-5 days of discharge.   will explore need for duel diagnosis tx with appointments if needed.  Benefits in place
Patient is homeless and did not previously have o/p treatment in place.

## 2022-11-21 ENCOUNTER — TRANSCRIPTION ENCOUNTER (OUTPATIENT)
Age: 67
End: 2022-11-21

## 2022-11-21 VITALS — TEMPERATURE: 97 F | OXYGEN SATURATION: 99 % | RESPIRATION RATE: 18 BRPM

## 2022-11-21 LAB — SARS-COV-2 RNA SPEC QL NAA+PROBE: SIGNIFICANT CHANGE UP

## 2022-11-21 PROCEDURE — 99239 HOSP IP/OBS DSCHRG MGMT >30: CPT

## 2022-11-21 RX ORDER — RISPERIDONE 4 MG/1
1 TABLET ORAL
Qty: 30 | Refills: 1
Start: 2022-11-21 | End: 2022-12-20

## 2022-11-21 RX ORDER — ATORVASTATIN CALCIUM 80 MG/1
1 TABLET, FILM COATED ORAL
Qty: 15 | Refills: 1
Start: 2022-11-21 | End: 2022-12-20

## 2022-11-21 RX ORDER — NICOTINE POLACRILEX 2 MG
1 GUM BUCCAL
Qty: 15 | Refills: 1
Start: 2022-11-21 | End: 2022-12-20

## 2022-11-21 RX ORDER — ASPIRIN/CALCIUM CARB/MAGNESIUM 324 MG
1 TABLET ORAL
Qty: 15 | Refills: 1
Start: 2022-11-21 | End: 2022-12-20

## 2022-11-21 RX ORDER — TRAZODONE HCL 50 MG
1 TABLET ORAL
Qty: 15 | Refills: 1
Start: 2022-11-21 | End: 2022-12-20

## 2022-11-21 RX ORDER — METOPROLOL TARTRATE 50 MG
1 TABLET ORAL
Qty: 15 | Refills: 1
Start: 2022-11-21 | End: 2022-12-20

## 2022-11-21 RX ADMIN — Medication 12.5 MILLIGRAM(S): at 09:30

## 2022-11-21 RX ADMIN — RISPERIDONE 2 MILLIGRAM(S): 4 TABLET ORAL at 09:30

## 2022-11-21 RX ADMIN — Medication 81 MILLIGRAM(S): at 09:30

## 2022-11-21 NOTE — DISCHARGE NOTE NURSING/CASE MANAGEMENT/SOCIAL WORK - NSDCPEWEB_GEN_ALL_CORE
[facility] for Tobacco Control website --- [email or web address] website --- [email or web address]

## 2022-11-21 NOTE — DISCHARGE NOTE NURSING/CASE MANAGEMENT/SOCIAL WORK - NSDCPEEMAIL_GEN_ALL_CORE
[facility] for Tobacco Control email [email or web address]

## 2022-11-21 NOTE — DISCHARGE NOTE NURSING/CASE MANAGEMENT/SOCIAL WORK - PATIENT PORTAL LINK FT
You can access the FollowMyHealth Patient Portal offered by [facility] by registering at the following website: [email or web address]. By joining [employer]’s FollowMyHealth portal, you will also be able to view your health information using other applications (apps) compatible with our system.

## 2022-11-23 NOTE — CHART NOTE - NSCHARTNOTEFT_GEN_A_CORE
Spoke with Ines Avalos at [facility]. Patient missed his intake appointment yesterday. We have no number for patient and no # for his emergency housing placement. Sent letter to the [facility] placement and also spoke with Joe Sanchez, APS Worker for patient ([phone]) to advise where patient was placed and that he missed his intake appt. She will try to locate patient to assist him.

## 2022-11-25 DIAGNOSIS — F20.0 PARANOID SCHIZOPHRENIA: ICD-10-CM

## 2022-11-25 DIAGNOSIS — Z88.8 ALLERGY STATUS TO OTHER DRUGS, MEDICAMENTS AND BIOLOGICAL SUBSTANCES: ICD-10-CM

## 2022-11-25 DIAGNOSIS — Z88.0 ALLERGY STATUS TO PENICILLIN: ICD-10-CM

## 2022-11-25 DIAGNOSIS — Z91.14 PATIENT'S OTHER NONCOMPLIANCE WITH MEDICATION REGIMEN: ICD-10-CM

## 2022-11-25 DIAGNOSIS — L84 CORNS AND CALLOSITIES: ICD-10-CM

## 2022-11-25 DIAGNOSIS — E78.5 HYPERLIPIDEMIA, UNSPECIFIED: ICD-10-CM

## 2022-11-25 DIAGNOSIS — F11.10 OPIOID ABUSE, UNCOMPLICATED: ICD-10-CM

## 2022-11-25 DIAGNOSIS — Z91.018 ALLERGY TO OTHER FOODS: ICD-10-CM

## 2022-11-25 DIAGNOSIS — I10 ESSENTIAL (PRIMARY) HYPERTENSION: ICD-10-CM

## 2022-11-25 DIAGNOSIS — J06.9 ACUTE UPPER RESPIRATORY INFECTION, UNSPECIFIED: ICD-10-CM

## 2022-11-25 DIAGNOSIS — R45.851 SUICIDAL IDEATIONS: ICD-10-CM

## 2022-11-25 DIAGNOSIS — F17.210 NICOTINE DEPENDENCE, CIGARETTES, UNCOMPLICATED: ICD-10-CM

## 2022-12-09 NOTE — ED STATDOCS - CPE ED EYE NORM
bilateral normal...

## 2023-01-23 NOTE — PRE-OP CHECKLIST - NS PREOP CHK MONITOR ANESTHESIA CONSENT
Daughter Eligio Farrell who is the POA called and stated that pts brother Feliciano Chandra is to be visiting at some point. She does not want any medical information given to brother unless her father states it is okay and that her father is present when the information is shared. done

## 2023-03-29 NOTE — ED ADULT NURSE NOTE - NS ED NURSE LEVEL OF CONSCIOUSNESS AFFECT
Calm
Number Of Freeze-Thaw Cycles: 3 freeze-thaw cycles
Duration Of Freeze Thaw-Cycle (Seconds): 3
Post-Care Instructions: I reviewed with the patient in detail post-care instructions. Patient is to wear sunprotection, and avoid picking at any of the treated lesions. Pt may apply Vaseline to crusted or scabbing areas.
Consent: The patient's consent was obtained including but not limited to risks of crusting, scabbing, blistering, scarring, darker or lighter pigmentary change, recurrence, incomplete removal and infection.
Detail Level: Detailed
Render Post-Care Instructions In Note?: no
Show Applicator Variable?: Yes

## 2023-03-30 ENCOUNTER — EMERGENCY (EMERGENCY)
Facility: HOSPITAL | Age: 68
LOS: 0 days | Discharge: HOME | End: 2023-03-30
Attending: EMERGENCY MEDICINE
Payer: MEDICARE

## 2023-03-30 VITALS
RESPIRATION RATE: 19 BRPM | DIASTOLIC BLOOD PRESSURE: 77 MMHG | OXYGEN SATURATION: 100 % | HEART RATE: 85 BPM | SYSTOLIC BLOOD PRESSURE: 123 MMHG | TEMPERATURE: 98 F

## 2023-03-30 VITALS — HEIGHT: 67 IN | WEIGHT: 143.08 LBS

## 2023-03-30 DIAGNOSIS — F41.9 ANXIETY DISORDER, UNSPECIFIED: ICD-10-CM

## 2023-03-30 DIAGNOSIS — Z98.890 OTHER SPECIFIED POSTPROCEDURAL STATES: ICD-10-CM

## 2023-03-30 DIAGNOSIS — F20.0 PARANOID SCHIZOPHRENIA: ICD-10-CM

## 2023-03-30 DIAGNOSIS — R07.81 PLEURODYNIA: ICD-10-CM

## 2023-03-30 DIAGNOSIS — Z98.890 OTHER SPECIFIED POSTPROCEDURAL STATES: Chronic | ICD-10-CM

## 2023-03-30 DIAGNOSIS — Z91.018 ALLERGY TO OTHER FOODS: ICD-10-CM

## 2023-03-30 DIAGNOSIS — Z79.82 LONG TERM (CURRENT) USE OF ASPIRIN: ICD-10-CM

## 2023-03-30 DIAGNOSIS — Z88.0 ALLERGY STATUS TO PENICILLIN: ICD-10-CM

## 2023-03-30 DIAGNOSIS — Z88.8 ALLERGY STATUS TO OTHER DRUGS, MEDICAMENTS AND BIOLOGICAL SUBSTANCES: ICD-10-CM

## 2023-03-30 DIAGNOSIS — I71.4 ABDOMINAL AORTIC ANEURYSM, WITHOUT RUPTURE: Chronic | ICD-10-CM

## 2023-03-30 DIAGNOSIS — I10 ESSENTIAL (PRIMARY) HYPERTENSION: ICD-10-CM

## 2023-03-30 PROCEDURE — 99283 EMERGENCY DEPT VISIT LOW MDM: CPT

## 2023-03-30 PROCEDURE — 99284 EMERGENCY DEPT VISIT MOD MDM: CPT

## 2023-03-30 RX ORDER — IBUPROFEN 200 MG
600 TABLET ORAL ONCE
Refills: 0 | Status: COMPLETED | OUTPATIENT
Start: 2023-03-30 | End: 2023-03-30

## 2023-03-30 RX ORDER — CYCLOBENZAPRINE HYDROCHLORIDE 10 MG/1
10 TABLET, FILM COATED ORAL ONCE
Refills: 0 | Status: COMPLETED | OUTPATIENT
Start: 2023-03-30 | End: 2023-03-30

## 2023-03-30 RX ORDER — BENZTROPINE MESYLATE 1 MG
2 TABLET ORAL ONCE
Refills: 0 | Status: COMPLETED | OUTPATIENT
Start: 2023-03-30 | End: 2023-03-30

## 2023-03-30 RX ORDER — LIDOCAINE 4 G/100G
1 CREAM TOPICAL ONCE
Refills: 0 | Status: COMPLETED | OUTPATIENT
Start: 2023-03-30 | End: 2023-03-30

## 2023-03-30 RX ADMIN — Medication 2 MILLIGRAM(S): at 22:36

## 2023-03-30 RX ADMIN — CYCLOBENZAPRINE HYDROCHLORIDE 10 MILLIGRAM(S): 10 TABLET, FILM COATED ORAL at 22:35

## 2023-03-30 RX ADMIN — Medication 600 MILLIGRAM(S): at 22:35

## 2023-03-30 RX ADMIN — LIDOCAINE 1 PATCH: 4 CREAM TOPICAL at 22:38

## 2023-03-30 NOTE — ED ADULT NURSE NOTE - OBJECTIVE STATEMENT
Pt presents from shelter, states he was at [facility] and wants to go to sleep, but doesn't want to be at the shelter. Also c/o rib pain r/t old rib fx from three weeks ago. meds given and DCd

## 2023-03-30 NOTE — ED ADULT TRIAGE NOTE - CHIEF COMPLAINT QUOTE
patient states he is here because he needs medication and a place to live. states he was discharged home from [facility] earlier today, went to [facility] shelter, and was dropped off here. states he needs cogentin and has nowhere to live. denies staying at [facility]

## 2023-03-30 NOTE — ED STATDOCS - PATIENT PORTAL LINK FT
You can access the FollowMyHealth Patient Portal offered by [facility] by registering at the following website: [email or web address]. By joining [facility]’s FollowMyHealth portal, you will also be able to view your health information using other applications (apps) compatible with our system.

## 2023-03-30 NOTE — ED STATDOCS - OBJECTIVE STATEMENT
Pt. is a 68 yo M with schizophrenia, HTN, AAA presents requesting a bed to sleep tonight, a dose of Cogentin 2mg , and meds for pain for his rib fractures.  Patient states he was just discharged from [facility] and sent to [facility] but there was nowhere to lay down at [facility] so he came to the hospital.  Patient states he got 3 rib fractures 3 weeks ago.  Wants something to help with rib pain so he can lay down and relax.

## 2023-03-30 NOTE — ED ADULT NURSE NOTE - CHIEF COMPLAINT QUOTE
patient states he is here because he needs medication and a place to live. states he was discharged home from [facility] earlier today, went to [facility] shelter, and was dropped off here. states he needs cogentin and has nowhere to live. denies staying at [facility]

## 2023-03-30 NOTE — ED STATDOCS - CLINICAL SUMMARY MEDICAL DECISION MAKING FREE TEXT BOX
Meds given and patient to get info for local shelter or  in AM. Medically cleared, can be discharged.

## 2023-05-23 NOTE — ED STATDOCS - RESPIRATORY, MLM
whole-grain cereals and breads, oatmeal, beans, brown rice, citrus fruits, and apples.     Eat lean proteins. Heart-healthy proteins include seafood, lean meats and poultry, eggs, beans, peas, nuts, seeds, and soy products.     Limit drinks and foods with added sugar. These include candy, desserts, and soda pop. Lifestyle changes    If your doctor recommends it, get more exercise. Walking is a good choice. Bit by bit, increase the amount you walk every day. Try for at least 30 minutes on most days of the week. You also may want to swim, bike, or do other activities.     Do not smoke. If you need help quitting, talk to your doctor about stop-smoking programs and medicines. These can increase your chances of quitting for good. Quitting smoking may be the most important step you can take to protect your heart. It is never too late to quit.     Limit alcohol to 2 drinks a day for men and 1 drink a day for women. Too much alcohol can cause health problems.     Manage other health problems such as diabetes, high blood pressure, and high cholesterol. If you think you may have a problem with alcohol or drug use, talk to your doctor. Medicines    Take your medicines exactly as prescribed. Call your doctor if you think you are having a problem with your medicine.     If your doctor recommends aspirin, take the amount directed each day. Make sure you take aspirin and not another kind of pain reliever, such as acetaminophen (Tylenol). When should you call for help? Call 911 if you have symptoms of a heart attack. These may include:    Chest pain or pressure, or a strange feeling in the chest.     Sweating.     Shortness of breath.     Pain, pressure, or a strange feeling in the back, neck, jaw, or upper belly or in one or both shoulders or arms.     Lightheadedness or sudden weakness.     A fast or irregular heartbeat. After you call 911, the  may tell you to chew 1 adult-strength or 2 to 4 low-dose aspirin. breath sounds clear and equal bilaterally.

## 2023-06-04 NOTE — ED STATDOCS - ENMT, MLM
1 Principal Discharge DX:	Abdominal pain   Nasal mucosa clear.  Mouth with normal mucosa  Throat has no vesicles, no oropharyngeal exudates and uvula is midline.

## 2023-06-06 NOTE — PATIENT PROFILE BEHAVIORAL HEALTH - FUNCTIONAL SCREEN CURRENT LEVEL: SWALLOWING (IF SCORE 2 OR MORE FOR ANY ITEM, CONSULT REHAB SERVICES), MLM)
(0) swallows foods/liquids without difficulty Valtrex Counseling: I discussed with the patient the risks of valacyclovir including but not limited to kidney damage, nausea, vomiting and severe allergy.  The patient understands that if the infection seems to be worsening or is not improving, they are to call.

## 2023-08-15 PROBLEM — Z00.00 ENCOUNTER FOR PREVENTIVE HEALTH EXAMINATION: Status: ACTIVE | Noted: 2023-08-15

## 2023-08-16 ENCOUNTER — APPOINTMENT (OUTPATIENT)
Dept: FAMILY MEDICINE | Facility: CLINIC | Age: 68
End: 2023-08-16

## 2023-11-02 NOTE — PATIENT PROFILE BEHAVIORAL HEALTH - MEDICATION, ABILITY TO FOLLOW SCHEDULE, PROFILE
n/a
per report, pt has not been taking medications, stated "I don't need them."/lack of knowledge regarding medication purpose

## 2023-12-23 NOTE — H&P ADULT - NSHPPHYSICALEXAM_GEN_ALL_CORE
Vital Signs Last 24 Hrs  T(C): 36.5 (11 Nov 2022 07:11), Max: 36.9 (11 Nov 2022 00:30)  T(F): 97.7 (11 Nov 2022 07:11), Max: 98.5 (11 Nov 2022 00:30)  HR: 93 (11 Nov 2022 00:30) (93 - 93)  BP: 151/90 (11 Nov 2022 00:30) (151/90 - 151/90)  BP(mean): --  RR: 16 (11 Nov 2022 07:11) (16 - 18)  SpO2: 100% (11 Nov 2022 07:11) (98% - 100%)    Parameters below as of 11 Nov 2022 00:30  Patient On (Oxygen Delivery Method): room air - - -

## 2024-02-09 NOTE — ED ADULT NURSE NOTE - NSSISCREENINGQ1_ED_A_ED
Spiritual Plan of Care    Pt Name: Burton Moore  Pt : 1961  Date: 2024    Visit Type: In person  Referral Source: Patient  Reason for Visit: Trigger  Visited With: Patient not available  Length of Visit: 15 minutes  Spiritual Care Consult Needed: Yes (T)  Spiritual Care Visit Preference:     Taxonomy:    Intended Effects: Build relationship of care and support  Methods: Collaborate with care team member      Patient Affect at Time of Visit: Other (pt off unit for testing)  Patient Assessment: Unable to assess    Spiritual Plan of Care: Routine follow up    Pt off unit for testing at time of visit.   will reattempt.    Additional support available PRN.     No

## 2024-02-21 NOTE — PROGRESS NOTE BEHAVIORAL HEALTH - NSBHADMITIPBHPROVIDER_PSY_A_CORE
[Mother] : mother
N/A

## 2024-05-30 NOTE — BH TREATMENT PLAN - NSTXCAREGIVERPARTICIPATE_PSY_P_CORE
No, patient unwilling to involve family/caregiver
No, patient unwilling to involve family/caregiver
No

## 2024-07-20 NOTE — PROVIDER CONTACT NOTE (MEDICATION) - ASSESSMENT
Pt aggressive, verbally abusive and threatening. Pt unable to be reasoned with or re-directed making an unsafe environment for both patients and staff. The patient is a 44y Male complaining of hyperglycemia.

## 2024-11-17 NOTE — PROGRESS NOTE BEHAVIORAL HEALTH - NS ED BHA MED ROS SKIN
No complaints
I have personally performed a history and physical exam on this patient and personally directed the management of the patient. 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    On physical exam patient is normocephalic atraumatic pupils equal round reactive light accommodation extraocular muscles intact oropharynx clear chest clear to auscultation bilaterally abdomen is soft nontender nondistended bowel sounds positive no guarding rebound extremities full range of motion no focal deficits noted radial pulse 2+ pedal pulses 2+ no edema noted    Assessment plan patient presents for evaluation of left-sided lateral chest wall pain status post fall suddenly worsening over the past 24 to 36 hours we obtain EKG per my independent evaluation not consistent with STEMI NACSYS with QT prolongation on consistent with arrhythmia we obtain chest x-ray per my independent evaluation Octycine with pneumonia or pneumothorax given this patient's sudden worsening obtain CTA which is negative for PE however does reveal evidence of 2 rib fractures numbers 5 and 7 patient improved here we discussed indications to return this is most likely the explanation for the symptoms I will discharge follow-up to PMD next 24 to 48 hours
No complaints

## 2025-02-28 NOTE — ED PROVIDER NOTE - CPE EDP MUSC NORM
patient s/p cath Patient admitted for cath.  PMH of OA, prostate cancer, lumbar spinal stenosis, diverticulities Patient admitted for cath.  PMH of OA, prostate cancer, lumbar spinal stenosis, diverticulities normal... No

## 2025-03-08 NOTE — ED ADULT TRIAGE NOTE - HEIGHT IN FEET
Pt and family/friend educated regarding discharge instructions and demonstrated understanding. Pt ambulatory upon discharge and does not appear to be in any distress at this time. Pt's discharge paperwork and belongings sent home with pt and family/friend.   5

## 2025-03-18 NOTE — ED PROVIDER NOTE - MEDICAL DECISION MAKING DETAILS
61 y/o male with hx OHS, Paranoid Schizophrenia, homelessness and AAA, is now sent to [facility] from [facility] for gradually worsening abdominal pain.  CT scan at [facility] showed an enlarging AAA. Plan labs Type and screen, EKG, Vascular consult, and admission
no

## 2025-06-24 NOTE — PROGRESS NOTE ADULT - ASSESSMENT
Patient: Dale Aguilera    Procedure Summary       Date: 06/24/25 Room / Location: [facility]    Anesthesia Start: 1141 Anesthesia Stop:     Procedure: EGD Diagnosis: Eosinophilic esophagitis    Scheduled Providers: Crow Blakely MD; Sarai Bianchi MD Responsible Provider: Sarai Bianchi MD    Anesthesia Type: general ASA Status: 2            Anesthesia Type: general    Vitals Value Taken Time   /70 06/24/25 12:28   Temp 36.6 °C (97.9 °F) 06/24/25 12:28   Pulse 74 06/24/25 12:28   Resp 16 06/24/25 12:28   SpO2 97 % 06/24/25 12:28       Anesthesia Post Evaluation    Patient location during evaluation: PACU  Patient participation: complete - patient participated  Level of consciousness: awake and alert  Pain score: 0  Pain management: adequate  Airway patency: patent  Cardiovascular status: hemodynamically stable and acceptable  Respiratory status: acceptable, room air and spontaneous ventilation  Hydration status: acceptable  Postoperative Nausea and Vomiting: none        There were no known notable events for this encounter.     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    ·	Reg diet  ·	pain control  ·	psych meds  ·	neurovascular checks  ·	PT/OOB  ·	BP control  ·	continue ASA and lipitor  ·	DVT prophylaxis with SQH  ·	f/u Gonococcal/Chlamydia panel  ·	Pt is homeless, discharge planning. No needs, rehab or PT needed    #[id number]